# Patient Record
Sex: FEMALE | Race: WHITE | Employment: OTHER | URBAN - METROPOLITAN AREA
[De-identification: names, ages, dates, MRNs, and addresses within clinical notes are randomized per-mention and may not be internally consistent; named-entity substitution may affect disease eponyms.]

---

## 2017-12-26 ENCOUNTER — OFFICE VISIT (OUTPATIENT)
Dept: INTERNAL MEDICINE CLINIC | Age: 82
End: 2017-12-26

## 2017-12-26 VITALS
DIASTOLIC BLOOD PRESSURE: 73 MMHG | OXYGEN SATURATION: 97 % | SYSTOLIC BLOOD PRESSURE: 149 MMHG | BODY MASS INDEX: 24.63 KG/M2 | RESPIRATION RATE: 16 BRPM | HEIGHT: 63 IN | TEMPERATURE: 97.4 F | WEIGHT: 139 LBS | HEART RATE: 64 BPM

## 2017-12-26 DIAGNOSIS — M25.561 RIGHT KNEE PAIN, UNSPECIFIED CHRONICITY: ICD-10-CM

## 2017-12-26 DIAGNOSIS — I10 ESSENTIAL HYPERTENSION: ICD-10-CM

## 2017-12-26 DIAGNOSIS — F33.9 RECURRENT DEPRESSION (HCC): ICD-10-CM

## 2017-12-26 DIAGNOSIS — E55.9 VITAMIN D DEFICIENCY: ICD-10-CM

## 2017-12-26 DIAGNOSIS — E78.5 HYPERLIPIDEMIA, UNSPECIFIED HYPERLIPIDEMIA TYPE: ICD-10-CM

## 2017-12-26 DIAGNOSIS — E53.8 B12 DEFICIENCY: ICD-10-CM

## 2017-12-26 DIAGNOSIS — Z76.89 ENCOUNTER TO ESTABLISH CARE WITH NEW DOCTOR: Primary | ICD-10-CM

## 2017-12-26 DIAGNOSIS — G31.84 MCI (MILD COGNITIVE IMPAIRMENT) WITH MEMORY LOSS: ICD-10-CM

## 2017-12-26 RX ORDER — DICLOFENAC SODIUM 75 MG/1
TABLET, DELAYED RELEASE ORAL
COMMUNITY
End: 2017-12-26 | Stop reason: SDUPTHER

## 2017-12-26 RX ORDER — DICLOFENAC SODIUM 75 MG/1
75 TABLET, DELAYED RELEASE ORAL 2 TIMES DAILY
Qty: 180 TAB | Refills: 1 | Status: SHIPPED | OUTPATIENT
Start: 2017-12-26 | End: 2019-01-04 | Stop reason: SDUPTHER

## 2017-12-26 RX ORDER — BISMUTH SUBSALICYLATE 262 MG
1 TABLET,CHEWABLE ORAL DAILY
COMMUNITY

## 2017-12-26 RX ORDER — SIMVASTATIN 40 MG/1
TABLET, FILM COATED ORAL
COMMUNITY
End: 2017-12-26 | Stop reason: SDUPTHER

## 2017-12-26 RX ORDER — DULOXETIN HYDROCHLORIDE 60 MG/1
60 CAPSULE, DELAYED RELEASE ORAL DAILY
COMMUNITY
End: 2017-12-26 | Stop reason: SDUPTHER

## 2017-12-26 RX ORDER — DULOXETIN HYDROCHLORIDE 60 MG/1
60 CAPSULE, DELAYED RELEASE ORAL DAILY
Qty: 90 CAP | Refills: 1 | Status: SHIPPED | OUTPATIENT
Start: 2017-12-26 | End: 2019-01-04 | Stop reason: SDUPTHER

## 2017-12-26 RX ORDER — SIMVASTATIN 40 MG/1
40 TABLET, FILM COATED ORAL
Qty: 90 TAB | Refills: 1 | Status: SHIPPED | OUTPATIENT
Start: 2017-12-26 | End: 2019-01-04 | Stop reason: SDUPTHER

## 2017-12-26 RX ORDER — GLUCOSAMINE SULFATE 1500 MG
POWDER IN PACKET (EA) ORAL DAILY
COMMUNITY
End: 2018-01-31 | Stop reason: ALTCHOICE

## 2017-12-26 NOTE — PROGRESS NOTES
1. Have you been to the ER, urgent care clinic since your last visit? Hospitalized since your last visit? No    2. Have you seen or consulted any other health care providers outside of the [address] [place] [address] since your last visit? Include any pap smears or colon screening. [facility] Dr. Keith Hopkins.  for Neurology. Pt just moved here from [address] [place] [address] in July.

## 2017-12-26 NOTE — PROGRESS NOTES
Muriel Jaramillo is a [de-identified] y.o. female who presents today for Establish Care; Memory Loss; and Knee Pain (right)      She has a history of   Patient Active Problem List   Diagnosis Code    [facility] F33.9    Essential hypertension I10    Hyperlipidemia E78.5   . Today patient is here to establish care. Previous PCP Dr. Jaxon Lutz. she is switching because establishing care. Records are not available for me to review. Moved here from [place] to be closer to family. she does not have other concerns. Knee Pain: R knee. Has been going on for some time. Pain similar to L knee pain before replacement. No swelling. Feels a bit weak. Memory Loss: notes that this has been going on for some time. Over the last year has been getting a bit worse. Did neuro psych testing about 2 yrs ago and noted to have MCI. Mainly with short term memory. Did have some getting lost in a known location. Pt drinking EtOH regularly, 1-2/night. Pt does not stay very mentally active. A lot of passive activities. HTN: A bit up today. Not on any meds currently. Previously on Ramipril. HLD: On statin. Anxiety/Depression: On SNRI. Notes since passing of . No SI/HI. Social: retired. Previously worked part time. Has 3 children.  for 2 yrs. From [place] originally. ROS  Review of Systems   Constitutional: Negative for chills, fever, malaise/fatigue and weight loss. HENT: Negative for hearing loss and tinnitus. Respiratory: Negative for cough and shortness of breath. Cardiovascular: Negative for chest pain, palpitations and leg swelling. Gastrointestinal: Negative for abdominal pain, blood in stool, constipation, diarrhea, heartburn, nausea and vomiting. Genitourinary: Negative for dysuria, frequency, hematuria and urgency. Musculoskeletal: Positive for joint pain. Negative for back pain, myalgias and neck pain. Skin: Negative for itching and rash. Neurological: Negative. Endo/Heme/Allergies: Does not bruise/bleed easily. Psychiatric/Behavioral: Positive for memory loss. Negative for depression, hallucinations, substance abuse and suicidal ideas. The patient is not nervous/anxious and does not have insomnia. Visit Vitals    /73 (BP 1 Location: Left arm, BP Patient Position: Sitting)    Pulse 64    Temp 97.4 °F (36.3 °C) (Oral)    Resp 16    Ht 5' 3\" (1.6 m)    Wt 139 lb (63 kg)    SpO2 97%    BMI 24.62 kg/m2       Physical Exam   Constitutional: She is oriented to person, place, and time and well-developed, well-nourished, and in no distress. No distress. HENT:   Head: Normocephalic and atraumatic. Mouth/Throat: No oropharyngeal exudate. Eyes: Conjunctivae are normal. Pupils are equal, round, and reactive to light. No scleral icterus. Neck: Normal range of motion. No JVD present. No thyromegaly present. Cardiovascular: Normal rate and regular rhythm. Exam reveals no gallop and no friction rub. No murmur heard. Pulmonary/Chest: Effort normal and breath sounds normal. No respiratory distress. She has no wheezes. Abdominal: Soft. Bowel sounds are normal. She exhibits no distension. There is no rebound and no guarding. Musculoskeletal: Normal range of motion. She exhibits no edema. Legs:  Pain where noted. No crepitus. FROM. Neurological: She is alert and oriented to person, place, and time. No cranial nerve deficit. Some issues with short term memory, but answers appropriately. Skin: Skin is dry. No rash noted. Psychiatric: Mood, memory, affect and judgment normal.       Current Outpatient Prescriptions   Medication Sig    cholecalciferol (VITAMIN D3) 1,000 unit cap Take  by mouth daily.  multivitamin (ONE A DAY) tablet Take 1 Tab by mouth daily.  simvastatin (ZOCOR) 40 mg tablet Take 1 Tab by mouth nightly.  DULoxetine (CYMBALTA) 60 mg capsule Take 1 Cap by mouth daily.     diclofenac EC (VOLTAREN) 75 mg EC tablet Take 1 Tab by mouth two (2) times a day. No current facility-administered medications for this visit. Past Medical History:   Diagnosis Date    Anxiety     Depression     Hypercholesterolemia     Hypertension       Past Surgical History:   Procedure Laterality Date    HX APPENDECTOMY  1945    HX HYSTERECTOMY  1978    HX KNEE REPLACEMENT Left 2015    HX TONSILLECTOMY  1944      Social History   Substance Use Topics    Smoking status: Never Smoker    Smokeless tobacco: Never Used    Alcohol use 4.2 - 8.4 oz/week     7 - 14 Glasses of wine per week      Family History   Problem Relation Age of Onset    Adopted: Yes        No Known Allergies     Assessment/Plan  Diagnoses and all orders for this visit:    1. Encounter to establish care with new doctor - Will get old records. 2. Right knee pain, unspecified chronicity - Progressive. Worse at night. Taking scheduled AM Voltaren for ? Reason. Suggest moving to only PRN. May need Ortho evaluation in the future. -     diclofenac EC (VOLTAREN) 75 mg EC tablet; Take 1 Tab by mouth two (2) times a day. 3. Recurrent depression ([address] - Stable at this time. Not likely a contributor to memory issues. -     DULoxetine (CYMBALTA) 60 mg capsule; Take 1 Cap by mouth daily. 4. Vitamin D deficiency - repeat. -     VITAMIN D, 25 HYDROXY    5. Hyperlipidemia, unspecified hyperlipidemia type   -     LIPID PANEL  -     simvastatin (ZOCOR) 40 mg tablet; Take 1 Tab by mouth nightly. 6. Essential hypertension - off all meds. Previously on ACEi. Check at home and bring in log.   -     METABOLIC PANEL, COMPREHENSIVE  -     CBC W/O DIFF    7. MCI (mild cognitive impairment) with memory loss - notes to be worsening. Had NS testing done two years ago. No focal neurological symptoms. Suggest avoiding EtOH and being more mentally active. May need repeat testing.   -     TSH 3RD GENERATION  -     VITAMIN B12    8.  B12 deficiency - in the past.   -     VITAMIN B12        Follow-up Disposition:  Return in about 6 weeks (around 2/6/2018) for BP check .     Remedios Benson MD  12/26/2017

## 2017-12-26 NOTE — MR AVS SNAPSHOT
Visit Information Date & Time Provider Department Dept. Phone Encounter #  
 12/26/2017  3:00 PM Marley Mcgovern MD [facility] [address] [id number] Upcoming Health Maintenance Date Due DTaP/Tdap/Td series (1 - Tdap) 12/8/1958 ZOSTER VACCINE AGE 60> 10/8/1997 GLAUCOMA SCREENING Q2Y 12/8/2002 Pneumococcal 65+ Low/Medium Risk (1 of 2 - PCV13) 12/8/2002 MEDICARE YEARLY EXAM 12/8/2002 Allergies as of 12/26/2017  Review Complete On: 79/37/7419 By: Patvaleria Chantelle Wears No Known Allergies Current Immunizations  Never Reviewed No immunizations on file. Not reviewed this visit You Were Diagnosed With   
  
 Codes Comments Encounter to establish care with new doctor    -  Primary ICD-10-CM: Z76.89 
ICD-9-CM: V65.8 Right knee pain, unspecified chronicity     ICD-10-CM: M25.561 ICD-9-CM: 719.46 Recurrent depression ([facility]     ICD-10-CM: F33.9 ICD-9-CM: 296.30 Vitamin D deficiency     ICD-10-CM: E55.9 ICD-9-CM: 268.9 Hyperlipidemia, unspecified hyperlipidemia type     ICD-10-CM: E78.5 ICD-9-CM: 272.4 Essential hypertension     ICD-10-CM: I10 
ICD-9-CM: 401.9 MCI (mild cognitive impairment) with memory loss     ICD-10-CM: G31.84 ICD-9-CM: 331.83, 780.93   
 B12 deficiency     ICD-10-CM: E53.8 ICD-9-CM: 266.2 Vitals BP Pulse Temp Resp Height(growth percentile) Weight(growth percentile) 149/73 (BP 1 Location: Left arm, BP Patient Position: Sitting) 64 97.4 °F (36.3 °C) (Oral) 16 5' 3\" (1.6 m) 139 lb (63 kg) SpO2 BMI OB Status Smoking Status 97% 24.62 kg/m2 Hysterectomy Never Smoker Vitals History BMI and BSA Data Body Mass Index Body Surface Area  
 24.62 kg/m 2 1.67 m 2 Preferred Pharmacy Pharmacy Name Phone [address] [facility] [phone] Your Updated Medication List  
  
   
 This list is accurate as of: 12/26/17  4:27 PM.  Always use your most recent med list.  
  
  
  
  
 diclofenac EC 75 mg EC tablet Commonly known as:  VOLTAREN Take 1 Tab by mouth two (2) times a day. DULoxetine 60 mg capsule Commonly known as:  CYMBALTA Take 1 Cap by mouth daily. multivitamin tablet Commonly known as:  ONE A DAY Take 1 Tab by mouth daily. simvastatin 40 mg tablet Commonly known as:  ZOCOR Take 1 Tab by mouth nightly. VITAMIN D3 1,000 unit Cap Generic drug:  cholecalciferol Take  by mouth daily. Prescriptions Sent to Pharmacy Refills  
 simvastatin (ZOCOR) 40 mg tablet 1 Sig: Take 1 Tab by mouth nightly. Class: Normal  
 Pharmacy: [address], [address] Ph #: [phone] Route: Oral  
 DULoxetine (CYMBALTA) 60 mg capsule 1 Sig: Take 1 Cap by mouth daily. Class: Normal  
 Pharmacy: [address], [address] Ph #: [phone] Route: Oral  
 diclofenac EC (VOLTAREN) 75 mg EC tablet 1 Sig: Take 1 Tab by mouth two (2) times a day. Class: Normal  
 Pharmacy: [address], [address] Ph #: [phone] Route: Oral  
  
We Performed the Following CBC W/O DIFF [89773 CPT(R)] LIPID PANEL [38290 CPT(R)] METABOLIC PANEL, COMPREHENSIVE [72427 CPT(R)] TSH 3RD GENERATION [74971 CPT(R)] VITAMIN B12 [id number] CPT(R)] VITAMIN D, 25 HYDROXY [id number] CPT(R)] Patient Instructions Take diclofenac twice daily as needed , Check BP two times daily. Introducing [facility]! Tatum Sauceda introduces [employer] patient portal. Now you can access parts of your medical record, email your doctor's office, and request medication refills online. 1. In your internet browser, go to [email or web address] 2. Click on the First Time User? Click Here link in the Sign In box. You will see the New Member Sign Up page. 3. Enter your [employer] Access Code exactly as it appears below. You will not need to use this code after youve completed the sign-up process. If you do not sign up before the expiration date, you must request a new code. · [employer] Access Code: [email or web address] Expires: 3/26/2018  4:27 PM 
 
4. Enter the last four digits of your Social Security Number (xxxx) and Date of Birth (mm/dd/yyyy) as indicated and click Submit. You will be taken to the next sign-up page. 5. Create a [employer]idia ID. This will be your [employer] login ID and cannot be changed, so think of one that is secure and easy to remember. 6. Create a [employer] password. You can change your password at any time. 7. Enter your Password Reset Question and Answer. This can be used at a later time if you forget your password. 8. Enter your e-mail address. You will receive e-mail notification when new information is available in [address] 9. Click Sign Up. You can now view and download portions of your medical record. 10. Click the Download Summary menu link to download a portable copy of your medical information. If you have questions, please visit the Frequently Asked Questions section of the [employer] website. Remember, [employer] is NOT to be used for urgent needs. For medical emergencies, dial 911. Now available from your iPhone and Android! Please provide this summary of care documentation to your next provider. Your primary care clinician is listed as General Edward. If you have any questions after today's visit, please call [phone].

## 2018-01-16 ENCOUNTER — DOCUMENTATION ONLY (OUTPATIENT)
Dept: INTERNAL MEDICINE CLINIC | Age: 83
End: 2018-01-16

## 2018-01-16 NOTE — PROGRESS NOTES
Received medical records from Dr Beronica Arriola, [facility]. Records have been placed on Dr. Orozco Reason desk for review.

## 2018-01-29 ENCOUNTER — HOSPITAL ENCOUNTER (OUTPATIENT)
Dept: LAB | Age: 83
Discharge: HOME | End: 2018-01-29
Payer: MEDICARE

## 2018-01-29 PROCEDURE — 36415 COLL VENOUS BLD VENIPUNCTURE: CPT

## 2018-01-29 PROCEDURE — 80053 COMPREHEN METABOLIC PANEL: CPT

## 2018-01-29 PROCEDURE — 85027 COMPLETE CBC AUTOMATED: CPT

## 2018-01-29 PROCEDURE — 80061 LIPID PANEL: CPT

## 2018-01-29 PROCEDURE — 82306 VITAMIN D 25 HYDROXY: CPT

## 2018-01-29 PROCEDURE — 84443 ASSAY THYROID STIM HORMONE: CPT

## 2018-01-29 PROCEDURE — 82607 VITAMIN B-12: CPT

## 2018-01-30 LAB
25(OH)D3+25(OH)D2 SERPL-MCNC: 75 NG/ML (ref 30–100)
ALBUMIN SERPL-MCNC: 4.7 G/DL (ref 3.5–4.7)
ALBUMIN/GLOB SERPL: 1.7 {RATIO} (ref 1.2–2.2)
ALP SERPL-CCNC: 78 IU/L (ref 39–117)
ALT SERPL-CCNC: 14 IU/L (ref 0–32)
AST SERPL-CCNC: 14 IU/L (ref 0–40)
BILIRUB SERPL-MCNC: 0.4 MG/DL (ref 0–1.2)
BUN SERPL-MCNC: 18 MG/DL (ref 8–27)
BUN/CREAT SERPL: 28 (ref 12–28)
CALCIUM SERPL-MCNC: 9.6 MG/DL (ref 8.7–10.3)
CHLORIDE SERPL-SCNC: 101 MMOL/L (ref 96–106)
CHOLEST SERPL-MCNC: 164 MG/DL (ref 100–199)
CO2 SERPL-SCNC: 25 MMOL/L (ref 18–29)
CREAT SERPL-MCNC: 0.65 MG/DL (ref 0.57–1)
ERYTHROCYTE [DISTWIDTH] IN BLOOD BY AUTOMATED COUNT: 13.4 % (ref 12.3–15.4)
GFR SERPLBLD CREATININE-BSD FMLA CKD-EPI: 84 ML/MIN/1.73
GFR SERPLBLD CREATININE-BSD FMLA CKD-EPI: 97 ML/MIN/1.73
GLOBULIN SER CALC-MCNC: 2.7 G/DL (ref 1.5–4.5)
GLUCOSE SERPL-MCNC: 104 MG/DL (ref 65–99)
HCT VFR BLD AUTO: 38.8 % (ref 34–46.6)
HDLC SERPL-MCNC: 63 MG/DL
HGB BLD-MCNC: 12.5 G/DL (ref 11.1–15.9)
INTERPRETATION, 910389: NORMAL
LDLC SERPL CALC-MCNC: 85 MG/DL (ref 0–99)
MCH RBC QN AUTO: 29.8 PG (ref 26.6–33)
MCHC RBC AUTO-ENTMCNC: 32.2 G/DL (ref 31.5–35.7)
MCV RBC AUTO: 92 FL (ref 79–97)
PLATELET # BLD AUTO: 293 X10E3/UL (ref 150–379)
POTASSIUM SERPL-SCNC: 4 MMOL/L (ref 3.5–5.2)
PROT SERPL-MCNC: 7.4 G/DL (ref 6–8.5)
RBC # BLD AUTO: 4.2 X10E6/UL (ref 3.77–5.28)
SODIUM SERPL-SCNC: 145 MMOL/L (ref 134–144)
TRIGL SERPL-MCNC: 80 MG/DL (ref 0–149)
TSH SERPL DL<=0.005 MIU/L-ACNC: 2.76 UIU/ML (ref 0.45–4.5)
VIT B12 SERPL-MCNC: 360 PG/ML (ref 232–1245)
VLDLC SERPL CALC-MCNC: 16 MG/DL (ref 5–40)
WBC # BLD AUTO: 4.6 X10E3/UL (ref 3.4–10.8)

## 2018-01-31 ENCOUNTER — OFFICE VISIT (OUTPATIENT)
Dept: INTERNAL MEDICINE CLINIC | Age: 83
End: 2018-01-31

## 2018-01-31 VITALS
DIASTOLIC BLOOD PRESSURE: 71 MMHG | BODY MASS INDEX: 24.1 KG/M2 | WEIGHT: 136 LBS | OXYGEN SATURATION: 98 % | HEART RATE: 66 BPM | SYSTOLIC BLOOD PRESSURE: 194 MMHG | RESPIRATION RATE: 16 BRPM | HEIGHT: 63 IN

## 2018-01-31 DIAGNOSIS — F33.9 RECURRENT DEPRESSION (HCC): ICD-10-CM

## 2018-01-31 DIAGNOSIS — Z12.39 SCREENING FOR MALIGNANT NEOPLASM OF BREAST: ICD-10-CM

## 2018-01-31 DIAGNOSIS — E78.5 HYPERLIPIDEMIA, UNSPECIFIED HYPERLIPIDEMIA TYPE: ICD-10-CM

## 2018-01-31 DIAGNOSIS — G31.84 MCI (MILD COGNITIVE IMPAIRMENT): ICD-10-CM

## 2018-01-31 DIAGNOSIS — Z00.00 INITIAL MEDICARE ANNUAL WELLNESS VISIT: Primary | ICD-10-CM

## 2018-01-31 DIAGNOSIS — I10 ESSENTIAL HYPERTENSION: ICD-10-CM

## 2018-01-31 DIAGNOSIS — Z78.0 POSTMENOPAUSAL: ICD-10-CM

## 2018-01-31 RX ORDER — LISINOPRIL 20 MG/1
20 TABLET ORAL DAILY
Qty: 30 TAB | Refills: 1 | Status: SHIPPED | OUTPATIENT
Start: 2018-01-31 | End: 2018-01-31 | Stop reason: SDUPTHER

## 2018-01-31 RX ORDER — LISINOPRIL 20 MG/1
20 TABLET ORAL DAILY
Qty: 30 TAB | Refills: 1 | Status: SHIPPED | OUTPATIENT
Start: 2018-01-31 | End: 2018-02-21 | Stop reason: SDUPTHER

## 2018-01-31 NOTE — PROGRESS NOTES
Marcos Huff is a [de-identified] y.o. female who presents today for Hypertension; Cholesterol Problem; and Depression  . She has a history of   Patient Active Problem List   Diagnosis Code    Recurrent depression ([facility] F33.9    Essential hypertension I10    Hyperlipidemia E78.5   . Today patient is here for f/u.   she does not have other concerns. Hypertension - off all meds. Today very high. Previously on ACEi    reports that she has never smoked. She has never used smokeless tobacco.    reports that she drinks about 4.2 - 8.4 oz of alcohol per week    BP Readings from Last 2 Encounters:   01/31/18 194/71   12/26/17 149/73     Hyperlipidemia  Currently she takes 40 mg of simvastatin. ROS: taking medications as instructed, no medication side effects noted  No new myalgias, no joint pains, no weakness  No TIA's, no chest pain on exertion, no dyspnea on exertion, no swelling of ankles. Lab Results   Component Value Date/Time    Cholesterol, total 164 01/29/2018 10:35 AM    HDL Cholesterol 63 01/29/2018 10:35 AM    LDL, calculated 85 01/29/2018 10:35 AM    VLDL, calculated 16 01/29/2018 10:35 AM    Triglyceride 80 01/29/2018 10:35 AM     Knee Pain: Taking BID Diclofenac. Stable. Memory Loss: notes that this has been going on for some time. Over the last year has been getting a bit worse. Did neuro psych testing about 2 yrs ago and noted to have MCI. Mainly with short term memory. Did have some getting lost in a known location. Pt drinking EtOH regularly, 1-2/night. Pt does not stay very mentally active. A lot of passive activities, but has been socializing more. Mood and depression is better. Has been on namenda in the past.     B12 Deficiency: in the past. Now     MCI: was on namenda in the past. ? For s/e. Health maintenance hx includes:  Exercise: not active. Diet: generally follows a low fat low cholesterol diet, nonsmoker  Social: . Staying mentally active. At [facility]. Screening:    Colon cancer screening:  Last Colonoscopy:Notes within last 10 yrs. Breast cancer screening: last mammogram Mammogram ordered. Cervical cancer screening: last PAP/Pelvic exam:N/A. Osteoporosis screening:  Last BMD:  2008 and revealed    - Osteopenia      Immunizations:     Immunization History   Administered Date(s) Administered    Pneumococcal Conjugate (PCV-13) 08/14/2015    Pneumococcal Polysaccharide (PPSV-23) 11/17/2000, 02/25/2013    Tdap 10/27/1997, 06/01/2007    Zoster Vaccine, Live 06/01/2007      Immunization status: up to date and documented. ROS  Review of Systems   Constitutional: Negative for chills, fever and weight loss. Respiratory: Negative for cough and shortness of breath. Cardiovascular: Negative for chest pain, palpitations and leg swelling. Gastrointestinal: Negative for abdominal pain, nausea and vomiting. Musculoskeletal: Positive for joint pain. Neurological: Negative. Endo/Heme/Allergies: Does not bruise/bleed easily. Psychiatric/Behavioral: Negative for depression. The patient is not nervous/anxious. Visit Vitals    /71 (BP 1 Location: Left arm, BP Patient Position: Sitting)    Pulse 66    Resp 16    Ht 5' 3\" (1.6 m)    Wt 136 lb (61.7 kg)    SpO2 98%    BMI 24.09 kg/m2       Physical Exam   Constitutional: She is oriented to person, place, and time. She appears well-developed and well-nourished. HENT:   Head: Normocephalic and atraumatic. Cardiovascular: Normal rate and regular rhythm. No murmur heard. Pulmonary/Chest: Effort normal. No respiratory distress. Neurological: She is alert and oriented to person, place, and time. Skin: Skin is warm and dry. Psychiatric: She has a normal mood and affect. Her behavior is normal.         Current Outpatient Prescriptions   Medication Sig    lisinopril (PRINIVIL, ZESTRIL) 20 mg tablet Take 1 Tab by mouth daily.     cyanocobalamin 1,000 mcg tablet Take 1,000 mcg by mouth daily.  multivitamin (ONE A DAY) tablet Take 1 Tab by mouth daily.  simvastatin (ZOCOR) 40 mg tablet Take 1 Tab by mouth nightly.  DULoxetine (CYMBALTA) 60 mg capsule Take 1 Cap by mouth daily.  diclofenac EC (VOLTAREN) 75 mg EC tablet Take 1 Tab by mouth two (2) times a day. No current facility-administered medications for this visit. Past Medical History:   Diagnosis Date    Anxiety     Depression     Hypercholesterolemia     Hypertension       Past Surgical History:   Procedure Laterality Date    HX APPENDECTOMY  1945    HX HYSTERECTOMY  1978    HX KNEE REPLACEMENT Left 2015    HX TONSILLECTOMY  1944      Social History   Substance Use Topics    Smoking status: Never Smoker    Smokeless tobacco: Never Used    Alcohol use 4.2 - 8.4 oz/week     7 - 14 Glasses of wine per week      Family History   Problem Relation Age of Onset    Adopted: Yes        No Known Allergies     Assessment/Plan  Diagnoses and all orders for this visit:    1. Initial Medicare annual wellness visit - MMG and Dexa. Notes C-scope UTD. Will f/u. Living will and end of life planning has been done  Urged to increase physical activity. Margert Klinefelter was counseled on age-appropriate/ guideline-based risk prevention behaviors and screening for a [de-identified]y.o. year old   female . We also discussed adjustments in screening based on family history if necessary. Printed instructions for preventative screening guidelines and healthy behaviors given to patient with after visit summary. 2. Postmenopausal  -     DEXA BONE DENSITY STUDY AXIAL; Future    3. Recurrent depression (HCC) - stable     4. Essential hypertension - High today. On ACEi in the past. Resume. See me in 2-4 weeks for BP check. -     lisinopril (PRINIVIL, ZESTRIL) 20 mg tablet; Take 1 Tab by mouth daily. 5. Hyperlipidemia, unspecified hyperlipidemia type    6.  Screening for malignant neoplasm of breast  -     NIKITA MAMMO BI SCREENING INCL CAD; Future    7. MCI (mild cognitive impairment) - was [facility] in the past. Memory stable. Consider adding something in the future. Follow-up Disposition:  Return in about 2 weeks (around 2/14/2018) for BP check. Gabriel Emanuel MD  1/31/2018            This is an Initial Medicare Annual Wellness Exam (AWV) (Performed 12 months after IPPE or effective date of Medicare Part B enrollment, Once in a lifetime)    I have reviewed the patient's medical history in detail and updated the computerized patient record. History     Past Medical History:   Diagnosis Date    Anxiety     Depression     Hypercholesterolemia     Hypertension       Past Surgical History:   Procedure Laterality Date    HX APPENDECTOMY  1945    HX HYSTERECTOMY  1978    HX KNEE REPLACEMENT Left 2015    HX TONSILLECTOMY  1944     Current Outpatient Prescriptions   Medication Sig Dispense Refill    lisinopril (PRINIVIL, ZESTRIL) 20 mg tablet Take 1 Tab by mouth daily. 30 Tab 1    cyanocobalamin 1,000 mcg tablet Take 1,000 mcg by mouth daily.  multivitamin (ONE A DAY) tablet Take 1 Tab by mouth daily.  simvastatin (ZOCOR) 40 mg tablet Take 1 Tab by mouth nightly. 90 Tab 1    DULoxetine (CYMBALTA) 60 mg capsule Take 1 Cap by mouth daily. 90 Cap 1    diclofenac EC (VOLTAREN) 75 mg EC tablet Take 1 Tab by mouth two (2) times a day.  180 Tab 1     No Known Allergies  Family History   Problem Relation Age of Onset    Adopted: Yes     Social History   Substance Use Topics    Smoking status: Never Smoker    Smokeless tobacco: Never Used    Alcohol use 4.2 - 8.4 oz/week     7 - 14 Glasses of wine per week     Patient Active Problem List   Diagnosis Code    Recurrent depression ([facility] F33.9    Essential hypertension I10    Hyperlipidemia E78.5       Depression Risk Factor Screening:     PHQ over the last two weeks 12/26/2017   PHQ Not Done Active Diagnosis of Depression or Bipolar Disorder     Alcohol Risk Factor Screening: You do not drink alcohol or very rarely. Functional Ability and Level of Safety:     Hearing Loss  Hearing is good. Activities of Daily Living  The home contains: no safety equipment. Patient does total self care    Fall Risk  Fall Risk Assessment, last 12 mths 12/26/2017   Able to walk? Yes   Fall in past 12 months? No       Abuse Screen  Patient is not abused    Cognitive Screening   Evaluation of Cognitive Function:  Has your family/caregiver stated any concerns about your memory: no  Abnormal    Patient Care Team   Patient Care Team:  Debra Weiner MD as PCP - General (Internal Medicine)    Assessment/Plan   Education and counseling provided:  Are appropriate based on today's review and evaluation  End-of-Life planning (with patient's consent)  Pneumococcal Vaccine  Influenza Vaccine  Screening Mammography    Diagnoses and all orders for this visit:    1.  Initial Medicare annual wellness visit       Health Maintenance Due   Topic Date Due    GLAUCOMA SCREENING Q2Y  12/08/2002    MEDICARE YEARLY EXAM  12/08/2002    DTaP/Tdap/Td series (3 - Td) 06/01/2017

## 2018-01-31 NOTE — LETTER
1/31/2018 12:12 PM 
 
Ms. Eda Shahid [address] [facility] [address] [id number] Dear Eda Shahid: Please find your most recent results below. Resulted Orders METABOLIC PANEL, COMPREHENSIVE Result Value Ref Range Glucose 104 (H) 65 - 99 mg/dL BUN 18 8 - 27 mg/dL Creatinine 0.65 0.57 - 1.00 mg/dL GFR est non-AA 84 >59 mL/min/1.73 GFR est AA 97 >59 mL/min/1.73  
 BUN/Creatinine ratio 28 12 - 28 Sodium 145 (H) 134 - 144 mmol/L Potassium 4.0 3.5 - 5.2 mmol/L Chloride 101 96 - 106 mmol/L  
 CO2 25 18 - 29 mmol/L Calcium 9.6 8.7 - 10.3 mg/dL Protein, total 7.4 6.0 - 8.5 g/dL Albumin 4.7 3.5 - 4.7 g/dL GLOBULIN, TOTAL 2.7 1.5 - 4.5 g/dL A-G Ratio 1.7 1.2 - 2.2 Bilirubin, total 0.4 0.0 - 1.2 mg/dL Alk. phosphatase 78 39 - 117 IU/L  
 AST (SGOT) 14 0 - 40 IU/L  
 ALT (SGPT) 14 0 - 32 IU/L Narrative Performed at:  [address]  [id number] : Vero Madsen MD, Phone:  [phone] CBC W/O DIFF Result Value Ref Range WBC 4.6 3.4 - 10.8 x10E3/uL  
 RBC 4.20 3.77 - 5.28 x10E6/uL HGB 12.5 11.1 - 15.9 g/dL HCT 38.8 34.0 - 46.6 % MCV 92 79 - 97 fL  
 MCH 29.8 26.6 - 33.0 pg  
 MCHC 32.2 31.5 - 35.7 g/dL  
 RDW 13.4 12.3 - 15.4 % PLATELET 597 878 - 362 x10E3/uL Narrative Performed at:  [address]  [id number] : Vero Madsen MD, Phone:  [phone] TSH 3RD GENERATION Result Value Ref Range TSH 2.760 0.450 - 4.500 uIU/mL Narrative Performed at:  [address]  [id number] : Vero Madsen MD, Phone:  [phone] VITAMIN B12 Result Value Ref Range Vitamin B12 360 232 - 1245 pg/mL Narrative Performed at:  [address]  [id number] : Vero Madsen MD, Phone:  [phone] LIPID PANEL Result Value Ref Range Cholesterol, total 164 100 - 199 mg/dL Triglyceride 80 0 - 149 mg/dL HDL Cholesterol 63 >39 mg/dL VLDL, calculated 16 5 - 40 mg/dL LDL, calculated 85 0 - 99 mg/dL Narrative Performed at:  [address]  [id number] : Shiloh Osman MD, Phone:  [phone] VITAMIN D, 25 HYDROXY Result Value Ref Range VITAMIN D, 25-HYDROXY 75.0 30.0 - 100.0 ng/mL Comment:  
   Vitamin D deficiency has been defined by the [address] [place] [address] practice guideline as a 
level of serum 25-OH vitamin D less than 20 ng/mL (1,2). The Endocrine Society went on to further define vitamin D 
insufficiency as a level between 21 and 29 ng/mL (2). 1. IOM ([place] of Medicine). 2010. Dietary reference 
   intakes for calcium and D. [address] [place] [address]: The 
   [employer]. 2. Shari MF, Deejay NC, Eden RICO, et al. 
   Evaluation, treatment, and prevention of vitamin D 
   deficiency: an Endocrine Society clinical practice 
   guideline. JCEM. 2011 Jul; 96(7):1911-30. Narrative Performed at:  [address]  [id number] : Shiloh Osman MD, Phone:  [phone] CVD REPORT Result Value Ref Range INTERPRETATION Note Comment:  
   Supplemental report is available. Narrative Performed at:  [address] [place] [address]  [id number] : Tyler Jones PhD, Phone:  [phone] Please call me if you have any questions: [phone] Sincerely, Gabriel Emanuel MD

## 2018-01-31 NOTE — PATIENT INSTRUCTIONS

## 2018-01-31 NOTE — MR AVS SNAPSHOT
[id number] [address] 
[phone] Patient: Isaiah Bosch MRN: [id number] :1935 Visit Information Date & Time Provider Department Dept. Phone Encounter #  
 2018 11:45 AM Jana Acevedo MD [facility] [phone] [id number] Upcoming Health Maintenance Date Due  
 GLAUCOMA SCREENING Q2Y 2002 MEDICARE YEARLY EXAM 2002 DTaP/Tdap/Td series (3 - Td) 2017 Allergies as of 2018  Review Complete On: 2018 By: Jana Acevedo MD  
 No Known Allergies Current Immunizations  Reviewed on 2017 Name Date Pneumococcal Conjugate (PCV-13) 2015 Pneumococcal Polysaccharide (PPSV-23) 2013, 2000 Tdap 2007, 10/27/1997 Zoster Vaccine, Live 2007 Not reviewed this visit You Were Diagnosed With   
  
 Codes Comments Initial Medicare annual wellness visit    -  Primary ICD-10-CM: Z00.00 ICD-9-CM: V70.0 Postmenopausal     ICD-10-CM: Z78.0 ICD-9-CM: V49.81 Recurrent depression ([address]     ICD-10-CM: F33.9 ICD-9-CM: 296.30 Essential hypertension     ICD-10-CM: I10 
ICD-9-CM: 401.9 Hyperlipidemia, unspecified hyperlipidemia type     ICD-10-CM: E78.5 ICD-9-CM: 272.4 Screening for malignant neoplasm of breast     ICD-10-CM: Z12.31 
ICD-9-CM: V76.10 MCI (mild cognitive impairment)     ICD-10-CM: G31.84 ICD-9-CM: 331.83 Vitals BP Pulse Resp Height(growth percentile) Weight(growth percentile) SpO2  
 194/71 (BP 1 Location: Left arm, BP Patient Position: Sitting) 66 16 5' 3\" (1.6 m) 136 lb (61.7 kg) 98% BMI OB Status Smoking Status 24.09 kg/m2 Hysterectomy Never Smoker Vitals History BMI and BSA Data Body Mass Index Body Surface Area 24.09 kg/m 2 1.66 m 2 Preferred Pharmacy Pharmacy Name Phone [facility]/PHARMACY #[id number] [facility], [address] [place] [address]  [phone] Your Updated Medication List  
  
   
This list is accurate as of: 1/31/18 12:32 PM.  Always use your most recent med list.  
  
  
  
  
 cyanocobalamin 1,000 mcg tablet Take 1,000 mcg by mouth daily. diclofenac EC 75 mg EC tablet Commonly known as:  VOLTAREN Take 1 Tab by mouth two (2) times a day. DULoxetine 60 mg capsule Commonly known as:  CYMBALTA Take 1 Cap by mouth daily. lisinopril 20 mg tablet Commonly known as:  Anoop Dubonnet Take 1 Tab by mouth daily. multivitamin tablet Commonly known as:  ONE A DAY Take 1 Tab by mouth daily. simvastatin 40 mg tablet Commonly known as:  ZOCOR Take 1 Tab by mouth nightly. Prescriptions Sent to Pharmacy Refills  
 lisinopril (PRINIVIL, ZESTRIL) 20 mg tablet 1 Sig: Take 1 Tab by mouth daily. Class: Normal  
 Pharmacy: [facility]/pharmacy #[id number] [facility], [address] [place] [address]  Ph #: [phone] Route: Oral  
  
To-Do List   
 01/31/2018 Imaging:  DEXA BONE DENSITY STUDY AXIAL   
  
 01/31/2018 Imaging:  NIKITA MAMMO BI SCREENING INCL CAD Patient Instructions Medicare Wellness Visit, Female The best way to live healthy is to have a healthy lifestyle by eating a well-balanced diet, exercising regularly, limiting alcohol and stopping smoking. Regular physical exams and screening tests are another way to keep healthy. Preventive exams provided by your health care provider can find health problems before they become diseases or illnesses. Preventive services including immunizations, screening tests, monitoring and exams can help you take care of your own health. All people over age 72 should have a pneumovax  and and a prevnar shot to prevent pneumonia. These are once in a lifetime unless you and your provider decide differently. All people over 65 should have a yearly flu shot and a tetanus vaccine every 10 years. A bone mass density to screen for osteoporosis or thinning of the bones should be done every 2 years after 65. Screening for diabetes mellitus with a blood sugar test should be done every year. Glaucoma is a disease of the eye due to increased ocular pressure that can lead to blindness and it should be done every year by an eye professional. 
 
Cardiovascular screening tests that check for elevated lipids (fatty part of blood) which can lead to heart disease and strokes should be done every 5 years. Colorectal screening that evaluates for blood or polyps in your colon should be done yearly as a stool test or every five years as a flexible sigmoidoscope or every 10 years as a colonoscopy up to age 76. Breast cancer screening with a mammogram is recommended biennially  for women age 54-69. Screening for cervical cancer with a pap smear and pelvic exam is recommended for women after age 72 years every 2 years up to age 79 or when the provider and patient decide to stop. If there is a history of cervical abnormalities or other increased risk for cancer then the test is recommended yearly. Hepatitis C screening is also recommended for anyone born between [birth year] through [address]. A shingles vaccine is also recommended once in a lifetime after age 61. Your Medicare Wellness Exam is recommended annually. Here is a list of your current Health Maintenance items with a due date: 
Health Maintenance Due Topic Date Due  Glaucoma Screening   12/08/2002 [facility] Annual Well Visit  12/08/2002  DTaP/Tdap/Td  (3 - Td) 06/01/2017 Introducing [facility]! Brodie Paredes introduces [employer] patient portal. Now you can access parts of your medical record, email your doctor's office, and request medication refills online. 1. In your internet browser, go to [email or web address] 2. Click on the First Time User? Click Here link in the Sign In box. You will see the New Member Sign Up page. 3. Enter your [employer] Access Code exactly as it appears below. You will not need to use this code after youve completed the sign-up process. If you do not sign up before the expiration date, you must request a new code. · [employer] Access Code: [email or web address] Expires: 3/26/2018  4:27 PM 
 
4. Enter the last four digits of your Social Security Number (xxxx) and Date of Birth (mm/dd/yyyy) as indicated and click Submit. You will be taken to the next sign-up page. 5. Create a [employer] ID. This will be your [employer] login ID and cannot be changed, so think of one that is secure and easy to remember. 6. Create a [employer] password. You can change your password at any time. 7. Enter your Password Reset Question and Answer. This can be used at a later time if you forget your password. 8. Enter your e-mail address. You will receive e-mail notification when new information is available in [address] 9. Click Sign Up. You can now view and download portions of your medical record. 10. Click the Download Summary menu link to download a portable copy of your medical information. If you have questions, please visit the Frequently Asked Questions section of the [employer] website. Remember, [employer] is NOT to be used for urgent needs. For medical emergencies, dial 911. Now available from your iPhone and Android! Please provide this summary of care documentation to your next provider. Your primary care clinician is listed as Wyatt Garibay. If you have any questions after today's visit, please call [phone].

## 2018-02-21 ENCOUNTER — OFFICE VISIT (OUTPATIENT)
Dept: INTERNAL MEDICINE CLINIC | Age: 83
End: 2018-02-21

## 2018-02-21 VITALS
OXYGEN SATURATION: 98 % | DIASTOLIC BLOOD PRESSURE: 75 MMHG | WEIGHT: 138 LBS | HEART RATE: 78 BPM | SYSTOLIC BLOOD PRESSURE: 135 MMHG | HEIGHT: 63 IN | BODY MASS INDEX: 24.45 KG/M2 | TEMPERATURE: 97.5 F

## 2018-02-21 DIAGNOSIS — G31.84 MCI (MILD COGNITIVE IMPAIRMENT) WITH MEMORY LOSS: ICD-10-CM

## 2018-02-21 DIAGNOSIS — I10 ESSENTIAL HYPERTENSION: Primary | ICD-10-CM

## 2018-02-21 RX ORDER — DONEPEZIL HYDROCHLORIDE 5 MG/1
5 TABLET, FILM COATED ORAL
Qty: 3 TAB | Refills: 4 | Status: SHIPPED | OUTPATIENT
Start: 2018-02-21 | End: 2018-02-22 | Stop reason: SDUPTHER

## 2018-02-21 RX ORDER — LISINOPRIL 20 MG/1
20 TABLET ORAL DAILY
Qty: 90 TAB | Refills: 1 | Status: SHIPPED | OUTPATIENT
Start: 2018-02-21 | End: 2018-09-19 | Stop reason: SDUPTHER

## 2018-02-21 NOTE — PATIENT INSTRUCTIONS
High Blood Pressure: Care Instructions  Your Care Instructions    If your blood pressure is usually above 140/90, you have high blood pressure, or hypertension. That means the top number is 140 or higher or the bottom number is 90 or higher, or both. Despite what a lot of people think, high blood pressure usually doesn't cause headaches or make you feel dizzy or lightheaded. It usually has no symptoms. But it does increase your risk for heart attack, stroke, and kidney or eye damage. The higher your blood pressure, the more your risk increases. Your doctor will give you a goal for your blood pressure. Your goal will be based on your health and your age. An example of a goal is to keep your blood pressure below 140/90. Lifestyle changes, such as eating healthy and being active, are always important to help lower blood pressure. You might also take medicine to reach your blood pressure goal.  Follow-up care is a key part of your treatment and safety. Be sure to make and go to all appointments, and call your doctor if you are having problems. It's also a good idea to know your test results and keep a list of the medicines you take. How can you care for yourself at home? Medical treatment  · If you stop taking your medicine, your blood pressure will go back up. You may take one or more types of medicine to lower your blood pressure. Be safe with medicines. Take your medicine exactly as prescribed. Call your doctor if you think you are having a problem with your medicine. · Talk to your doctor before you start taking aspirin every day. Aspirin can help certain people lower their risk of a heart attack or stroke. But taking aspirin isn't right for everyone, because it can cause serious bleeding. · See your doctor regularly. You may need to see the doctor more often at first or until your blood pressure comes down.   · If you are taking blood pressure medicine, talk to your doctor before you take decongestants or anti-inflammatory medicine, such as ibuprofen. Some of these medicines can raise blood pressure. · Learn how to check your blood pressure at home. Lifestyle changes  · Stay at a healthy weight. This is especially important if you put on weight around the waist. Losing even 10 pounds can help you lower your blood pressure. · If your doctor recommends it, get more exercise. Walking is a good choice. Bit by bit, increase the amount you walk every day. Try for at least 30 minutes on most days of the week. You also may want to swim, bike, or do other activities. · Avoid or limit alcohol. Talk to your doctor about whether you can drink any alcohol. · Try to limit how much sodium you eat to less than 2,300 milligrams (mg) a day. Your doctor may ask you to try to eat less than 1,500 mg a day. · Eat plenty of fruits (such as bananas and oranges), vegetables, legumes, whole grains, and low-fat dairy products. · Lower the amount of saturated fat in your diet. Saturated fat is found in animal products such as milk, cheese, and meat. Limiting these foods may help you lose weight and also lower your risk for heart disease. · Do not smoke. Smoking increases your risk for heart attack and stroke. If you need help quitting, talk to your doctor about stop-smoking programs and medicines. These can increase your chances of quitting for good. When should you call for help? Call 911 anytime you think you may need emergency care. This may mean having symptoms that suggest that your blood pressure is causing a serious heart or blood vessel problem. Your blood pressure may be over 180/110. ? For example, call 911 if:  ? · You have symptoms of a heart attack. These may include:  ¨ Chest pain or pressure, or a strange feeling in the chest.  ¨ Sweating. ¨ Shortness of breath. ¨ Nausea or vomiting.   ¨ Pain, pressure, or a strange feeling in the back, neck, jaw, or upper belly or in one or both shoulders or arms.  ¨ Lightheadedness or sudden weakness. ¨ A fast or irregular heartbeat. ? · You have symptoms of a stroke. These may include:  ¨ Sudden numbness, tingling, weakness, or loss of movement in your face, arm, or leg, especially on only one side of your body. ¨ Sudden vision changes. ¨ Sudden trouble speaking. ¨ Sudden confusion or trouble understanding simple statements. ¨ Sudden problems with walking or balance. ¨ A sudden, severe headache that is different from past headaches. ? · You have severe back or belly pain. ?Do not wait until your blood pressure comes down on its own. Get help right away. ?Call your doctor now or seek immediate care if:  ? · Your blood pressure is much higher than normal (such as 180/110 or higher), but you don't have symptoms. ? · You think high blood pressure is causing symptoms, such as:  ¨ Severe headache. ¨ Blurry vision. ? Watch closely for changes in your health, and be sure to contact your doctor if:  ? · Your blood pressure measures 140/90 or higher at least 2 times. That means the top number is 140 or higher or the bottom number is 90 or higher, or both. ? · You think you may be having side effects from your blood pressure medicine. ? · Your blood pressure is usually normal, but it goes above normal at least 2 times. Where can you learn more? Go to http://dar-albania.info/. Enter T713 in the search box to learn more about \"High Blood Pressure: Care Instructions. \"  Current as of: September 21, 2016  Content Version: 11.4  © 4497-5256 [email or web address]. Care instructions adapted under license by [employer] (which disclaims liability or warranty for this information). If you have questions about a medical condition or this instruction, always ask your healthcare professional. [place] [address] any warranty or liability for your use of this information.

## 2018-02-21 NOTE — PROGRESS NOTES
Paulino Robles is a [de-identified] y.o. female who presents today for Blood Pressure Check (3 week f/u )  . She has a history of   Patient Active Problem List   Diagnosis Code    Recurrent depression ([facility] F33.9    Essential hypertension I10    Hyperlipidemia E78.5   . Today patient is here for f/u on BP.   she does not have other concerns. Hypertension - resumed due to very high BP. Since notes doing well. No dizziness. Hypertension ROS: taking medications as instructed, no medication side effects noted, no TIA's, no chest pain on exertion, no dyspnea on exertion, no swelling of ankles     reports that she has never smoked. She has never used smokeless tobacco.    reports that she drinks about 4.2 - 8.4 oz of alcohol per week    BP Readings from Last 2 Encounters:   02/21/18 135/75   01/31/18 194/71     MCI: per neuropsychology. ROS  Review of Systems   Constitutional: Negative for chills, fever and weight loss. Respiratory: Negative for cough and shortness of breath. Cardiovascular: Negative for chest pain, palpitations and leg swelling. Gastrointestinal: Negative for abdominal pain, nausea and vomiting. Neurological: Negative. Endo/Heme/Allergies: Does not bruise/bleed easily. Psychiatric/Behavioral: Positive for memory loss. Negative for depression. The patient is not nervous/anxious. Visit Vitals    /75    Pulse 78    Temp 97.5 °F (36.4 °C) (Oral)    Ht 5' 3\" (1.6 m)    Wt 138 lb (62.6 kg)    SpO2 98%    BMI 24.45 kg/m2       Physical Exam   Constitutional: She is oriented to person, place, and time. She appears well-developed and well-nourished. No distress. HENT:   Head: Normocephalic and atraumatic. Neck: Normal range of motion. Neck supple. No thyromegaly present. Cardiovascular: Normal rate and regular rhythm. No murmur heard. Pulmonary/Chest: Effort normal and breath sounds normal. No respiratory distress. She has no wheezes. Abdominal: Soft.  Bowel sounds are normal. She exhibits no distension. Musculoskeletal: She exhibits no edema. Neurological: She is alert and oriented to person, place, and time. No cranial nerve deficit. Skin: Skin is warm and dry. Psychiatric: She has a normal mood and affect. Her behavior is normal.         Current Outpatient Prescriptions   Medication Sig    Biotin 2,500 mcg cap Take  by mouth.  lisinopril (PRINIVIL, ZESTRIL) 20 mg tablet Take 1 Tab by mouth daily.  donepezil (ARICEPT) 5 mg tablet Take 1 Tab by mouth nightly.  cyanocobalamin 1,000 mcg tablet Take 1,000 mcg by mouth daily.  multivitamin (ONE A DAY) tablet Take 1 Tab by mouth daily.  simvastatin (ZOCOR) 40 mg tablet Take 1 Tab by mouth nightly.  DULoxetine (CYMBALTA) 60 mg capsule Take 1 Cap by mouth daily.  diclofenac EC (VOLTAREN) 75 mg EC tablet Take 1 Tab by mouth two (2) times a day. No current facility-administered medications for this visit. Past Medical History:   Diagnosis Date    Anxiety     Depression     Hypercholesterolemia     Hypertension       Past Surgical History:   Procedure Laterality Date    HX APPENDECTOMY  1945    HX HYSTERECTOMY  1978    HX KNEE REPLACEMENT Left 2015    HX TONSILLECTOMY  1944      Social History   Substance Use Topics    Smoking status: Never Smoker    Smokeless tobacco: Never Used    Alcohol use 4.2 - 8.4 oz/week     7 - 14 Glasses of wine per week      Family History   Problem Relation Age of Onset    Adopted: Yes        No Known Allergies     Assessment/Plan  Diagnoses and all orders for this visit:    1. Essential hypertension - much improved. No S/e. Continue at this dose. -     lisinopril (PRINIVIL, ZESTRIL) 20 mg tablet; Take 1 Tab by mouth daily. 2. MCI (mild cognitive impairment) with memory loss - Has had NS testing. Try low dose aricept. S/e discussed. -     donepezil (ARICEPT) 5 mg tablet; Take 1 Tab by mouth nightly.         Follow-up Disposition: Not on File    Jil Spatz, MD  2/21/2018

## 2018-02-21 NOTE — MR AVS SNAPSHOT
[address] [facility] 
 
 
 [address] [place] [address] 
[phone] Patient: Latoya Rojas MRN: [id number] :1935 Visit Information Date & Time Provider Department Dept. Phone Encounter #  
 2018 11:15 AM Meche Shanks MD [facility] [address] [id number] Upcoming Health Maintenance Date Due  
 GLAUCOMA SCREENING Q2Y 2002 DTaP/Tdap/Td series (3 - Td) 2017 MEDICARE YEARLY EXAM 2019 Allergies as of 2018  Review Complete On: 2018 By: Noemi Alexander LPN No Known Allergies Current Immunizations  Reviewed on 2017 Name Date Pneumococcal Conjugate (PCV-13) 2015 Pneumococcal Polysaccharide (PPSV-23) 2013, 2000 Tdap 2007, 10/27/1997 Zoster Vaccine, Live 2007 Not reviewed this visit You Were Diagnosed With   
  
 Codes Comments Essential hypertension    -  Primary ICD-10-CM: I10 
ICD-9-CM: 401.9 MCI (mild cognitive impairment) with memory loss     ICD-10-CM: G31.84 ICD-9-CM: 331.83, 780.93 Vitals BP Pulse Temp Height(growth percentile) Weight(growth percentile) SpO2  
 135/75 78 97.5 °F (36.4 °C) (Oral) 5' 3\" (1.6 m) 138 lb (62.6 kg) 98% BMI OB Status Smoking Status 24.45 kg/m2 Hysterectomy Never Smoker Vitals History BMI and BSA Data Body Mass Index Body Surface Area  
 24.45 kg/m 2 1.67 m 2 Preferred Pharmacy Pharmacy Name Phone [id number] [address] [facility], [address] [place] [address]  [phone] Your Updated Medication List  
  
   
This list is accurate as of 18 11:48 AM.  Always use your most recent med list.  
  
  
  
  
 Biotin 2,500 mcg Cap Take  by mouth.  
  
 cyanocobalamin 1,000 mcg tablet Take 1,000 mcg by mouth daily. diclofenac EC 75 mg EC tablet Commonly known as:  VOLTAREN  
 Take 1 Tab by mouth two (2) times a day. donepezil 5 mg tablet Commonly known as:  ARICEPT Take 1 Tab by mouth nightly. DULoxetine 60 mg capsule Commonly known as:  CYMBALTA Take 1 Cap by mouth daily. lisinopril 20 mg tablet Commonly known as:  [place] Take 1 Tab by mouth daily. multivitamin tablet Commonly known as:  ONE A DAY Take 1 Tab by mouth daily. simvastatin 40 mg tablet Commonly known as:  ZOCOR Take 1 Tab by mouth nightly. Prescriptions Sent to Pharmacy Refills  
 lisinopril (PRINIVIL, ZESTRIL) 20 mg tablet 1 Sig: Take 1 Tab by mouth daily. Class: Normal  
 Pharmacy: [address], [address] [facility] Ph #: [phone] Route: Oral  
 donepezil (ARICEPT) 5 mg tablet 4 Sig: Take 1 Tab by mouth nightly. Class: Normal  
 Pharmacy: [facility][id number]- [address] Ph #: [phone] Route: Oral  
  
To-Do List   
 02/21/2018 3:00 PM  
(Arrive by 2:45 PM) Appointment with [facility] DEXA/NIKITA 1 at [facility] ([phone]) Please, no calcium supplements or antacids that coat the stomach (ex: Tums, Mylanta) 24 hours prior to procedure. Maintain normal diet and medications. Dairy products are allowed. Wear an outfit with an elastic waistband (no zipper or metal snaps). Check in at registration 15min before your appointment time unless you were instructed to do otherwise. Please arrive 15 minutes prior to appointment to register 03/02/2018 3:30 PM  
(Arrive by 3:15 PM) Appointment with [facility] NIKITA 1 at [facility] ([phone]) Shower or bathe using soap and water. Do not use deodorant, powder, perfumes, or lotion the day of your exam.  If your prior mammograms were not performed at [facility] 6 please bring films with you or forward prior images 2 days before your procedure.   Check in at registration 15min before your appointment time unless you were instructed to do otherwise. A script is not necessary, but if you have one, please bring it on the day of the mammogram or have it faxed to the department. [facility] [phone] [facility] C[facility]  [phone] [facility] [place] [address] [place]  [phone] [facility] [phone] [address] [phone] Please arrive 15 minutes prior to appointment to register Patient Instructions High Blood Pressure: Care Instructions Your Care Instructions If your blood pressure is usually above 140/90, you have high blood pressure, or hypertension. That means the top number is 140 or higher or the bottom number is 90 or higher, or both. Despite what a lot of people think, high blood pressure usually doesn't cause headaches or make you feel dizzy or lightheaded. It usually has no symptoms. But it does increase your risk for heart attack, stroke, and kidney or eye damage. The higher your blood pressure, the more your risk increases. Your doctor will give you a goal for your blood pressure. Your goal will be based on your health and your age. An example of a goal is to keep your blood pressure below 140/90. Lifestyle changes, such as eating healthy and being active, are always important to help lower blood pressure. You might also take medicine to reach your blood pressure goal. 
Follow-up care is a key part of your treatment and safety. Be sure to make and go to all appointments, and call your doctor if you are having problems. It's also a good idea to know your test results and keep a list of the medicines you take. How can you care for yourself at home? Medical treatment · If you stop taking your medicine, your blood pressure will go back up. You may take one or more types of medicine to lower your blood pressure. Be safe with medicines. Take your medicine exactly as prescribed. Call your doctor if you think you are having a problem with your medicine. · Talk to your doctor before you start taking aspirin every day. Aspirin can help certain people lower their risk of a heart attack or stroke. But taking aspirin isn't right for everyone, because it can cause serious bleeding. · See your doctor regularly. You may need to see the doctor more often at first or until your blood pressure comes down. · If you are taking blood pressure medicine, talk to your doctor before you take decongestants or anti-inflammatory medicine, such as ibuprofen. Some of these medicines can raise blood pressure. · Learn how to check your blood pressure at home. Lifestyle changes · Stay at a healthy weight. This is especially important if you put on weight around the waist. Losing even 10 pounds can help you lower your blood pressure. · If your doctor recommends it, get more exercise. Walking is a good choice. Bit by bit, increase the amount you walk every day. Try for at least 30 minutes on most days of the week. You also may want to swim, bike, or do other activities. · Avoid or limit alcohol. Talk to your doctor about whether you can drink any alcohol. · Try to limit how much sodium you eat to less than 2,300 milligrams (mg) a day. Your doctor may ask you to try to eat less than 1,500 mg a day. · Eat plenty of fruits (such as bananas and oranges), vegetables, legumes, whole grains, and low-fat dairy products. · Lower the amount of saturated fat in your diet. Saturated fat is found in animal products such as milk, cheese, and meat. Limiting these foods may help you lose weight and also lower your risk for heart disease. · Do not smoke. Smoking increases your risk for heart attack and stroke. If you need help quitting, talk to your doctor about stop-smoking programs and medicines. These can increase your chances of quitting for good. When should you call for help? Call 911 anytime you think you may need emergency care.  This may mean having symptoms that suggest that your blood pressure is causing a serious heart or blood vessel problem. Your blood pressure may be over 180/110. ? For example, call 911 if: 
? · You have symptoms of a heart attack. These may include: ¨ Chest pain or pressure, or a strange feeling in the chest. 
¨ Sweating. ¨ Shortness of breath. ¨ Nausea or vomiting. ¨ Pain, pressure, or a strange feeling in the back, neck, jaw, or upper belly or in one or both shoulders or arms. ¨ Lightheadedness or sudden weakness. ¨ A fast or irregular heartbeat. ? · You have symptoms of a stroke. These may include: 
¨ Sudden numbness, tingling, weakness, or loss of movement in your face, arm, or leg, especially on only one side of your body. ¨ Sudden vision changes. ¨ Sudden trouble speaking. ¨ Sudden confusion or trouble understanding simple statements. ¨ Sudden problems with walking or balance. ¨ A sudden, severe headache that is different from past headaches. ? · You have severe back or belly pain. ?Do not wait until your blood pressure comes down on its own. Get help right away. ?Call your doctor now or seek immediate care if: 
? · Your blood pressure is much higher than normal (such as 180/110 or higher), but you don't have symptoms. ? · You think high blood pressure is causing symptoms, such as: ¨ Severe headache. ¨ Blurry vision. ? Watch closely for changes in your health, and be sure to contact your doctor if: 
? · Your blood pressure measures 140/90 or higher at least 2 times. That means the top number is 140 or higher or the bottom number is 90 or higher, or both. ? · You think you may be having side effects from your blood pressure medicine. ? · Your blood pressure is usually normal, but it goes above normal at least 2 times. Where can you learn more? Go to http://dar-albania.info/. Enter Z570 in the search box to learn more about \"High Blood Pressure: Care Instructions. \" 
 Current as of: September 21, 2016 Content Version: 11.4 © 9651-9614 Healthwise, [employer]. Care instructions adapted under license by [employer] (which disclaims liability or warranty for this information). If you have questions about a medical condition or this instruction, always ask your healthcare professional. [address] any warranty or liability for your use of this information. Introducing [facility] & HEALTH SERVICES! [facility] introduces [employer] patient portal. Now you can access parts of your medical record, email your doctor's office, and request medication refills online. 1. In your internet browser, go to [email or web address] 2. Click on the First Time User? Click Here link in the Sign In box. You will see the New Member Sign Up page. 3. Enter your [employer] Access Code exactly as it appears below. You will not need to use this code after youve completed the sign-up process. If you do not sign up before the expiration date, you must request a new code. · [employer] Access Code: [email or web address] Expires: 3/26/2018  4:27 PM 
 
4. Enter the last four digits of your Social Security Number (xxxx) and Date of Birth (mm/dd/yyyy) as indicated and click Submit. You will be taken to the next sign-up page. 5. Create a [employer] ID. This will be your [employer] login ID and cannot be changed, so think of one that is secure and easy to remember. 6. Create a [employer] password. You can change your password at any time. 7. Enter your Password Reset Question and Answer. This can be used at a later time if you forget your password. 8. Enter your e-mail address. You will receive e-mail notification when new information is available in [address] 9. Click Sign Up. You can now view and download portions of your medical record. 10. Click the Download Summary menu link to download a portable copy of your medical information. If you have questions, please visit the Frequently Asked Questions section of the [employer] website. Remember, [employer] is NOT to be used for urgent needs. For medical emergencies, dial 911. Now available from your iPhone and Android! Please provide this summary of care documentation to your next provider. Your primary care clinician is listed as Jerry Wang. If you have any questions after today's visit, please call [phone].

## 2018-02-22 DIAGNOSIS — G31.84 MCI (MILD COGNITIVE IMPAIRMENT) WITH MEMORY LOSS: ICD-10-CM

## 2018-02-22 RX ORDER — DONEPEZIL HYDROCHLORIDE 5 MG/1
5 TABLET, FILM COATED ORAL
Qty: 30 TAB | Refills: 4 | Status: SHIPPED | OUTPATIENT
Start: 2018-02-22 | End: 2018-05-30 | Stop reason: SDUPTHER

## 2018-05-30 ENCOUNTER — HOSPITAL ENCOUNTER (OUTPATIENT)
Dept: LAB | Age: 83
Discharge: HOME | End: 2018-05-30
Payer: MEDICARE

## 2018-05-30 ENCOUNTER — OFFICE VISIT (OUTPATIENT)
Dept: INTERNAL MEDICINE CLINIC | Age: 83
End: 2018-05-30

## 2018-05-30 VITALS
BODY MASS INDEX: 24.63 KG/M2 | HEART RATE: 86 BPM | TEMPERATURE: 97.6 F | SYSTOLIC BLOOD PRESSURE: 128 MMHG | DIASTOLIC BLOOD PRESSURE: 64 MMHG | WEIGHT: 139 LBS | HEIGHT: 63 IN | OXYGEN SATURATION: 98 % | RESPIRATION RATE: 16 BRPM

## 2018-05-30 DIAGNOSIS — F33.9 RECURRENT DEPRESSION (HCC): ICD-10-CM

## 2018-05-30 DIAGNOSIS — J30.9 ALLERGIC RHINITIS, UNSPECIFIED SEASONALITY, UNSPECIFIED TRIGGER: ICD-10-CM

## 2018-05-30 DIAGNOSIS — I10 ESSENTIAL HYPERTENSION: ICD-10-CM

## 2018-05-30 DIAGNOSIS — M19.90 ARTHRITIS: ICD-10-CM

## 2018-05-30 DIAGNOSIS — G31.84 MCI (MILD COGNITIVE IMPAIRMENT) WITH MEMORY LOSS: Primary | ICD-10-CM

## 2018-05-30 PROCEDURE — 36415 COLL VENOUS BLD VENIPUNCTURE: CPT

## 2018-05-30 PROCEDURE — 80048 BASIC METABOLIC PNL TOTAL CA: CPT

## 2018-05-30 RX ORDER — DONEPEZIL HYDROCHLORIDE 10 MG/1
10 TABLET, FILM COATED ORAL
Qty: 30 TAB | Refills: 3 | Status: SHIPPED | OUTPATIENT
Start: 2018-05-30 | End: 2018-08-30 | Stop reason: ALTCHOICE

## 2018-05-30 NOTE — MR AVS SNAPSHOT
[facility] Diehl 
 
 
 [address] [place] [address] [facility] 
[phone] Patient: Deanna Emerson MRN: [id number] :1935 Visit Information Date & Time Provider Department Dept. Phone Encounter #  
 2018 11:00 AM Koko Nicholson MD [facility] [address] [id number] Follow-up Instructions Return in about 3 months (around 2018). Upcoming Health Maintenance Date Due  
 GLAUCOMA SCREENING Q2Y 2002 DTaP/Tdap/Td series (3 - Td) 2017 Influenza Age 5 to Adult 2018 MEDICARE YEARLY EXAM 2019 Allergies as of 2018  Review Complete On:  By: Charla Loving No Known Allergies Current Immunizations  Reviewed on 2017 Name Date Pneumococcal Conjugate (PCV-13) 2015 Pneumococcal Polysaccharide (PPSV-23) 2013, 2000 Tdap 2007, 10/27/1997 Zoster Vaccine, Live 2007 Not reviewed this visit You Were Diagnosed With   
  
 Codes Comments MCI (mild cognitive impairment) with memory loss    -  Primary ICD-10-CM: G31.84 ICD-9-CM: 331.83, 780.93 Essential hypertension     ICD-10-CM: I10 
ICD-9-CM: 401.9 Recurrent depression ([facility] [address]     ICD-10-CM: F33.9 ICD-9-CM: 296.30 Arthritis     ICD-10-CM: M19.90 ICD-9-CM: 716.90 Vitals BP Pulse Temp Resp Height(growth percentile) Weight(growth percentile) 128/64 (BP 1 Location: Left arm, BP Patient Position: Sitting) 86 97.6 °F (36.4 °C) (Oral) 16 5' 3\" (1.6 m) 139 lb (63 kg) SpO2 BMI OB Status Smoking Status 98% 24.62 kg/m2 Hysterectomy Never Smoker Vitals History BMI and BSA Data Body Mass Index Body Surface Area  
 24.62 kg/m 2 1.67 m 2 Preferred Pharmacy Pharmacy Name Phone [facility] [address] AT  [address] [phone] Your Updated Medication List  
  
 This list is accurate as of 5/30/18 12:25 PM.  Always use your most recent med list.  
  
  
  
  
 Biotin 2,500 mcg Cap Take  by mouth.  
  
 cyanocobalamin 1,000 mcg tablet Take 1,000 mcg by mouth daily. diclofenac EC 75 mg EC tablet Commonly known as:  VOLTAREN Take 1 Tab by mouth two (2) times a day. donepezil 10 mg tablet Commonly known as:  ARICEPT Take 1 Tab by mouth nightly. DULoxetine 60 mg capsule Commonly known as:  CYMBALTA Take 1 Cap by mouth daily. lisinopril 20 mg tablet Commonly known as:  Opal Bills Take 1 Tab by mouth daily. multivitamin tablet Commonly known as:  ONE A DAY Take 1 Tab by mouth daily. simvastatin 40 mg tablet Commonly known as:  ZOCOR Take 1 Tab by mouth nightly. Prescriptions Sent to Pharmacy Refills  
 donepezil (ARICEPT) 10 mg tablet 3 Sig: Take 1 Tab by mouth nightly. Class: Normal  
 Pharmacy: [facility] AT [address] #: [phone] Route: Oral  
  
We Performed the Following METABOLIC PANEL, BASIC [40849 CPT(R)] Follow-up Instructions Return in about 3 months (around 8/30/2018). Introducing [facility] & HEALTH SERVICES! [employer] introduces [employer] patient portal. Now you can access parts of your medical record, email your doctor's office, and request medication refills online. 1. In your internet browser, go to [email or web address] 2. Click on the First Time User? Click Here link in the Sign In box. You will see the New Member Sign Up page. 3. Enter your [employer] Access Code exactly as it appears below. You will not need to use this code after youve completed the sign-up process. If you do not sign up before the expiration date, you must request a new code. · [employer] Access Code: [email or web address] Expires: 8/28/2018 12:25 PM 
 
 4. Enter the last four digits of your Social Security Number (xxxx) and Date of Birth (mm/dd/yyyy) as indicated and click Submit. You will be taken to the next sign-up page. 5. Create a [employer]de ID. This will be your [employer] login ID and cannot be changed, so think of one that is secure and easy to remember. 6. Create a [employer] password. You can change your password at any time. 7. Enter your Password Reset Question and Answer. This can be used at a later time if you forget your password. 8. Enter your e-mail address. You will receive e-mail notification when new information is available in [address] 9. Click Sign Up. You can now view and download portions of your medical record. 10. Click the Download Summary menu link to download a portable copy of your medical information. If you have questions, please visit the Frequently Asked Questions section of the [employer] website. Remember, [employer] is NOT to be used for urgent needs. For medical emergencies, dial 911. Now available from your iPhone and Android! Please provide this summary of care documentation to your next provider. Your primary care clinician is listed as Krystal Ohara. If you have any questions after today's visit, please call [phone].

## 2018-05-30 NOTE — PROGRESS NOTES
Mario Alberto Quiros is a [de-identified] y.o. female who presents today for Hypertension; Cholesterol Problem; Depression; Dementia; and Dizziness  . She has a history of   Patient Active Problem List   Diagnosis Code    [facility] F33.9    Essential hypertension I10    Hyperlipidemia E78.5    MCI (mild cognitive impairment) G31.84    Arthritis M19.90   . Today patient is here for f/u.   she does not have other concerns. Hypertension  Hypertension ROS: taking medications as instructed, no medication side effects noted, no TIA's, no chest pain on exertion, no dyspnea on exertion, no swelling of ankles     reports that she has never smoked. She has never used smokeless tobacco.    reports that she drinks about 4.2 - 8.4 oz of alcohol per week    BP Readings from Last 2 Encounters:   05/30/18 128/64   02/21/18 135/75     MCI: decided to start on aricept. Notes since that she is not having any s/e. Notes no changes in her symptoms. Still drinking, but much less. Not very active. Allergies: notes that better with a Claritin. Hip Pain: R side. Taking diclofenac. Also some L knee pain. Will check Cr. Mood and energy is good and stable. Still on SNRI. ROS  Review of Systems   Constitutional: Negative for chills, fever and weight loss. Eyes: Negative for photophobia and pain. Respiratory: Negative for cough, hemoptysis, sputum production and shortness of breath. Cardiovascular: Negative for chest pain, palpitations, orthopnea and leg swelling. Gastrointestinal: Negative for abdominal pain, blood in stool, constipation, diarrhea, nausea and vomiting. Genitourinary: Negative for dysuria, frequency and urgency. Musculoskeletal: Negative for back pain, myalgias and neck pain. Neurological: Negative. Negative for tingling, tremors, sensory change, speech change and headaches. Dizziness: previously. Endo/Heme/Allergies: Does not bruise/bleed easily.    Psychiatric/Behavioral: Positive for depression and memory loss. Negative for hallucinations, substance abuse and suicidal ideas. The patient is not nervous/anxious and does not have insomnia. Visit Vitals    /64 (BP 1 Location: Left arm, BP Patient Position: Sitting)    Pulse 86    Temp 97.6 °F (36.4 °C) (Oral)    Resp 16    Ht 5' 3\" (1.6 m)    Wt 139 lb (63 kg)    SpO2 98%    BMI 24.62 kg/m2       Physical Exam   Constitutional: She is oriented to person, place, and time. She appears well-developed and well-nourished. HENT:   Head: Normocephalic and atraumatic. Cardiovascular: Normal rate and regular rhythm. No murmur heard. Pulmonary/Chest: Effort normal. No respiratory distress. Neurological: She is alert and oriented to person, place, and time. Skin: Skin is warm and dry. Psychiatric: She has a normal mood and affect. Her behavior is normal.         Current Outpatient Prescriptions   Medication Sig    donepezil (ARICEPT) 10 mg tablet Take 1 Tab by mouth nightly.  Biotin 2,500 mcg cap Take  by mouth.  lisinopril (PRINIVIL, ZESTRIL) 20 mg tablet Take 1 Tab by mouth daily.  cyanocobalamin 1,000 mcg tablet Take 1,000 mcg by mouth daily.  multivitamin (ONE A DAY) tablet Take 1 Tab by mouth daily.  simvastatin (ZOCOR) 40 mg tablet Take 1 Tab by mouth nightly.  DULoxetine (CYMBALTA) 60 mg capsule Take 1 Cap by mouth daily.  diclofenac EC (VOLTAREN) 75 mg EC tablet Take 1 Tab by mouth two (2) times a day. No current facility-administered medications for this visit.          Past Medical History:   Diagnosis Date    Anxiety     Depression     Hypercholesterolemia     Hypertension       Past Surgical History:   Procedure Laterality Date    HX APPENDECTOMY  1945    HX HYSTERECTOMY  1978    HX KNEE REPLACEMENT Left 2015    HX TONSILLECTOMY  1944      Social History   Substance Use Topics    Smoking status: Never Smoker    Smokeless tobacco: Never Used    Alcohol use 4.2 - 8.4 oz/week     7 - 14 Glasses of wine per week      Family History   Problem Relation Age of Onset    Adopted: Yes        No Known Allergies     Assessment/Plan  Diagnoses and all orders for this visit:    1. MCI (mild cognitive impairment) with memory loss -No s/e, but ? If helping. Mood seems better. Try 10mg dose. Revisit in 3 mos. -     donepezil (ARICEPT) 10 mg tablet; Take 1 Tab by mouth nightly. 2. Essential hypertension - stable, no changes. 3. Recurrent depression (HCC) - stable mood. Continue SNRI.   -     METABOLIC PANEL, BASIC    4. Arthritis - on NSAIDs, check renal function.   -     METABOLIC PANEL, BASIC    5. Allergic rhinitis, unspecified seasonality, unspecified trigger - PRN antihistamine. Try to avoid sudafed. Follow-up Disposition:  Return in about 3 months (around 8/30/2018).     Elyse Banerjee MD  5/30/2018

## 2018-05-31 LAB
BUN SERPL-MCNC: 20 MG/DL (ref 8–27)
BUN/CREAT SERPL: 29 (ref 12–28)
CALCIUM SERPL-MCNC: 9.4 MG/DL (ref 8.7–10.3)
CHLORIDE SERPL-SCNC: 103 MMOL/L (ref 96–106)
CO2 SERPL-SCNC: 23 MMOL/L (ref 18–29)
CREAT SERPL-MCNC: 0.69 MG/DL (ref 0.57–1)
GFR SERPLBLD CREATININE-BSD FMLA CKD-EPI: 82 ML/MIN/1.73
GFR SERPLBLD CREATININE-BSD FMLA CKD-EPI: 95 ML/MIN/1.73
GLUCOSE SERPL-MCNC: 115 MG/DL (ref 65–99)
POTASSIUM SERPL-SCNC: 4.5 MMOL/L (ref 3.5–5.2)
SODIUM SERPL-SCNC: 140 MMOL/L (ref 134–144)

## 2018-07-12 ENCOUNTER — TELEPHONE (OUTPATIENT)
Dept: INTERNAL MEDICINE CLINIC | Age: 83
End: 2018-07-12

## 2018-08-30 ENCOUNTER — OFFICE VISIT (OUTPATIENT)
Dept: INTERNAL MEDICINE CLINIC | Age: 83
End: 2018-08-30

## 2018-08-30 VITALS
BODY MASS INDEX: 25.69 KG/M2 | DIASTOLIC BLOOD PRESSURE: 60 MMHG | HEART RATE: 62 BPM | WEIGHT: 145 LBS | SYSTOLIC BLOOD PRESSURE: 135 MMHG | RESPIRATION RATE: 16 BRPM | HEIGHT: 63 IN | TEMPERATURE: 98.1 F | OXYGEN SATURATION: 97 %

## 2018-08-30 DIAGNOSIS — I10 ESSENTIAL HYPERTENSION: Primary | ICD-10-CM

## 2018-08-30 DIAGNOSIS — G47.00 INSOMNIA, UNSPECIFIED TYPE: ICD-10-CM

## 2018-08-30 DIAGNOSIS — E78.5 HYPERLIPIDEMIA, UNSPECIFIED HYPERLIPIDEMIA TYPE: ICD-10-CM

## 2018-08-30 DIAGNOSIS — M25.561 CHRONIC PAIN OF RIGHT KNEE: ICD-10-CM

## 2018-08-30 DIAGNOSIS — E55.9 VITAMIN D DEFICIENCY: ICD-10-CM

## 2018-08-30 DIAGNOSIS — G89.29 CHRONIC PAIN OF RIGHT KNEE: ICD-10-CM

## 2018-08-30 DIAGNOSIS — F33.9 RECURRENT DEPRESSION (HCC): ICD-10-CM

## 2018-08-30 DIAGNOSIS — G31.84 MCI (MILD COGNITIVE IMPAIRMENT): ICD-10-CM

## 2018-08-30 RX ORDER — MELATONIN 5 MG
10 CAPSULE ORAL
COMMUNITY
End: 2019-09-18

## 2018-08-30 NOTE — PATIENT INSTRUCTIONS

## 2018-08-30 NOTE — PROGRESS NOTES
Davene Bamberger is a [de-identified] y.o. female who presents today for Hypertension; Cholesterol Problem; Depression; Memory Loss; and Insomnia Rowena Brown She has a history of  
Patient Active Problem List  
Diagnosis Code  Recurrent depression ([facility].) F33.9  Essential hypertension I10  
 Hyperlipidemia E78.5  MCI (mild cognitive impairment) G31.84  
 Arthritis M19.90 Rowena Ku Today patient is here for f/u. Hypertension - On ACEi, Doing well. Repeat borderline. Hypertension ROS: taking medications as instructed, no medication side effects noted, no TIA's, no chest pain on exertion, no dyspnea on exertion, no swelling of ankles     reports that she has never smoked. She has never used smokeless tobacco.    reports that she drinks about 4.2 - 8.4 oz of alcohol per week BP Readings from Last 2 Encounters:  
08/30/18 135/60  
05/30/18 128/64 MCI: decided to start on aricept. Notes since that she is not having any s/e. Notes no changes in her symptoms. Only drinking one glass of wine per day. Not very active. Knee pain: continued issues. Taking BID NSAIDs. Has stopped doing exercises. Would like to see ortho. Problem visit: 
Davene Bamberger is here for complaint of sleep. Problem began several month(s) ago. Severity is moderate Character of problem: problems going to sleep and staying asleep. No significant pain. ROS Review of Systems Constitutional: Negative for chills, fever, malaise/fatigue and weight loss. HENT: Negative for ear pain and hearing loss. Eyes: Negative for blurred vision, double vision, photophobia and pain. Respiratory: Negative for cough, hemoptysis and shortness of breath. Cardiovascular: Negative for chest pain, palpitations, orthopnea and leg swelling. Gastrointestinal: Negative for abdominal pain, constipation, diarrhea, heartburn, nausea and vomiting. Genitourinary: Negative for dysuria, frequency, hematuria and urgency. Musculoskeletal: Positive for joint pain. Negative for back pain, myalgias and neck pain. Skin: Negative for rash. Neurological: Negative. Endo/Heme/Allergies: Does not bruise/bleed easily. Psychiatric/Behavioral: Positive for memory loss. Negative for depression, hallucinations, substance abuse and suicidal ideas. The patient has insomnia. The patient is not nervous/anxious. Visit Vitals  /60  Pulse 62  Temp 98.1 °F (36.7 °C) (Oral)  Resp 16  
 Ht 5' 3\" (1.6 m)  Wt 145 lb (65.8 kg)  SpO2 97%  BMI 25.69 kg/m2 Physical Exam  
Constitutional: She is oriented to person, place, and time. She appears well-developed and well-nourished. HENT:  
Head: Normocephalic and atraumatic. Cardiovascular: Normal rate and regular rhythm. No murmur heard. Pulmonary/Chest: Effort normal. No respiratory distress. Musculoskeletal:  
Crepitus to R knee. S/p L TKA Neurological: She is alert and oriented to person, place, and time. Skin: Skin is warm and dry. Psychiatric: She has a normal mood and affect. Her behavior is normal.  
 
 
 
Current Outpatient Prescriptions Medication Sig  
 melatonin 5 mg cap capsule Take 5 mg by mouth nightly.  Biotin 2,500 mcg cap Take  by mouth.  lisinopril (PRINIVIL, ZESTRIL) 20 mg tablet Take 1 Tab by mouth daily.  cyanocobalamin 1,000 mcg tablet Take 1,000 mcg by mouth daily.  multivitamin (ONE A DAY) tablet Take 1 Tab by mouth daily.  simvastatin (ZOCOR) 40 mg tablet Take 1 Tab by mouth nightly.  DULoxetine (CYMBALTA) 60 mg capsule Take 1 Cap by mouth daily.  diclofenac EC (VOLTAREN) 75 mg EC tablet Take 1 Tab by mouth two (2) times a day. No current facility-administered medications for this visit. Past Medical History:  
Diagnosis Date  Anxiety  Depression  Hypercholesterolemia  Hypertension Past Surgical History:  
Procedure Laterality Date [address]  Wallace Alesia  HX KNEE REPLACEMENT Left 2015 [address] [place] Social History Substance Use Topics  Smoking status: Never Smoker  Smokeless tobacco: Never Used  Alcohol use 4.2 - 8.4 oz/week 7 - 14 Glasses of wine per week Family History Problem Relation Age of Onset  Adopted: Yes No Known Allergies Assessment/Plan Diagnoses and all orders for this visit: 1. Essential hypertension - stable. Continue ACEi.  
-     METABOLIC PANEL, COMPREHENSIVE; Future -     CBC WITH AUTOMATED DIFF; Future 2. Recurrent depression ([facility] - stable. 3. Hyperlipidemia, unspecified hyperlipidemia type - repeat in 6 mos. -     LIPID PANEL; Future 4. MCI (mild cognitive impairment) - no improvement with Aricept. Will stop and  
 
5. Chronic pain of right knee - On BID NSIADs. See ortho for further evaluation.  
-     REFERRAL TO ORTHOPEDIC SURGERY 6. Vitamin D deficiency 
-     VITAMIN D, 25 HYDROXY; Future 7. Insomnia, unspecified type - chronic. Discussed sleep hygiene and melatonin. Limit EtOH. See me if not better. Follow-up Disposition: 
Return in about 6 months (around 2/28/2019) for Physical . Sobeida Hoover MD 
8/30/2018

## 2018-08-30 NOTE — MR AVS SNAPSHOT
[facility] 
 
 
 [address] [place] [address] [place] [address] [facility] 
[phone] Patient: Alex Marie MRN: [id number] :1935 Visit Information Date & Time Provider Department Dept. Phone Encounter #  
 2018 11:15 AM Enmanuel Negron MD [facility] [address] [facility] [id number] Follow-up Instructions Return in about 6 months (around 2019) for Physical . Upcoming Health Maintenance Date Due  
 GLAUCOMA SCREENING Q2Y 2002 DTaP/Tdap/Td series (3 - Td) 2017 Influenza Age 5 to Adult 2018 MEDICARE YEARLY EXAM 2019 Allergies as of 2018  Review Complete On: 3/31/4198 By: Aminata Loving No Known Allergies Current Immunizations  Reviewed on 2017 Name Date Pneumococcal Conjugate (PCV-13) 2015 Pneumococcal Polysaccharide (PPSV-23) 2013, 2000 Tdap 2007, 10/27/1997 Zoster Vaccine, Live 2007 Not reviewed this visit You Were Diagnosed With   
  
 Codes Comments Essential hypertension    -  Primary ICD-10-CM: I10 
ICD-9-CM: 401.9 Recurrent depression ([place] [address]     ICD-10-CM: F33.9 ICD-9-CM: 296.30 Hyperlipidemia, unspecified hyperlipidemia type     ICD-10-CM: E78.5 ICD-9-CM: 272.4 MCI (mild cognitive impairment)     ICD-10-CM: G31.84 ICD-9-CM: 331.83 Chronic pain of right knee     ICD-10-CM: M25.561, G89.29 ICD-9-CM: 719.46, 338.29 Vitamin D deficiency     ICD-10-CM: E55.9 ICD-9-CM: 268.9 Insomnia, unspecified type     ICD-10-CM: G47.00 ICD-9-CM: 780.52 Vitals BP Pulse Temp Resp Height(growth percentile) Weight(growth percentile) 135/60 62 98.1 °F (36.7 °C) (Oral) 16 5' 3\" (1.6 m) 145 lb (65.8 kg) SpO2 BMI OB Status Smoking Status 97% 25.69 kg/m2 Hysterectomy Never Smoker Vitals History BMI and BSA Data  Body Mass Index Body Surface Area  
 25.69 kg/m 2 1.71 m 2  
  
  
 Preferred Pharmacy Pharmacy Name Phone [facility] [address] [phone] Your Updated Medication List  
  
   
This list is accurate as of 8/30/18 12:14 PM.  Always use your most recent med list.  
  
  
  
  
 Biotin 2,500 mcg Cap Take  by mouth.  
  
 cyanocobalamin 1,000 mcg tablet Take 1,000 mcg by mouth daily. diclofenac EC 75 mg EC tablet Commonly known as:  VOLTAREN Take 1 Tab by mouth two (2) times a day. DULoxetine 60 mg capsule Commonly known as:  CYMBALTA Take 1 Cap by mouth daily. lisinopril 20 mg tablet Commonly known as:  Anjali Velasquez Take 1 Tab by mouth daily. melatonin 5 mg Cap capsule Take 5 mg by mouth nightly. multivitamin tablet Commonly known as:  ONE A DAY Take 1 Tab by mouth daily. simvastatin 40 mg tablet Commonly known as:  ZOCOR Take 1 Tab by mouth nightly. We Performed the Following REFERRAL TO ORTHOPEDIC SURGERY [REF62 Custom] Follow-up Instructions Return in about 6 months (around 2/28/2019) for Physical . To-Do List   
 Around 12/25/2018 Lab:  CBC WITH AUTOMATED DIFF Around 12/25/2018 Lab:  LIPID PANEL Around 12/25/2018 Lab:  METABOLIC PANEL, COMPREHENSIVE Around 12/25/2018 Lab:  VITAMIN D, 25 HYDROXY Referral Information Referral ID Referred By Referred To  
  
 [id number] Hiro GARRISON Elijah [address], [address] [place] [address] Visits Status Start Date End Date 1 New Request 8/30/18 8/30/19 If your referral has a status of pending review or denied, additional information will be sent to support the outcome of this decision. Patient Instructions Learning About Sleeping Well What does sleeping well mean? Sleeping well means getting enough sleep.  How much sleep is enough varies among people. The number of hours you sleep is not as important as how you feel when you wake up. If you do not feel refreshed, you probably need more sleep. Another sign of not getting enough sleep is feeling tired during the day. The average total nightly sleep time is 7½ to 8 hours. Healthy adults may need a little more or a little less than this. Why is getting enough sleep important? Getting enough quality sleep is a basic part of good health. When your sleep suffers, your mood and your thoughts can suffer too. You may find yourself feeling more grumpy or stressed. Not getting enough sleep also can lead to serious problems, including injury, accidents, anxiety, and depression. What might cause poor sleeping? Many things can cause sleep problems, including: · Stress. Stress can be caused by fear about a single event, such as giving a speech. Or you may have ongoing stress, such as worry about work or school. · Depression, anxiety, and other mental or emotional conditions. · Changes in your sleep habits or surroundings. This includes changes that happen where you sleep, such as noise, light, or sleeping in a different bed. It also includes changes in your sleep pattern, such as having jet lag or working a late shift. · Health problems, such as pain, breathing problems, and restless legs syndrome. · Lack of regular exercise. How can you help yourself? Here are some tips that may help you sleep more soundly and wake up feeling more refreshed. Your sleeping area · Use your bedroom only for sleeping and sex. A bit of light reading may help you fall asleep. But if it doesn't, do your reading elsewhere in the house. Don't watch TV in bed. · Be sure your bed is big enough to stretch out comfortably, especially if you have a sleep partner. · Keep your bedroom quiet, dark, and cool. Use curtains, blinds, or a sleep mask to block out light.  To block out noise, use earplugs, soothing music, or a \"white noise\" machine. Your evening and bedtime routine · Create a relaxing bedtime routine. You might want to take a warm shower or bath, listen to soothing music, or drink a cup of noncaffeinated tea. · Go to bed at the same time every night. And get up at the same time every morning, even if you feel tired. What to avoid · Limit caffeine (coffee, tea, caffeinated sodas) during the day, and don't have any for at least 4 to 6 hours before bedtime. · Don't drink alcohol before bedtime. Alcohol can cause you to wake up more often during the night. · Don't smoke or use tobacco, especially in the evening. Nicotine can keep you awake. · Don't take naps during the day, especially close to bedtime. · Don't lie in bed awake for too long. If you can't fall asleep, or if you wake up in the middle of the night and can't get back to sleep within 15 minutes or so, get out of bed and go to another room until you feel sleepy. · Don't take medicine right before bed that may keep you awake or make you feel hyper or energized. Your doctor can tell you if your medicine may do this and if you can take it earlier in the day. If you can't sleep · Imagine yourself in a peaceful, pleasant scene. Focus on the details and feelings of being in a place that is relaxing. · Get up and do a quiet or boring activity until you feel sleepy. · Don't drink any liquids after 6 p.m. if you wake up often because you have to go to the bathroom. Where can you learn more? Go to http://dar-albania.info/. Enter Y214 in the search box to learn more about \"Learning About Sleeping Well. \" Current as of: December 7, 2017 Content Version: 11.7 © 9040-5614 EXPO. Care instructions adapted under license by [employer] (which disclaims liability or warranty for this information).  If you have questions about a medical condition or this instruction, always ask your healthcare professional. [place] [address] any warranty or liability for your use of this information. Introducing [facility]! [employer] introduces [employer] patient portal. Now you can access parts of your medical record, email your doctor's office, and request medication refills online. 1. In your internet browser, go to [email or web address] 2. Click on the First Time User? Click Here link in the Sign In box. You will see the New Member Sign Up page. 3. Enter your [employer] Access Code exactly as it appears below. You will not need to use this code after youve completed the sign-up process. If you do not sign up before the expiration date, you must request a new code. · [employer] Access Code: [email or web address] Expires: 11/28/2018 12:14 PM 
 
4. Enter the last four digits of your Social Security Number (xxxx) and Date of Birth (mm/dd/yyyy) as indicated and click Submit. You will be taken to the next sign-up page. 5. Create a [employer] ID. This will be your [employer] login ID and cannot be changed, so think of one that is secure and easy to remember. 6. Create a [employer] password. You can change your password at any time. 7. Enter your Password Reset Question and Answer. This can be used at a later time if you forget your password. 8. Enter your e-mail address. You will receive e-mail notification when new information is available in [address] 9. Click Sign Up. You can now view and download portions of your medical record. 10. Click the Download Summary menu link to download a portable copy of your medical information. If you have questions, please visit the Frequently Asked Questions section of the [employer] website. Remember, [employer] is NOT to be used for urgent needs. For medical emergencies, dial 911. Now available from your iPhone and Android! Please provide this summary of care documentation to your next provider. Your primary care clinician is listed as Maryanne Smart. If you have any questions after today's visit, please call [phone].

## 2018-09-19 DIAGNOSIS — I10 ESSENTIAL HYPERTENSION: ICD-10-CM

## 2018-09-20 RX ORDER — LISINOPRIL 20 MG/1
TABLET ORAL
Qty: 90 TAB | Refills: 1 | Status: SHIPPED | OUTPATIENT
Start: 2018-09-20 | End: 2019-04-19 | Stop reason: SDUPTHER

## 2018-12-25 DIAGNOSIS — I10 ESSENTIAL HYPERTENSION: ICD-10-CM

## 2018-12-25 DIAGNOSIS — E78.5 HYPERLIPIDEMIA, UNSPECIFIED HYPERLIPIDEMIA TYPE: ICD-10-CM

## 2018-12-25 DIAGNOSIS — E55.9 VITAMIN D DEFICIENCY: ICD-10-CM

## 2019-01-04 DIAGNOSIS — E78.5 HYPERLIPIDEMIA, UNSPECIFIED HYPERLIPIDEMIA TYPE: ICD-10-CM

## 2019-01-04 DIAGNOSIS — F33.9 RECURRENT DEPRESSION (HCC): ICD-10-CM

## 2019-01-04 DIAGNOSIS — M25.561 RIGHT KNEE PAIN, UNSPECIFIED CHRONICITY: ICD-10-CM

## 2019-01-04 RX ORDER — DICLOFENAC SODIUM 75 MG/1
TABLET, DELAYED RELEASE ORAL
Qty: 180 TAB | Refills: 1 | Status: SHIPPED | OUTPATIENT
Start: 2019-01-04 | End: 2019-06-04 | Stop reason: SDUPTHER

## 2019-01-04 RX ORDER — DULOXETIN HYDROCHLORIDE 60 MG/1
CAPSULE, DELAYED RELEASE ORAL
Qty: 90 CAP | Refills: 1 | Status: SHIPPED | OUTPATIENT
Start: 2019-01-04 | End: 2019-04-19 | Stop reason: SDUPTHER

## 2019-01-04 RX ORDER — SIMVASTATIN 40 MG/1
TABLET, FILM COATED ORAL
Qty: 90 TAB | Refills: 1 | Status: SHIPPED | OUTPATIENT
Start: 2019-01-04 | End: 2019-06-04 | Stop reason: SDUPTHER

## 2019-03-04 ENCOUNTER — OFFICE VISIT (OUTPATIENT)
Dept: INTERNAL MEDICINE CLINIC | Age: 84
End: 2019-03-04

## 2019-03-04 ENCOUNTER — HOSPITAL ENCOUNTER (OUTPATIENT)
Dept: LAB | Age: 84
Discharge: HOME | End: 2019-03-04
Payer: MEDICARE

## 2019-03-04 VITALS
HEART RATE: 63 BPM | WEIGHT: 157 LBS | SYSTOLIC BLOOD PRESSURE: 160 MMHG | RESPIRATION RATE: 12 BRPM | TEMPERATURE: 97.6 F | BODY MASS INDEX: 27.82 KG/M2 | DIASTOLIC BLOOD PRESSURE: 80 MMHG | HEIGHT: 63 IN | OXYGEN SATURATION: 98 %

## 2019-03-04 DIAGNOSIS — E78.5 HYPERLIPIDEMIA, UNSPECIFIED HYPERLIPIDEMIA TYPE: ICD-10-CM

## 2019-03-04 DIAGNOSIS — M19.90 ARTHRITIS: ICD-10-CM

## 2019-03-04 DIAGNOSIS — I10 ESSENTIAL HYPERTENSION: Primary | ICD-10-CM

## 2019-03-04 DIAGNOSIS — F33.9 RECURRENT DEPRESSION (HCC): ICD-10-CM

## 2019-03-04 DIAGNOSIS — G31.84 MCI (MILD COGNITIVE IMPAIRMENT): ICD-10-CM

## 2019-03-04 DIAGNOSIS — E55.9 VITAMIN D DEFICIENCY: ICD-10-CM

## 2019-03-04 PROCEDURE — 36415 COLL VENOUS BLD VENIPUNCTURE: CPT

## 2019-03-04 PROCEDURE — 82306 VITAMIN D 25 HYDROXY: CPT

## 2019-03-04 PROCEDURE — 80053 COMPREHEN METABOLIC PANEL: CPT

## 2019-03-04 PROCEDURE — 85025 COMPLETE CBC W/AUTO DIFF WBC: CPT

## 2019-03-04 PROCEDURE — 80061 LIPID PANEL: CPT

## 2019-03-04 RX ORDER — DONEPEZIL HYDROCHLORIDE 10 MG/1
10 TABLET, FILM COATED ORAL
Qty: 30 TAB | Refills: 5 | Status: SHIPPED | OUTPATIENT
Start: 2019-03-04 | End: 2019-05-29 | Stop reason: SDUPTHER

## 2019-03-04 RX ORDER — DONEPEZIL HYDROCHLORIDE 10 MG/1
10 TABLET, FILM COATED ORAL
Qty: 30 TAB | Refills: 0 | Status: SHIPPED | OUTPATIENT
Start: 2019-03-04 | End: 2019-03-04 | Stop reason: SDUPTHER

## 2019-03-04 NOTE — PROGRESS NOTES
Helen Tejada is a 82 y.o. female who presents today for Follow-up (6  month) and Medication Evaluation (pt would like to resume aricept) Mini Shaw She has a history of  
Patient Active Problem List  
Diagnosis Code  [facility] F33.9  Essential hypertension I10  
 Hyperlipidemia E78.5  MCI (mild cognitive impairment) G31.84  
 Arthritis M19.90 Mini Locke Today patient is here for follow-up. MCI: Patient attempted Aricept and after several months an understanding different therefore we stopped it. Overall anxiety depression are stable. Notes that she would like to resume the Aricept. She will be looking at [employer] but other classes she can take to stay mentally active Hypertension - still high on repeat. She is not checking this much at home. She will start checking this. Hypertension ROS: taking medications as instructed, no medication side effects noted, no TIA's, no chest pain on exertion, no dyspnea on exertion, no swelling of ankles     reports that  has never smoked. she has never used smokeless tobacco.    reports that she drinks about 4.2 - 8.4 oz of alcohol per week. BP Readings from Last 2 Encounters:  
03/04/19 172/82  
08/30/18 135/60 R Knee pain: continued issues. Taking BID NSAIDs. Had stopped doing exercises last visit. She has since seen orthopedist Dr. Ki Boland and is currently doing physical therapy. Overall her pain is resolved and much better. Sleep is been stable with melatonin. ROS Review of Systems Constitutional: Negative for chills, fever and weight loss. Respiratory: Negative for cough and shortness of breath. Cardiovascular: Negative for chest pain, palpitations and leg swelling. Gastrointestinal: Negative for abdominal pain, nausea and vomiting. Genitourinary: Negative for dysuria, frequency, hematuria and urgency. Musculoskeletal: Negative for back pain, joint pain, myalgias and neck pain. Right knee much better Neurological: Negative. Endo/Heme/Allergies: Does not bruise/bleed easily. Psychiatric/Behavioral: Positive for memory loss. Negative for depression. The patient is not nervous/anxious. Visit Vitals /82 (BP 1 Location: Right arm, BP Patient Position: Sitting) Pulse 63 Temp 97.6 °F (36.4 °C) (Oral) Ht 5' 3\" (1.6 m) Wt 157 lb (71.2 kg) SpO2 98% BMI 27.81 kg/m² Physical Exam  
Constitutional: She is oriented to person, place, and time. She appears well-developed and well-nourished. No distress. HENT:  
Head: Normocephalic and atraumatic. Neck: Normal range of motion. Neck supple. No thyromegaly present. Cardiovascular: Normal rate and regular rhythm. No murmur heard. Pulmonary/Chest: Effort normal and breath sounds normal. No respiratory distress. She has no wheezes. Abdominal: Soft. Bowel sounds are normal. She exhibits no distension. Musculoskeletal: She exhibits no edema. Neurological: She is alert and oriented to person, place, and time. No cranial nerve deficit. Skin: Skin is warm and dry. Psychiatric: She has a normal mood and affect. Her behavior is normal.  
 
 
 
Current Outpatient Medications Medication Sig  DULoxetine (CYMBALTA) 60 mg capsule TAKE 1 CAPSULE DAILY  simvastatin (ZOCOR) 40 mg tablet TAKE 1 TABLET NIGHTLY  diclofenac EC (VOLTAREN) 75 mg EC tablet TAKE 1 TABLET TWICE A DAY  lisinopril (PRINIVIL, ZESTRIL) 20 mg tablet TAKE 1 TABLET DAILY  melatonin 5 mg cap capsule Take 10 mg by mouth nightly.  Biotin 2,500 mcg cap Take  by mouth.  cyanocobalamin 1,000 mcg tablet Take 1,000 mcg by mouth daily.  multivitamin (ONE A DAY) tablet Take 1 Tab by mouth daily. No current facility-administered medications for this visit. Past Medical History:  
Diagnosis Date  Anxiety  Depression  Hypercholesterolemia  Hypertension Past Surgical History: Procedure Laterality Date [address]  Abdiaziz Dumas  HX KNEE REPLACEMENT Left 2015 [address] Social History Tobacco Use  Smoking status: Never Smoker  Smokeless tobacco: Never Used Substance Use Topics  Alcohol use: Yes Alcohol/week: 4.2 - 8.4 oz Types: 7 - 14 Glasses of wine per week Family History Adopted: Yes No Known Allergies Assessment/Plan Diagnoses and all orders for this visit: 1. Essential hypertension -elevated today. Discussed need to monitor this at home. They will let us know if it still elevated we can increase her to 30 or 40 mg. In the past she has been very sensitive to ACE inhibitors. -     METABOLIC PANEL, COMPREHENSIVE 
-     LIPID PANEL 
-     CBC WITH AUTOMATED DIFF 2. MCI (mild cognitive impairment) -daughter notes that short-term memory is getting worse. They would like to resume Aricept. Discussed taking half a tablet for a weeks to monitor for abdominal side effects. Will revisit in 3 months and consider Namenda 
-     donepezil (ARICEPT) 10 mg tablet; Take 1 Tab by mouth nightly. 3. Recurrent depression (HCC) -mood stable 4. Arthritis -right knee much better still on NSAID. Check creatinine 5. Vitamin D deficiency -repeat 
-     VITAMIN D, 25 HYDROXY 6. Hyperlipidemia, unspecified hyperlipidemia type -we will repeat this today. Follow-up Disposition: Not on File Jennifer Busch MD 
3/4/2019

## 2019-03-05 LAB
25(OH)D3+25(OH)D2 SERPL-MCNC: 26 NG/ML (ref 30–100)
ALBUMIN SERPL-MCNC: 4.7 G/DL (ref 3.5–4.7)
ALBUMIN/GLOB SERPL: 2 {RATIO} (ref 1.2–2.2)
ALP SERPL-CCNC: 63 IU/L (ref 39–117)
ALT SERPL-CCNC: 19 IU/L (ref 0–32)
AST SERPL-CCNC: 14 IU/L (ref 0–40)
BASOPHILS # BLD AUTO: 0 X10E3/UL (ref 0–0.2)
BASOPHILS NFR BLD AUTO: 1 %
BILIRUB SERPL-MCNC: 0.4 MG/DL (ref 0–1.2)
BUN SERPL-MCNC: 17 MG/DL (ref 8–27)
BUN/CREAT SERPL: 27 (ref 12–28)
CALCIUM SERPL-MCNC: 9.3 MG/DL (ref 8.7–10.3)
CHLORIDE SERPL-SCNC: 101 MMOL/L (ref 96–106)
CHOLEST SERPL-MCNC: 159 MG/DL (ref 100–199)
CO2 SERPL-SCNC: 23 MMOL/L (ref 20–29)
CREAT SERPL-MCNC: 0.64 MG/DL (ref 0.57–1)
EOSINOPHIL # BLD AUTO: 0.1 X10E3/UL (ref 0–0.4)
EOSINOPHIL NFR BLD AUTO: 3 %
ERYTHROCYTE [DISTWIDTH] IN BLOOD BY AUTOMATED COUNT: 13.8 % (ref 12.3–15.4)
GLOBULIN SER CALC-MCNC: 2.4 G/DL (ref 1.5–4.5)
GLUCOSE SERPL-MCNC: 101 MG/DL (ref 65–99)
HCT VFR BLD AUTO: 38.2 % (ref 34–46.6)
HDLC SERPL-MCNC: 52 MG/DL
HGB BLD-MCNC: 12.8 G/DL (ref 11.1–15.9)
IMM GRANULOCYTES # BLD AUTO: 0 X10E3/UL (ref 0–0.1)
IMM GRANULOCYTES NFR BLD AUTO: 0 %
INTERPRETATION, 910389: NORMAL
LDLC SERPL CALC-MCNC: 78 MG/DL (ref 0–99)
LYMPHOCYTES # BLD AUTO: 1.2 X10E3/UL (ref 0.7–3.1)
LYMPHOCYTES NFR BLD AUTO: 29 %
MCH RBC QN AUTO: 30.6 PG (ref 26.6–33)
MCHC RBC AUTO-ENTMCNC: 33.5 G/DL (ref 31.5–35.7)
MCV RBC AUTO: 91 FL (ref 79–97)
MONOCYTES # BLD AUTO: 0.4 X10E3/UL (ref 0.1–0.9)
MONOCYTES NFR BLD AUTO: 10 %
NEUTROPHILS # BLD AUTO: 2.4 X10E3/UL (ref 1.4–7)
NEUTROPHILS NFR BLD AUTO: 57 %
PLATELET # BLD AUTO: 277 X10E3/UL (ref 150–379)
POTASSIUM SERPL-SCNC: 4.1 MMOL/L (ref 3.5–5.2)
PROT SERPL-MCNC: 7.1 G/DL (ref 6–8.5)
RBC # BLD AUTO: 4.18 X10E6/UL (ref 3.77–5.28)
SODIUM SERPL-SCNC: 140 MMOL/L (ref 134–144)
TRIGL SERPL-MCNC: 146 MG/DL (ref 0–149)
VLDLC SERPL CALC-MCNC: 29 MG/DL (ref 5–40)
WBC # BLD AUTO: 4.2 X10E3/UL (ref 3.4–10.8)

## 2019-04-19 DIAGNOSIS — I10 ESSENTIAL HYPERTENSION: ICD-10-CM

## 2019-04-19 DIAGNOSIS — F33.9 RECURRENT DEPRESSION (HCC): ICD-10-CM

## 2019-04-19 RX ORDER — LISINOPRIL 20 MG/1
TABLET ORAL
Qty: 90 TAB | Refills: 1 | Status: SHIPPED | OUTPATIENT
Start: 2019-04-19 | End: 2019-09-18 | Stop reason: SDUPTHER

## 2019-04-19 RX ORDER — DULOXETIN HYDROCHLORIDE 60 MG/1
CAPSULE, DELAYED RELEASE ORAL
Qty: 90 CAP | Refills: 1 | Status: SHIPPED | OUTPATIENT
Start: 2019-04-19 | End: 2019-09-06 | Stop reason: SDUPTHER

## 2019-06-04 ENCOUNTER — OFFICE VISIT (OUTPATIENT)
Dept: INTERNAL MEDICINE CLINIC | Age: 84
End: 2019-06-04

## 2019-06-04 ENCOUNTER — HOSPITAL ENCOUNTER (OUTPATIENT)
Dept: LAB | Age: 84
Discharge: HOME | End: 2019-06-04
Payer: MEDICARE

## 2019-06-04 VITALS
SYSTOLIC BLOOD PRESSURE: 126 MMHG | WEIGHT: 153 LBS | RESPIRATION RATE: 16 BRPM | TEMPERATURE: 98.1 F | HEIGHT: 63 IN | OXYGEN SATURATION: 98 % | BODY MASS INDEX: 27.11 KG/M2 | HEART RATE: 65 BPM | DIASTOLIC BLOOD PRESSURE: 62 MMHG

## 2019-06-04 DIAGNOSIS — M19.90 ARTHRITIS: ICD-10-CM

## 2019-06-04 DIAGNOSIS — I10 ESSENTIAL HYPERTENSION: ICD-10-CM

## 2019-06-04 DIAGNOSIS — E78.5 HYPERLIPIDEMIA, UNSPECIFIED HYPERLIPIDEMIA TYPE: ICD-10-CM

## 2019-06-04 DIAGNOSIS — M25.561 RIGHT KNEE PAIN, UNSPECIFIED CHRONICITY: Primary | ICD-10-CM

## 2019-06-04 DIAGNOSIS — G31.84 MCI (MILD COGNITIVE IMPAIRMENT): ICD-10-CM

## 2019-06-04 PROBLEM — H25.13 AGE-RELATED NUCLEAR CATARACT OF BOTH EYES: Status: ACTIVE | Noted: 2018-05-01

## 2019-06-04 PROCEDURE — 36415 COLL VENOUS BLD VENIPUNCTURE: CPT

## 2019-06-04 PROCEDURE — 80048 BASIC METABOLIC PNL TOTAL CA: CPT

## 2019-06-04 RX ORDER — DICLOFENAC SODIUM 75 MG/1
TABLET, DELAYED RELEASE ORAL
Qty: 180 TAB | Refills: 1 | Status: SHIPPED | OUTPATIENT
Start: 2019-06-04 | End: 2019-06-04 | Stop reason: SDUPTHER

## 2019-06-04 RX ORDER — SIMVASTATIN 40 MG/1
TABLET, FILM COATED ORAL
Qty: 90 TAB | Refills: 1 | Status: SHIPPED | OUTPATIENT
Start: 2019-06-04 | End: 2019-09-18 | Stop reason: SDUPTHER

## 2019-06-04 RX ORDER — SIMVASTATIN 40 MG/1
TABLET, FILM COATED ORAL
Qty: 90 TAB | Refills: 1 | Status: SHIPPED | OUTPATIENT
Start: 2019-06-04 | End: 2019-06-04 | Stop reason: SDUPTHER

## 2019-06-04 RX ORDER — DICLOFENAC SODIUM 75 MG/1
TABLET, DELAYED RELEASE ORAL
Qty: 180 TAB | Refills: 1 | Status: SHIPPED | OUTPATIENT
Start: 2019-06-04 | End: 2019-09-18 | Stop reason: SDUPTHER

## 2019-06-05 LAB
BUN SERPL-MCNC: 18 MG/DL (ref 8–27)
BUN/CREAT SERPL: 22 (ref 12–28)
CALCIUM SERPL-MCNC: 9.7 MG/DL (ref 8.7–10.3)
CHLORIDE SERPL-SCNC: 103 MMOL/L (ref 96–106)
CO2 SERPL-SCNC: 23 MMOL/L (ref 20–29)
CREAT SERPL-MCNC: 0.82 MG/DL (ref 0.57–1)
GLUCOSE SERPL-MCNC: 170 MG/DL (ref 65–99)
POTASSIUM SERPL-SCNC: 4.5 MMOL/L (ref 3.5–5.2)
SODIUM SERPL-SCNC: 141 MMOL/L (ref 134–144)

## 2019-09-05 ENCOUNTER — TELEPHONE (OUTPATIENT)
Dept: INTERNAL MEDICINE CLINIC | Age: 84
End: 2019-09-05

## 2019-09-05 DIAGNOSIS — F33.9 RECURRENT DEPRESSION (HCC): ICD-10-CM

## 2019-09-05 NOTE — TELEPHONE ENCOUNTER
Pts daughter, Coleman Ibrahim, is requesting a refill on Cymbalta to be sent to [facility]. Please inform her of when script has been sent. Thanks.

## 2019-09-06 RX ORDER — DULOXETIN HYDROCHLORIDE 60 MG/1
CAPSULE, DELAYED RELEASE ORAL
Qty: 90 CAP | Refills: 1 | Status: SHIPPED | OUTPATIENT
Start: 2019-09-06 | End: 2019-09-18 | Stop reason: SDUPTHER

## 2019-09-18 ENCOUNTER — HOSPITAL ENCOUNTER (OUTPATIENT)
Dept: LAB | Age: 84
Discharge: HOME | End: 2019-09-18
Payer: MEDICARE

## 2019-09-18 ENCOUNTER — OFFICE VISIT (OUTPATIENT)
Dept: INTERNAL MEDICINE CLINIC | Age: 84
End: 2019-09-18

## 2019-09-18 VITALS
SYSTOLIC BLOOD PRESSURE: 138 MMHG | HEART RATE: 72 BPM | BODY MASS INDEX: 28.17 KG/M2 | RESPIRATION RATE: 16 BRPM | TEMPERATURE: 97.8 F | DIASTOLIC BLOOD PRESSURE: 78 MMHG | HEIGHT: 63 IN | WEIGHT: 159 LBS | OXYGEN SATURATION: 98 %

## 2019-09-18 DIAGNOSIS — Z12.12 SCREENING FOR COLORECTAL CANCER: ICD-10-CM

## 2019-09-18 DIAGNOSIS — Z23 ENCOUNTER FOR IMMUNIZATION: ICD-10-CM

## 2019-09-18 DIAGNOSIS — M25.561 RIGHT KNEE PAIN, UNSPECIFIED CHRONICITY: ICD-10-CM

## 2019-09-18 DIAGNOSIS — G31.84 MCI (MILD COGNITIVE IMPAIRMENT): ICD-10-CM

## 2019-09-18 DIAGNOSIS — R73.01 IFG (IMPAIRED FASTING GLUCOSE): ICD-10-CM

## 2019-09-18 DIAGNOSIS — Z71.89 ADVANCED DIRECTIVES, COUNSELING/DISCUSSION: ICD-10-CM

## 2019-09-18 DIAGNOSIS — F33.9 RECURRENT DEPRESSION (HCC): ICD-10-CM

## 2019-09-18 DIAGNOSIS — Z12.11 SCREENING FOR COLORECTAL CANCER: ICD-10-CM

## 2019-09-18 DIAGNOSIS — Z13.31 SCREENING FOR DEPRESSION: ICD-10-CM

## 2019-09-18 DIAGNOSIS — Z00.00 MEDICARE ANNUAL WELLNESS VISIT, SUBSEQUENT: Primary | ICD-10-CM

## 2019-09-18 DIAGNOSIS — E78.5 HYPERLIPIDEMIA, UNSPECIFIED HYPERLIPIDEMIA TYPE: ICD-10-CM

## 2019-09-18 DIAGNOSIS — I10 ESSENTIAL HYPERTENSION: ICD-10-CM

## 2019-09-18 DIAGNOSIS — R53.83 FATIGUE, UNSPECIFIED TYPE: ICD-10-CM

## 2019-09-18 DIAGNOSIS — M19.90 ARTHRITIS: ICD-10-CM

## 2019-09-18 DIAGNOSIS — R23.2 VASOMOTOR FLUSHING: ICD-10-CM

## 2019-09-18 DIAGNOSIS — Z12.39 SCREENING FOR BREAST CANCER: ICD-10-CM

## 2019-09-18 PROCEDURE — 83036 HEMOGLOBIN GLYCOSYLATED A1C: CPT

## 2019-09-18 PROCEDURE — 80048 BASIC METABOLIC PNL TOTAL CA: CPT

## 2019-09-18 PROCEDURE — 84443 ASSAY THYROID STIM HORMONE: CPT

## 2019-09-18 PROCEDURE — 85025 COMPLETE CBC W/AUTO DIFF WBC: CPT

## 2019-09-18 PROCEDURE — 80061 LIPID PANEL: CPT

## 2019-09-18 PROCEDURE — 36415 COLL VENOUS BLD VENIPUNCTURE: CPT

## 2019-09-18 RX ORDER — LISINOPRIL 20 MG/1
TABLET ORAL
Qty: 90 TAB | Refills: 1 | Status: SHIPPED | OUTPATIENT
Start: 2019-09-18 | End: 2020-06-12 | Stop reason: SDUPTHER

## 2019-09-18 RX ORDER — DONEPEZIL HYDROCHLORIDE 10 MG/1
TABLET, FILM COATED ORAL
Qty: 90 TAB | Refills: 1 | Status: SHIPPED | OUTPATIENT
Start: 2019-09-18 | End: 2020-03-31 | Stop reason: SDUPTHER

## 2019-09-18 RX ORDER — DICLOFENAC SODIUM 75 MG/1
TABLET, DELAYED RELEASE ORAL
Qty: 180 TAB | Refills: 1 | Status: SHIPPED | OUTPATIENT
Start: 2019-09-18 | End: 2020-07-07

## 2019-09-18 RX ORDER — DULOXETIN HYDROCHLORIDE 60 MG/1
CAPSULE, DELAYED RELEASE ORAL
Qty: 90 CAP | Refills: 1 | Status: SHIPPED | OUTPATIENT
Start: 2019-09-18 | End: 2020-07-08 | Stop reason: SDUPTHER

## 2019-09-18 RX ORDER — SIMVASTATIN 40 MG/1
TABLET, FILM COATED ORAL
Qty: 90 TAB | Refills: 1 | Status: SHIPPED | OUTPATIENT
Start: 2019-09-18 | End: 2020-06-12 | Stop reason: SDUPTHER

## 2019-09-18 NOTE — PROGRESS NOTES
Flu vaccine: Pt does receive the flu vaccine yearly, advised to wait until oct. Shingles vaccine: Pt has been educated on new Shingrix vaccines and where to obtain inj. Tdap: Pt last tdap was in 2007.

## 2019-09-18 NOTE — PROGRESS NOTES
Shanita Goldstein is a 82 y.o. female who presents today for Annual Wellness Visit  . She has a history of   Patient Active Problem List   Diagnosis Code    Recurrent depression ([facility] [address] F33.9    Essential hypertension I10    Hyperlipidemia E78.5    MCI (mild cognitive impairment) G31.84    Arthritis M19.90    Age-related nuclear cataract of both eyes H25.13   . Today patient is here for Medicare wellness exam.. Notes that she is having some hot flashes. A bit more fatigued. No changes in cognition. Hypertension -stable on current therapy. Hypertension ROS: taking medications as instructed, no medication side effects noted, no TIA's, no chest pain on exertion, no dyspnea on exertion, no swelling of ankles     reports that she has never smoked. She has never used smokeless tobacco.    reports that she drinks about 7.0 - 14.0 standard drinks of alcohol per week. BP Readings from Last 2 Encounters:   09/18/19 138/78   06/04/19 126/62     Hyperlipidemia  Currently she takes 40 mg of Zocor  ROS: taking medications as instructed, no medication side effects noted  No new myalgias, no joint pains, no weakness  No TIA's, no chest pain on exertion, no dyspnea on exertion, no swelling of ankles. Lab Results   Component Value Date/Time    Cholesterol, total 159 03/04/2019 12:27 PM    HDL Cholesterol 52 03/04/2019 12:27 PM    LDL, calculated 78 03/04/2019 12:27 PM    VLDL, calculated 29 03/04/2019 12:27 PM    Triglyceride 146 03/04/2019 12:27 PM     MCI: Patient is done better since being on Aricept. She also continues to take her Cymbalta. Anxiety/Depression: Notes that her mental health continues to do well. Knee arthritis: Patient continues to take NSAID. She has seen orthopedist in the past and has had injections in the past.  Daughter notes that they will try to cut out evening dose. Health maintenance hx includes:  Exercise: moderately active.   Form of exercise: walking   Diet: generally follows a low fat low cholesterol diet  Social: retired. Lives at home alone at [address]. Has 3 children.  for 2 yrs. From [place] originally, but moved here form [place]. She no longer drives. Her daughter helps her with her finances. Denies any recent falls. Screening:    Colon cancer screening:  Last Colonoscopy: would like FIT test.    Breast cancer screening: last mammogram 2016 and   was normal   Cervical cancer screening: last PAP/Pelvic exam:N/A   Osteoporosis screening:  Last BMD:  2008 and revealed    - Normal      Immunizations:     Immunization History   Administered Date(s) Administered    Influenza High Dose Vaccine PF 10/16/2018    Pneumococcal Conjugate (PCV-13) 08/14/2015    Pneumococcal Polysaccharide (PPSV-23) 11/17/2000, 02/25/2013    Tdap 10/27/1997, 06/01/2007, 09/18/2019    Zoster Vaccine, Live 06/01/2007      Immunization status: up to date and documented, Discussed shingles vaccine. ROS  Review of Systems   Constitutional: Negative for chills, fever, malaise/fatigue and weight loss. Hot flashes   HENT: Negative for congestion and sore throat. Eyes: Negative for blurred vision, double vision, photophobia and pain. Respiratory: Negative for cough, hemoptysis and shortness of breath. Cardiovascular: Negative for chest pain, palpitations, orthopnea, claudication and leg swelling. Gastrointestinal: Negative for abdominal pain, constipation, diarrhea, heartburn, nausea and vomiting. Genitourinary: Negative for dysuria, frequency and urgency. Musculoskeletal: Negative for back pain, joint pain, myalgias and neck pain. Skin: Negative for rash. Neurological: Negative. Negative for headaches. Endo/Heme/Allergies: Does not bruise/bleed easily. Psychiatric/Behavioral: Positive for depression (stable) and memory loss. Negative for hallucinations, substance abuse and suicidal ideas.  The patient is not nervous/anxious. Visit Vitals  /78   Pulse 72   Temp 97.8 °F (36.6 °C) (Oral)   Resp 16   Ht 5' 3\" (1.6 m)   Wt 159 lb (72.1 kg)   SpO2 98%   BMI 28.17 kg/m²       Physical Exam   Constitutional: She is oriented to person, place, and time. She appears well-developed and well-nourished. HENT:   Head: Normocephalic and atraumatic. Right Ear: External ear normal.   Left Ear: External ear normal.   Eyes: Pupils are equal, round, and reactive to light. EOM are normal.   Neck: Normal range of motion. Neck supple. No thyromegaly present. Cardiovascular: Normal rate and regular rhythm. No murmur heard. Pulmonary/Chest: Effort normal. No stridor. No respiratory distress. She has no wheezes. Abdominal: Soft. Bowel sounds are normal. She exhibits no distension and no mass. There is no tenderness. There is no guarding. Musculoskeletal: Normal range of motion. She exhibits no edema. Neurological: She is alert and oriented to person, place, and time. Skin: Skin is warm and dry. Psychiatric: She has a normal mood and affect. Her behavior is normal.         Current Outpatient Medications   Medication Sig    DULoxetine (CYMBALTA) 60 mg capsule TAKE 1 CAPSULE DAILY    diclofenac EC (VOLTAREN) 75 mg EC tablet TAKE 1 TABLET TWICE A DAY    simvastatin (ZOCOR) 40 mg tablet TAKE 1 TABLET NIGHTLY    lisinopril (PRINIVIL, ZESTRIL) 20 mg tablet TAKE 1 TABLET DAILY    donepezil (ARICEPT) 10 mg tablet TAKE 1 TABLET BY MOUTH EVERY NIGHT    Biotin 2,500 mcg cap Take  by mouth.  cyanocobalamin 1,000 mcg tablet Take 1,000 mcg by mouth daily.  multivitamin (ONE A DAY) tablet Take 1 Tab by mouth daily. No current facility-administered medications for this visit.          Past Medical History:   Diagnosis Date    Anxiety     Depression     Hypercholesterolemia     Hypertension       Past Surgical History:   Procedure Laterality Date    HX APPENDECTOMY  1945    HX HYSTERECTOMY  1    HX KNEE REPLACEMENT Left 2015    HX TONSILLECTOMY  1944      Social History     Tobacco Use    Smoking status: Never Smoker    Smokeless tobacco: Never Used   Substance Use Topics    Alcohol use: Yes     Alcohol/week: 7.0 - 14.0 standard drinks     Types: 7 - 14 Glasses of wine per week      Family History   Adopted: Yes        No Known Allergies     Assessment/Plan  Diagnoses and all orders for this visit:    1. Medicare annual wellness visit, subsequent -discussed with patient and daughter and they would like to continue mammogram and colorectal cancer screening for the time being. They have decided noninvasive colorectal cancer screening. Shiva Albright was counseled on age-appropriate/ guideline-based risk prevention behaviors and screening for a 82y.o. year old   female . We also discussed adjustments in screening based on family history if necessary. Printed instructions for preventative screening guidelines and healthy behaviors given to patient with after visit summary. 2. Recurrent depression ([address] - stable mood. -     DULoxetine (CYMBALTA) 60 mg capsule; TAKE 1 CAPSULE DAILY    3. MCI (mild cognitive impairment) - Stable memory. EtOH intake has decreased. Home is safe. -     donepezil (ARICEPT) 10 mg tablet; TAKE 1 TABLET BY MOUTH EVERY NIGHT    4. Essential hypertension -stable on current therapy. -     lisinopril (PRINIVIL, ZESTRIL) 20 mg tablet; TAKE 1 TABLET DAILY    5. Arthritis -try to limit use of NSAIDs as much as possible. -     diclofenac EC (VOLTAREN) 75 mg EC tablet; TAKE 1 TABLET TWICE A DAY    6. Hyperlipidemia, unspecified hyperlipidemia type -repeat this today. -     simvastatin (ZOCOR) 40 mg tablet; TAKE 1 TABLET NIGHTLY  -     METABOLIC PANEL, BASIC  -     CBC WITH AUTOMATED DIFF  -     LIPID PANEL    7.  Right knee pain, unspecified chronicity -patient to see orthopedist again if this continues to be a problem  -     diclofenac EC (VOLTAREN) 75 mg EC tablet; TAKE 1 TABLET TWICE A DAY    8. IFG (impaired fasting glucose)  -     HEMOGLOBIN A1C WITH EAG    9. Advanced directives, counseling/discussion    10. Screening for depression  -     DEPRESSION SCREEN ANNUAL    11. Screening for colorectal cancer  -     OCCULT BLOOD IMMUNOASSAY,DIAGNOSTIC    12. Vasomotor flushing -patient having some mild vasomotor symptoms as well as fatigue. Will check her thyroid. -     TSH 3RD GENERATION    13. Screening for breast cancer  -     [facility] MAMMO BI SCREENING INCL CAD; Future    14. Fatigue, unspecified type  -     TSH 3RD GENERATION    15. Encounter for immunization  -     GA IMMUNIZ ADMIN,1 SINGLE/COMB VAC/TOXOID  -     TETANUS, DIPHTHERIA TOXOIDS AND ACELLULAR PERTUSSIS VACCINE (TDAP), IN INDIVIDS. >=7, IM        Follow-up and Dispositions    · Return in about 6 months (around 3/18/2020). Thomas Osborn MD  9/18/2019    This note was created with the help of speech recognition software Caitlyn Morales) and may contain some 'sound alike' errors. This is the Subsequent Medicare Annual Wellness Exam, performed 12 months or more after the Initial AWV or the last Subsequent AWV    I have reviewed the patient's medical history in detail and updated the computerized patient record.      History     Past Medical History:   Diagnosis Date    Anxiety     Depression     Hypercholesterolemia     Hypertension       Past Surgical History:   Procedure Laterality Date    HX APPENDECTOMY  1945    HX HYSTERECTOMY  1978    HX KNEE REPLACEMENT Left 2015    HX TONSILLECTOMY  1944     Current Outpatient Medications   Medication Sig Dispense Refill    DULoxetine (CYMBALTA) 60 mg capsule TAKE 1 CAPSULE DAILY 90 Cap 1    diclofenac EC (VOLTAREN) 75 mg EC tablet TAKE 1 TABLET TWICE A  Tab 1    simvastatin (ZOCOR) 40 mg tablet TAKE 1 TABLET NIGHTLY 90 Tab 1    lisinopril (PRINIVIL, ZESTRIL) 20 mg tablet TAKE 1 TABLET DAILY 90 Tab 1    donepezil (ARICEPT) 10 mg tablet TAKE 1 TABLET BY MOUTH EVERY NIGHT 90 Tab 1    Biotin 2,500 mcg cap Take  by mouth.  cyanocobalamin 1,000 mcg tablet Take 1,000 mcg by mouth daily.  multivitamin (ONE A DAY) tablet Take 1 Tab by mouth daily. No Known Allergies  Family History   Adopted: Yes     Social History     Tobacco Use    Smoking status: Never Smoker    Smokeless tobacco: Never Used   Substance Use Topics    Alcohol use: Yes     Alcohol/week: 7.0 - 14.0 standard drinks     Types: 7 - 14 Glasses of wine per week     Patient Active Problem List   Diagnosis Code    Recurrent depression ([facility] [address] F33.9    Essential hypertension I10    Hyperlipidemia E78.5    MCI (mild cognitive impairment) G31.84    Arthritis M19.90    Age-related nuclear cataract of both eyes H25.13       Depression Risk Factor Screening:     3 most recent PHQ Screens 9/18/2019   PHQ Not Done -   Little interest or pleasure in doing things Not at all   Feeling down, depressed, irritable, or hopeless Not at all   Total Score PHQ 2 0     Alcohol Risk Factor Screening: You do not drink alcohol or very rarely. Functional Ability and Level of Safety:   Hearing Loss  Hearing is good. Activities of Daily Living  The home contains: no safety equipment. Patient does total self care    Fall Risk  Fall Risk Assessment, last 12 mths 9/18/2019   Able to walk? Yes   Fall in past 12 months? No       Abuse Screen  Patient is not abused    Cognitive Screening   Evaluation of Cognitive Function:  Has your family/caregiver stated any concerns about your memory: no  Abnormal    Patient Care Team   Patient Care Team:  Carlos Manuel Robles MD as PCP - General (Internal Medicine)    Assessment/Plan   Education and counseling provided:  Are appropriate based on today's review and evaluation  End-of-Life planning (with patient's consent)  Screening Mammography  Colorectal cancer screening tests  Bone mass measurement (DEXA)    Diagnoses and all orders for this visit:    1.  Medicare annual wellness visit, subsequent    2. Recurrent depression (HCC)  -     DULoxetine (CYMBALTA) 60 mg capsule; TAKE 1 CAPSULE DAILY    3. MCI (mild cognitive impairment)  -     donepezil (ARICEPT) 10 mg tablet; TAKE 1 TABLET BY MOUTH EVERY NIGHT    4. Essential hypertension  -     lisinopril (PRINIVIL, ZESTRIL) 20 mg tablet; TAKE 1 TABLET DAILY    5. Arthritis  -     diclofenac EC (VOLTAREN) 75 mg EC tablet; TAKE 1 TABLET TWICE A DAY    6. Hyperlipidemia, unspecified hyperlipidemia type  -     simvastatin (ZOCOR) 40 mg tablet; TAKE 1 TABLET NIGHTLY  -     METABOLIC PANEL, BASIC  -     CBC WITH AUTOMATED DIFF  -     LIPID PANEL    7. Right knee pain, unspecified chronicity  -     diclofenac EC (VOLTAREN) 75 mg EC tablet; TAKE 1 TABLET TWICE A DAY    8. IFG (impaired fasting glucose)  -     HEMOGLOBIN A1C WITH EAG    9. Advanced directives, counseling/discussion    10. Screening for depression  -     DEPRESSION SCREEN ANNUAL    11. Screening for colorectal cancer  -     OCCULT BLOOD IMMUNOASSAY,DIAGNOSTIC    12. Vasomotor flushing  -     TSH 3RD GENERATION    13. Screening for breast cancer  -     [facility] MAMMO BI SCREENING INCL CAD; Future    14. Fatigue, unspecified type  -     TSH 3RD GENERATION    15.  Encounter for immunization  -     ID IMMUNIZ ADMIN,1 SINGLE/COMB VAC/TOXOID  -     TETANUS, DIPHTHERIA TOXOIDS AND ACELLULAR PERTUSSIS VACCINE (TDAP), IN INDIVIDS. >=7, IM        Health Maintenance Due   Topic Date Due    Shingrix Vaccine Age 49> (1 of 2) 12/08/1987    DTaP/Tdap/Td series (3 - Td) 06/01/2017    MEDICARE YEARLY EXAM  02/01/2019    Influenza Age 9 to Adult  08/01/2019

## 2019-09-18 NOTE — ACP (ADVANCE CARE PLANNING)
Advance Care Planning    Advance Care Planning (ACP) Provider Conversation Snapshot    Date of ACP Conversation: 09/18/19  Persons included in Conversation:  patient  Length of ACP Conversation in minutes:  <16 minutes (Non-Billable)    Authorized Decision Maker (if patient is incapable of making informed decisions):    This person is:   Healthcare Agent/Medical Power of  under Advance Directive            For Patients with Decision Making Capacity:   Values/Goals: Exploration of values, goals, and preferences if recovery is not expected, even with continued medical treatment in the event of:  Imminent death  Severe, permanent brain injury    Conversation Outcomes / Follow-Up Plan:   Recommended review of completed ACP document annually or upon change in health status

## 2019-09-18 NOTE — PATIENT INSTRUCTIONS
Medicare Wellness Visit, Female The best way to live healthy is to have a lifestyle where you eat a well-balanced diet, exercise regularly, limit alcohol use, and quit all forms of tobacco/nicotine, if applicable. Regular preventive services are another way to keep healthy. Preventive services (vaccines, screening tests, monitoring & exams) can help personalize your care plan, which helps you manage your own care. Screening tests can find health problems at the earliest stages, when they are easiest to treat. [facility] follows the current, evidence-based guidelines published by the [facility]ir[facility] ([facility]) when recommending preventive services for our patients. Because we follow these guidelines, sometimes recommendations change over time as research supports it. (For example, mammograms used to be recommended annually. Even though Medicare will still pay for an annual mammogram, the newer guidelines recommend a mammogram every two years for women of average risk.) Of course, you and your doctor may decide to screen more often for some diseases, based on your risk and your health status. Preventive services for you include: - Medicare offers their members a free annual wellness visit, which is time for you and your primary care provider to discuss and plan for your preventive service needs. Take advantage of this benefit every year! 
-All adults over the age of 72 should receive the recommended pneumonia vaccines. Current USPSTF guidelines recommend a series of two vaccines for the best pneumonia protection.  
-All adults should have a flu vaccine yearly and a tetanus vaccine every 10 years. All adults age 61 and older should receive a shingles vaccine once in their lifetime.   
-A bone mass density test is recommended when a woman turns 65 to screen for osteoporosis. This test is only recommended one time, as a screening. Some providers will use this same test as a disease monitoring tool if you already have osteoporosis. -All adults age 38-68 who are overweight should have a diabetes screening test once every three years.  
-Other screening tests and preventive services for persons with diabetes include: an eye exam to screen for diabetic retinopathy, a kidney function test, a foot exam, and stricter control over your cholesterol.  
-Cardiovascular screening for adults with routine risk involves an electrocardiogram (ECG) at intervals determined by your doctor.  
-Colorectal cancer screenings should be done for adults age 54-65 with no increased risk factors for colorectal cancer. There are a number of acceptable methods of screening for this type of cancer. Each test has its own benefits and drawbacks. Discuss with your doctor what is most appropriate for you during your annual wellness visit. The different tests include: colonoscopy (considered the best screening method), a fecal occult blood test, a fecal DNA test, and sigmoidoscopy. -Breast cancer screenings are recommended every other year for women of normal risk, age 54-69. 
-Cervical cancer screenings for women over age 72 are only recommended with certain risk factors.  
-All adults born between [facility] should be screened once for Hepatitis C. Here is a list of your current Health Maintenance items (your personalized list of preventive services) with a due date: 
Health Maintenance Due Topic Date Due  Shingles Vaccine (1 of 2) 12/08/1987  
 DTaP/Tdap/Td  (3 - Td) 06/01/2017 [facility] Annual Well Visit  02/01/2019  Flu Vaccine  08/01/2019

## 2019-09-19 LAB
BASOPHILS # BLD AUTO: 0 X10E3/UL (ref 0–0.2)
BASOPHILS NFR BLD AUTO: 1 %
BUN SERPL-MCNC: 25 MG/DL (ref 8–27)
BUN/CREAT SERPL: 33 (ref 12–28)
CALCIUM SERPL-MCNC: 10.1 MG/DL (ref 8.7–10.3)
CHLORIDE SERPL-SCNC: 102 MMOL/L (ref 96–106)
CHOLEST SERPL-MCNC: 178 MG/DL (ref 100–199)
CO2 SERPL-SCNC: 25 MMOL/L (ref 20–29)
CREAT SERPL-MCNC: 0.76 MG/DL (ref 0.57–1)
EOSINOPHIL # BLD AUTO: 0.1 X10E3/UL (ref 0–0.4)
EOSINOPHIL NFR BLD AUTO: 3 %
ERYTHROCYTE [DISTWIDTH] IN BLOOD BY AUTOMATED COUNT: 12.9 % (ref 12.3–15.4)
EST. AVERAGE GLUCOSE BLD GHB EST-MCNC: 128 MG/DL
GLUCOSE SERPL-MCNC: 118 MG/DL (ref 65–99)
HBA1C MFR BLD: 6.1 % (ref 4.8–5.6)
HCT VFR BLD AUTO: 37.6 % (ref 34–46.6)
HDLC SERPL-MCNC: 48 MG/DL
HGB BLD-MCNC: 12.6 G/DL (ref 11.1–15.9)
IMM GRANULOCYTES # BLD AUTO: 0 X10E3/UL (ref 0–0.1)
IMM GRANULOCYTES NFR BLD AUTO: 0 %
INTERPRETATION, 910389: NORMAL
LDLC SERPL CALC-MCNC: 95 MG/DL (ref 0–99)
LYMPHOCYTES # BLD AUTO: 1 X10E3/UL (ref 0.7–3.1)
LYMPHOCYTES NFR BLD AUTO: 22 %
MCH RBC QN AUTO: 30.4 PG (ref 26.6–33)
MCHC RBC AUTO-ENTMCNC: 33.5 G/DL (ref 31.5–35.7)
MCV RBC AUTO: 91 FL (ref 79–97)
MONOCYTES # BLD AUTO: 0.6 X10E3/UL (ref 0.1–0.9)
MONOCYTES NFR BLD AUTO: 12 %
NEUTROPHILS # BLD AUTO: 2.9 X10E3/UL (ref 1.4–7)
NEUTROPHILS NFR BLD AUTO: 62 %
PLATELET # BLD AUTO: 290 X10E3/UL (ref 150–450)
POTASSIUM SERPL-SCNC: 5.1 MMOL/L (ref 3.5–5.2)
RBC # BLD AUTO: 4.15 X10E6/UL (ref 3.77–5.28)
SODIUM SERPL-SCNC: 140 MMOL/L (ref 134–144)
TRIGL SERPL-MCNC: 174 MG/DL (ref 0–149)
TSH SERPL DL<=0.005 MIU/L-ACNC: 1.74 UIU/ML (ref 0.45–4.5)
VLDLC SERPL CALC-MCNC: 35 MG/DL (ref 5–40)
WBC # BLD AUTO: 4.7 X10E3/UL (ref 3.4–10.8)

## 2019-09-21 PROBLEM — R73.01 IFG (IMPAIRED FASTING GLUCOSE): Status: ACTIVE | Noted: 2019-09-21

## 2019-10-14 DIAGNOSIS — I10 ESSENTIAL HYPERTENSION: ICD-10-CM

## 2019-10-14 RX ORDER — LISINOPRIL 20 MG/1
TABLET ORAL
Qty: 90 TAB | Refills: 1 | Status: SHIPPED | OUTPATIENT
Start: 2019-10-14 | End: 2020-09-22 | Stop reason: SDUPTHER

## 2020-03-31 DIAGNOSIS — G31.84 MCI (MILD COGNITIVE IMPAIRMENT): ICD-10-CM

## 2020-03-31 NOTE — TELEPHONE ENCOUNTER
----- Message from Arnel Wesley sent at 3/31/2020 10:57 AM EDT -----  Regarding: Dr. Hannah Perez: [phone] (if not patient): Robby Boogie   Relationship of caller (if not patient): (CHILD)   Best contact number(s): [phone]  Name of medication and dosage if known: \"Aricept\" 20mg   Is patient out of this medication (yes/no): 6 more days  Pharmacy name: [address] [place] [address] listed in chart? (yes/no): no  Pharmacy phone number: [phone]  Date of last visit: Wednesday, September 18, 2019 11:00 AM  Details to clarify the request: N/A

## 2020-04-01 RX ORDER — DONEPEZIL HYDROCHLORIDE 10 MG/1
TABLET, FILM COATED ORAL
Qty: 90 TAB | Refills: 1 | Status: SHIPPED | OUTPATIENT
Start: 2020-04-01 | End: 2020-07-07

## 2020-05-19 ENCOUNTER — HOSPITAL ENCOUNTER (OUTPATIENT)
Dept: MAMMOGRAPHY | Age: 85
Discharge: HOME | End: 2020-05-19
Attending: INTERNAL MEDICINE
Payer: MEDICARE

## 2020-05-19 DIAGNOSIS — Z12.31 ENCOUNTER FOR SCREENING MAMMOGRAM FOR MALIGNANT NEOPLASM OF BREAST: ICD-10-CM

## 2020-05-19 PROCEDURE — 77067 SCR MAMMO BI INCL CAD: CPT

## 2020-05-20 ENCOUNTER — OFFICE VISIT (OUTPATIENT)
Dept: INTERNAL MEDICINE CLINIC | Age: 85
End: 2020-05-20

## 2020-05-20 VITALS
HEIGHT: 63 IN | SYSTOLIC BLOOD PRESSURE: 172 MMHG | BODY MASS INDEX: 28.7 KG/M2 | DIASTOLIC BLOOD PRESSURE: 75 MMHG | TEMPERATURE: 98.4 F | OXYGEN SATURATION: 95 % | HEART RATE: 75 BPM | WEIGHT: 162 LBS | RESPIRATION RATE: 18 BRPM

## 2020-05-20 DIAGNOSIS — L29.9 ITCH: Primary | ICD-10-CM

## 2020-05-20 DIAGNOSIS — L13.9 BULLOUS DISORDER: ICD-10-CM

## 2020-05-20 DIAGNOSIS — G31.84 MCI (MILD COGNITIVE IMPAIRMENT): ICD-10-CM

## 2020-05-20 RX ORDER — HYDROXYZINE 25 MG/1
25 TABLET, FILM COATED ORAL
Qty: 10 TAB | Refills: 0 | Status: SHIPPED | OUTPATIENT
Start: 2020-05-20 | End: 2020-05-30

## 2020-05-20 NOTE — PROGRESS NOTES
HISTORY OF PRESENT ILLNESS  Keira Paul is a 82 y.o. female. HPI  Seen with skin lesions which are itchy and per her dt developed about 10 days ago but have spread and now involve scalp and back and chest and arms. No sores in mouth., no fever or chills, no sob or wheeze. No new meds-state all meds on for at least 1 year. No illness recently. No otc meds. No one else is having skin issues. Dt notes originally looked like patches of dry skin but now much worse and lesions on scalp bleed easily. Made a derm appt for end of next week but could not be seen sooner. Review of Systems   Constitutional: Negative for chills, diaphoresis and fever. Respiratory: Negative for cough, sputum production and shortness of breath. Cardiovascular: Negative for chest pain. Gastrointestinal: Negative for abdominal pain. Skin: Positive for itching and rash. Physical Exam  Vitals signs and nursing note reviewed. Constitutional:       Appearance: She is well-developed. HENT:      Head: Normocephalic and atraumatic. Neck:      Musculoskeletal: Normal range of motion and neck supple. Thyroid: No thyromegaly. Vascular: No carotid bruit. Cardiovascular:      Rate and Rhythm: Normal rate and regular rhythm. Heart sounds: Normal heart sounds, S1 normal and S2 normal. No murmur. Pulmonary:      Effort: Pulmonary effort is normal. No respiratory distress. Breath sounds: Normal breath sounds. No wheezing or rales. Skin:     Comments: Bullous appearing lesions on parietal region of scalp with evidence of excoriation, lesions on ant chest some appear open and oozing, multiple discrete lesions on back some scabbed and some appear bullous, nothing on palms or face   Neurological:      Mental Status: She is alert. Psychiatric:         Behavior: Behavior normal.         ASSESSMENT and PLAN  Diagnoses and all orders for this visit:    1. Itch  -     hydrOXYzine HCL (ATARAX) 25 mg tablet;  Take 1 Tab by mouth nightly as needed for Itching for up to 10 days. 2. 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  MCI (mild cognitive impairment)

## 2020-05-20 NOTE — Clinical Note
Can you call dr lopez's office re appt -they have one end of next week  But I would like it if could be seen sooner ? bullous pemphigoid

## 2020-05-29 ENCOUNTER — TELEPHONE (OUTPATIENT)
Dept: INTERNAL MEDICINE CLINIC | Age: 85
End: 2020-05-29

## 2020-05-29 DIAGNOSIS — R92.8 ABNORMAL MAMMOGRAM: Primary | ICD-10-CM

## 2020-06-05 ENCOUNTER — HOSPITAL ENCOUNTER (OUTPATIENT)
Dept: MAMMOGRAPHY | Age: 85
Discharge: HOME | End: 2020-06-05
Attending: INTERNAL MEDICINE
Payer: MEDICARE

## 2020-06-05 DIAGNOSIS — R92.8 ABNORMAL MAMMOGRAM: ICD-10-CM

## 2020-06-05 PROCEDURE — 77065 DX MAMMO INCL CAD UNI: CPT

## 2020-06-08 ENCOUNTER — TELEPHONE (OUTPATIENT)
Dept: INTERNAL MEDICINE CLINIC | Age: 85
End: 2020-06-08

## 2020-06-08 DIAGNOSIS — R92.8 ABNORMAL MAMMOGRAM: Primary | ICD-10-CM

## 2020-06-08 DIAGNOSIS — R92.8 ABNORMAL MAMMOGRAM OF LEFT BREAST: ICD-10-CM

## 2020-06-08 DIAGNOSIS — R92.8 ABNORMAL MAMMOGRAM OF LEFT BREAST: Primary | ICD-10-CM

## 2020-06-09 NOTE — TELEPHONE ENCOUNTER
Demetria Josue with [facility] is requesting we take off \" additional \" on the order , and then everything will be perfect with the order       Any questions she can be reached at [phone]

## 2020-06-12 ENCOUNTER — TELEPHONE (OUTPATIENT)
Dept: INTERNAL MEDICINE CLINIC | Age: 85
End: 2020-06-12

## 2020-06-12 DIAGNOSIS — E78.5 HYPERLIPIDEMIA, UNSPECIFIED HYPERLIPIDEMIA TYPE: ICD-10-CM

## 2020-06-12 DIAGNOSIS — I10 ESSENTIAL HYPERTENSION: ICD-10-CM

## 2020-06-12 RX ORDER — LISINOPRIL 20 MG/1
TABLET ORAL
Qty: 90 TAB | Refills: 1 | Status: SHIPPED | OUTPATIENT
Start: 2020-06-12 | End: 2020-06-29 | Stop reason: SDUPTHER

## 2020-06-12 RX ORDER — SIMVASTATIN 40 MG/1
TABLET, FILM COATED ORAL
Qty: 90 TAB | Refills: 1 | Status: SHIPPED | OUTPATIENT
Start: 2020-06-12 | End: 2020-09-22 | Stop reason: SDUPTHER

## 2020-06-12 NOTE — TELEPHONE ENCOUNTER
Pt's daughter, Arely Boswell, on HIPAA R/C to office advising Pt does not have an appointment with breast surgeon and will need one scheduled. She also advised that Pt has not been taking Aricept as they did not see an improvement. She is requesting lisinopril and Zocor to be refilled. Rx have been sent to pharmacy.

## 2020-06-15 NOTE — TELEPHONE ENCOUNTER
[address], appt to be scheduled once results came back from procedure on 6/18/20. Pt's daughter has been notified.          [phone]

## 2020-06-18 ENCOUNTER — HOSPITAL ENCOUNTER (OUTPATIENT)
Dept: MAMMOGRAPHY | Age: 85
Discharge: HOME | End: 2020-06-18
Attending: INTERNAL MEDICINE
Payer: MEDICARE

## 2020-06-18 DIAGNOSIS — R92.8 ABNORMAL MAMMOGRAM OF LEFT BREAST: ICD-10-CM

## 2020-06-18 DIAGNOSIS — R92.1 BREAST CALCIFICATIONS: ICD-10-CM

## 2020-06-18 PROCEDURE — 88341 IMHCHEM/IMCYTCHM EA ADD ANTB: CPT

## 2020-06-18 PROCEDURE — 74011000250 HC RX REV CODE- 250: Performed by: RADIOLOGY

## 2020-06-18 PROCEDURE — 88342 IMHCHEM/IMCYTCHM 1ST ANTB: CPT

## 2020-06-18 PROCEDURE — 88305 TISSUE EXAM BY PATHOLOGIST: CPT

## 2020-06-18 PROCEDURE — 19081 BX BREAST 1ST LESION STRTCTC: CPT

## 2020-06-18 PROCEDURE — 77065 DX MAMMO INCL CAD UNI: CPT

## 2020-06-18 PROCEDURE — 88360 TUMOR IMMUNOHISTOCHEM/MANUAL: CPT

## 2020-06-18 RX ORDER — LIDOCAINE HYDROCHLORIDE 10 MG/ML
12 INJECTION INFILTRATION; PERINEURAL
Status: COMPLETED | OUTPATIENT
Start: 2020-06-18 | End: 2020-06-18

## 2020-06-18 RX ORDER — LIDOCAINE HYDROCHLORIDE AND EPINEPHRINE 10; 10 MG/ML; UG/ML
8 INJECTION, SOLUTION INFILTRATION; PERINEURAL ONCE
Status: COMPLETED | OUTPATIENT
Start: 2020-06-18 | End: 2020-06-18

## 2020-06-18 RX ORDER — SODIUM BICARBONATE 42 MG/ML
5 INJECTION, SOLUTION INTRAVENOUS
Status: COMPLETED | OUTPATIENT
Start: 2020-06-18 | End: 2020-06-18

## 2020-06-18 RX ADMIN — SODIUM BICARBONATE 210 MG: 42 SOLUTION INTRAVENOUS at 14:05

## 2020-06-18 RX ADMIN — LIDOCAINE HYDROCHLORIDE 12 ML: 10 INJECTION, SOLUTION INFILTRATION; PERINEURAL at 14:05

## 2020-06-18 RX ADMIN — LIDOCAINE HYDROCHLORIDE AND EPINEPHRINE 80 MG: 10; 10 INJECTION, SOLUTION INFILTRATION; PERINEURAL at 14:08

## 2020-06-18 NOTE — PROGRESS NOTES
Pressure held to biopsy site for 5 minutes. No bleeding hematoma or oozing noted. Patient tolerated procedure well. Minimal bleeding noted post biopsy. Post biopsy discharge instructions reviewed with patient and patient given a copy. Ice pack applied over transparent dressing.

## 2020-06-18 NOTE — PROGRESS NOTES
Patient received for left breast stereotactic biopsy. Procedure explained to patient and daughter who accompanied her. Post biopsy discharge instructions reviewed with patient as well. Patient prefers we contact her via daughter with pathology results when they are in.

## 2020-06-24 NOTE — PROGRESS NOTES
Pathology results in and Dr Martin's office contacted with regarding results and need for surgical consult.

## 2020-06-24 NOTE — PROGRESS NOTES
Spoke with patient's daughter who is her caretaker. I arranged appointment with Dr Wolfgang White for surgical consult 6/29/2020 at 4:30pm.  Patient's daughter Cesar Blackwood aware of appointment date time and location. Pathology results faxed to Dr Martin's office.

## 2020-06-29 ENCOUNTER — OFFICE VISIT (OUTPATIENT)
Dept: SURGERY | Age: 85
End: 2020-06-29

## 2020-06-29 VITALS
WEIGHT: 162 LBS | HEART RATE: 78 BPM | TEMPERATURE: 98.3 F | BODY MASS INDEX: 28.7 KG/M2 | DIASTOLIC BLOOD PRESSURE: 72 MMHG | SYSTOLIC BLOOD PRESSURE: 144 MMHG | HEIGHT: 63 IN

## 2020-06-29 DIAGNOSIS — D05.12 DUCTAL CARCINOMA IN SITU (DCIS) OF LEFT BREAST: ICD-10-CM

## 2020-06-29 RX ORDER — ACETAMINOPHEN, DIPHENHYDRAMINE HCL, PHENYLEPHRINE HCL 325; 25; 5 MG/1; MG/1; MG/1
10 TABLET ORAL
COMMUNITY

## 2020-06-29 NOTE — LETTER
6/30/2020 9:32 AM 
 
Patient:  Janny Schmidt YOB: 1935 Date of Visit: 6/29/2020 Dear Dr. Sherin Durham: 
 
 
Thank you for referring Ms. Jocelyn Degroot to me for evaluation/treatment. Below are the relevant portions of my assessment and plan of care. If you have questions, please do not hesitate to call me. I look forward to following Ms. Macario Kenyon along with you.  
 
 
 
Sincerely, 
 
 
Ling Fernández MD

## 2020-06-29 NOTE — PROGRESS NOTES
HISTORY OF PRESENT ILLNESS Willis Lomeli is a 82 y.o. female. HPI   NEW patient presents for consultation at the request of Dr. Rosi Ureña for new diagnosis of LEFT breast DCIS. She is not feeling any breast lumps, but she has noticed since March that her LEFT breast has been tender and swollen. Per her daughter, her LEFT nipple may be slightly inverted. Has just recovered from a pretty widespread staph infection. 06/18/20: LEFT breast bx, 12:00-1:00. PATH: Microscopic foci of DCIS. ER was performed and is negative, 0%; however, this is based upon a very few cells and should repeated if additional material is available. FH - she is adopted so FH is unknown. BILATERAL screening mammogram 5/19/20, BIRADS 0, incomplete. [facility] Results (most recent): 
Results from Hospital Encounter encounter on 06/18/20 [facility] POST BX IMAGING LT INCL CAD Narrative INDICATION: Suspicious left breast microcalcifications TECHNIQUE: The risks and benefits of the procedure were discussed with the 
patient. Written consent was obtained. Preliminary mammographic imaging of the 
left breast demonstrates loosely grouped punctate/amorphous calcifications at 
the 12 to 1:00 position . The calcifications were targeted stereotactically 
using  a superior   approach. 1% lidocaine was injected intradermally. 1% 
lidocaine with epinephrine was injected locally into the breast. A small 
dermatotomy was made. A 9-gauge [employer] probe was then advanced into the 
breast. Following confirmation of accurate probe positioning, the probe was 
fired. 6 core specimens were then obtained at the target. Specimen radiograph 
demonstrates the targeted microcalcifications within the specimen. A biopsy clip 
was then deployed at the target. The probe was then removed from the breast. 
Pressure was applied locally, and the biopsy site was dressed appropriately. The 
patient tolerated the procedure well.  There were no immediate complications. Postprocedure CC and ML digital mammographic views of the left breast 
demonstrate accurate clip positioning. Impression IMPRESSION: Successful stereotactic vacuum assisted core needle biopsy of left 
breast microcalcifications. Biopsy clip is appropriately positioned. Pathology 
results are pending. Review of Systems Constitutional: Negative. HENT: Negative. Eyes: Negative. Respiratory: Negative. Cardiovascular: Negative. Gastrointestinal: Negative. Genitourinary: Negative. Musculoskeletal: Negative. Skin: Negative. Neurological: Negative. Endo/Heme/Allergies: Negative. Psychiatric/Behavioral: Positive for depression. Physical Exam 
 
ASSESSMENT and PLAN 
{ASSESSMENT/PLAN:[id number]}

## 2020-06-29 NOTE — PROGRESS NOTES
HISTORY OF PRESENT ILLNESS  Rosangela Saleem is a 82 y.o. female. HPI  NEW patient presents for consultation at the request of Dr. Lizzette Arboleda for new diagnosis of LEFT breast DCIS. She is not feeling any breast lumps, but she has noticed since March that her LEFT breast has been tender and swollen. Per her daughter, her LEFT nipple may be slightly inverted. Has just recovered from a pretty widespread staph infection. 06/18/20: LEFT breast bx, 12:00-1:00. PATH: Microscopic foci of DCIS. ER was performed and is negative, 0%; however, this is based upon a very few cells and should repeated if additional material is available. Clinical stage 0. FH - she is adopted so FH is unknown. BILATERAL screening mammogram 5/19/20, BIRADS 0, incomplete. [facility] Results (most recent):       Results from [place] encounter on 06/18/20   [facility] POST BX IMAGING LT INCL CAD     Narrative INDICATION: Suspicious left breast microcalcifications     TECHNIQUE: The risks and benefits of the procedure were discussed with the  patient. Written consent was obtained. Preliminary mammographic imaging of the  left breast demonstrates loosely grouped punctate/amorphous calcifications at  the 12 to 1:00 position . The calcifications were targeted stereotactically  using  a superior   approach. 1% lidocaine was injected intradermally. 1%  lidocaine with epinephrine was injected locally into the breast. A small  dermatotomy was made. A 9-gauge [employer] Eviva probe was then advanced into the  breast. Following confirmation of accurate probe positioning, the probe was  fired. 6 core specimens were then obtained at the target. Specimen radiograph  demonstrates the targeted microcalcifications within the specimen. A biopsy clip  was then deployed at the target. The probe was then removed from the breast.  Pressure was applied locally, and the biopsy site was dressed appropriately. The  patient tolerated the procedure well.  There were no immediate complications.     Postprocedure CC and ML digital mammographic views of the left breast  demonstrate accurate clip positioning.        Impression IMPRESSION: Successful stereotactic vacuum assisted core needle biopsy of left  breast microcalcifications. Biopsy clip is appropriately positioned. Pathology  results are pending.           Past Medical History:   Diagnosis Date    Anxiety     Depression     Hypercholesterolemia     Hypertension        Past Surgical History:   Procedure Laterality Date    HX APPENDECTOMY  1945    HX BREAST BIOPSY Bilateral 1980's    negative     HX HYSTERECTOMY  1978    HX KNEE REPLACEMENT Left 2015    HX TONSILLECTOMY  1944       Social History     Socioeconomic History    Marital status:      Spouse name: Not on file    Number of children: Not on file    Years of education: Not on file    Highest education level: Not on file   Occupational History    Not on file   Social Needs    Financial resource strain: Not on file    Food insecurity     Worry: Not on file     Inability: Not on file    Transportation needs     Medical: Not on file     Non-medical: Not on file   Tobacco Use    Smoking status: Never Smoker    Smokeless tobacco: Never Used   Substance and Sexual Activity    Alcohol use:  Yes     Alcohol/week: 7.0 - 14.0 standard drinks     Types: 7 - 14 Glasses of wine per week    Drug use: No    Sexual activity: Not Currently     Partners: Male   Lifestyle    Physical activity     Days per week: Not on file     Minutes per session: Not on file    Stress: Not on file   Relationships    Social connections     Talks on phone: Not on file     Gets together: Not on file     Attends [employer] service: Not on file     Active member of club or organization: Not on file     Attends meetings of clubs or organizations: Not on file     Relationship status: Not on file    Intimate partner violence     Fear of current or ex partner: Not on file Emotionally abused: Not on file     Physically abused: Not on file     Forced sexual activity: Not on file   Other Topics Concern    Not on file   Social History Narrative    Not on file       Current Outpatient Medications on File Prior to Visit   Medication Sig Dispense Refill    lisinopriL (PRINIVIL, ZESTRIL) 20 mg tablet TAKE 1 TABLET DAILY 90 Tab 1    simvastatin (ZOCOR) 40 mg tablet TAKE 1 TABLET NIGHTLY 90 Tab 1    donepeziL (ARICEPT) 10 mg tablet TAKE 1 TABLET BY MOUTH EVERY NIGHT 90 Tab 1    lisinopril (PRINIVIL, ZESTRIL) 20 mg tablet TAKE 1 TABLET BY MOUTH DAILY 90 Tab 1    DULoxetine (CYMBALTA) 60 mg capsule TAKE 1 CAPSULE DAILY 90 Cap 1    diclofenac EC (VOLTAREN) 75 mg EC tablet TAKE 1 TABLET TWICE A  Tab 1    Biotin 2,500 mcg cap Take  by mouth.  cyanocobalamin 1,000 mcg tablet Take 1,000 mcg by mouth daily.  multivitamin (ONE A DAY) tablet Take 1 Tab by mouth daily. No current facility-administered medications on file prior to visit. No Known Allergies    OB History    No obstetric history on file. ROS  Constitutional: Negative. HENT: Negative. Eyes: Negative. Respiratory: Negative. Cardiovascular: Negative. Gastrointestinal: Negative. Genitourinary: Negative. Musculoskeletal: Negative. Skin: Negative. Neurological: Negative. Endo/Heme/Allergies: Negative. Psychiatric/Behavioral: Positive for depression. Physical Exam  Exam conducted with a chaperone present. Cardiovascular:      Rate and Rhythm: Normal rate and regular rhythm. Heart sounds: Normal heart sounds. Pulmonary:      Breath sounds: Normal breath sounds. Chest:      Breasts: Breasts are symmetrical.         Right: Normal. No swelling, bleeding, inverted nipple, mass, nipple discharge, skin change or tenderness. Left: Inverted nipple and tenderness (bx site) present. No swelling, bleeding, mass, nipple discharge or skin change. Lymphadenopathy:      Cervical:      Right cervical: No superficial, deep or posterior cervical adenopathy. Left cervical: No superficial, deep or posterior cervical adenopathy. Upper Body:      Right upper body: No supraclavicular or axillary adenopathy. Left upper body: No supraclavicular or axillary adenopathy. BREAST ULTRASOUND, Pre-op Planning  Indication: Pre-op planning, LEFT breast  Technique: The area was scanned using a high-frequency linear-array near-field transducer  Findings: Bx site and clip at 12:00, 6cm from the nipple  Impression: Breast cancer  Disposition: Surgery    ASSESSMENT and PLAN    ICD-10-CM ICD-9-CM    1. Ductal carcinoma in situ (DCIS) of left breast D05.12 233.0       Pt presents for consultation for treatment of LEFT breast DCIS, ER-, clinical stage 0. Tender bx site at LEFT breast. LEFT nipple inversion; pt thinks this has always been inverted. LEFT breast US visualizes bx site and clip at 12:00, 6cm from the nipple. We had a long discussion of options for treatment. A total of 45 minutes were spent face-to-face with the patient, accompanied by her daughter, during this encounter, and over half of that time was spent on counseling and coordination of care. We discussed in depth the pathology results, and the need for surgery. The goals of treatment are to treat the breast, ensure that there is no invasive component, and to reduce risk of recurrence. Discussed treatment options with risks, complications, benefits, and limitations. Pt is a good candidate for lumpectomy, which will be under GA. Explained the importance of negative margins, and the need for re-excision in the case of positive margins. Discussed that in women over 79 with lumpectomy and without XRT, there is a slightly increased risk of recurrence, but that this does not affect overall cure rate. Except in the case of invasive cancer, pt will not likely need XRT.     The patient was counseled at length about the risks of felipa COVID-19 during their perioperative period and any recovery window from their procedure. The patient was made aware that felipa COVID-19 may worsen their prognosis for recovering from their procedure and lend to a higher morbidity and/or mortality risk. All material risks, benefits, and reasonable alternatives including postponing the procedure were discussed. The patient does wish to proceed with the procedure at this time. Pt understands and consents to surgery. Will schedule lumpectomy for the near future at [address] [place] [address], and scheduling will f/u with the date. This plan was reviewed with the patient and patient agrees. All questions were answered.     Written by Lisa Loyola, as dictated by Dr. Car Mackey MD.

## 2020-07-07 ENCOUNTER — HOSPITAL ENCOUNTER (OUTPATIENT)
Dept: PREADMISSION TESTING | Age: 85
Discharge: HOME | End: 2020-07-07
Payer: MEDICARE

## 2020-07-07 VITALS
BODY MASS INDEX: 28.47 KG/M2 | DIASTOLIC BLOOD PRESSURE: 84 MMHG | RESPIRATION RATE: 16 BRPM | HEIGHT: 63 IN | HEART RATE: 84 BPM | OXYGEN SATURATION: 98 % | SYSTOLIC BLOOD PRESSURE: 164 MMHG | WEIGHT: 160.7 LBS | TEMPERATURE: 98 F

## 2020-07-07 DIAGNOSIS — D05.12 DUCTAL CARCINOMA IN SITU OF LEFT BREAST: Primary | ICD-10-CM

## 2020-07-07 LAB
ANION GAP SERPL CALC-SCNC: 6 MMOL/L (ref 5–15)
BUN SERPL-MCNC: 21 MG/DL (ref 6–20)
BUN/CREAT SERPL: 24 (ref 12–20)
CALCIUM SERPL-MCNC: 9.3 MG/DL (ref 8.5–10.1)
CHLORIDE SERPL-SCNC: 109 MMOL/L (ref 97–108)
CO2 SERPL-SCNC: 25 MMOL/L (ref 21–32)
CREAT SERPL-MCNC: 0.88 MG/DL (ref 0.55–1.02)
GLUCOSE SERPL-MCNC: 105 MG/DL (ref 65–100)
POTASSIUM SERPL-SCNC: 4.5 MMOL/L (ref 3.5–5.1)
SODIUM SERPL-SCNC: 140 MMOL/L (ref 136–145)

## 2020-07-07 PROCEDURE — 93005 ELECTROCARDIOGRAM TRACING: CPT

## 2020-07-07 PROCEDURE — 80048 BASIC METABOLIC PNL TOTAL CA: CPT

## 2020-07-07 PROCEDURE — 36415 COLL VENOUS BLD VENIPUNCTURE: CPT

## 2020-07-07 RX ORDER — DICLOFENAC SODIUM 75 MG/1
75 TABLET, DELAYED RELEASE ORAL
COMMUNITY
End: 2020-09-22 | Stop reason: SDUPTHER

## 2020-07-07 NOTE — PERIOP NOTES
[address] [address] [facility]   PRE-ADMISSION TESTING    [phone]   Surgery Date:  7/14/2020 Tuesday      Is surgery arrival time given by surgeon? YES  If NO, [facility] staff will call you between 3 and 7pm the day before your surgery with your arrival time. (If your surgery is on a Monday, we will call you the Friday before.)    Call [phone] after 7pm Monday-Friday if you did not receive this call. INSTRUCTIONS BEFORE YOUR SURGERY   When You  Arrive Arrive at the [address] [facility] on the day of your surgery  Have your insurance card, photo ID, and any copayment (if needed)   Food   and   Drink NO food or drink after midnight the night before surgery    This means NO water, gum, mints, coffee, juice, etc.  No alcohol (beer, wine, liquor) 24 hours before and after surgery   Medications to   TAKE   Morning of Surgery MEDICATIONS TO TAKE THE MORNING OF SURGERY WITH A SIP OF WATER:    None   Medications  To  STOP      7 days before surgery  Non-Steroidal anti-inflammatory Drugs (NSAID's): for example, Ibuprofen (Advil, Motrin), Naproxen (Aleve)   Aspirin, if taking for pain    Herbal supplements, vitamins, and fish oil   Diclofenac  (Pain medications not listed above, including Tylenol may be taken)   Blood  Thinners  If you take  Aspirin, Plavix, Coumadin, or any blood-thinning or anti-blood clot medicine, talk to the doctor who prescribed the medications for pre-operative instructions. Bathing Clothing  Jewelry  Valuables      If you shower the morning of surgery, please do not apply anything to your skin (lotions, powders, deodorant, or makeup, especially mascara)   Follow Chlorhexidine Care Fusion body wash instructions provided to you during PAT appointment. Begin 3 days prior to surgery.    Do not shave or trim anywhere 24 hours before surgery   Wear your hair loose or down; no pony-tails, buns, or metal hair clips   Wear loose, comfortable, clean clothes   Wear glasses instead of contacts  [address], valuables, and jewelry, including body piercings, at home   Going Home - or Spending the Night  SAME-DAY SURGERY: You must have a responsible adult drive you home and stay with you 24 hours after surgery   ADMITS: If your doctor is keeping you in the hospital after surgery, leave personal belongings/luggage in your car until you have a hospital room number. Hospital discharge time is 12 noon  Drivers must be here before 12 noon unless you are told differently   Special Instructions It is now mandated that all surgical patients be tested for COVID-19 prior to surgery. Testing has to be exactly 4 days prior to surgery. Your COVID test date is July 10th between 8:00 am and 11:00 am.       COVID testing will be performed curbside at the [facility] [address]. There will be signs leading you to the testing site. You will need to bring a photo ID with you to be swabbed. Patients are advised to self-quarantine at home after testing and prior to your surgery date. You will be notified if your results are positive. What to watch for:   Coronavirus (COVID-19) affects different people in different ways   It also appears with a wide range of symptoms from mild to severe   Signs usually appear 2-14 days after exposure     If you develop any of the following, notify your doctor immediately:  o Fever  o Chills, with or without a shiver  o Muscle pain  o Headache  o Sore throat  o Dry cough  o New loss of taste or smell  o Tiredness      If you develop any of the following, call 911:  o Shortness of breath  o Difficulty breathing  o Chest pain  o New confusion  o Blueness of fingers and/or lips     Follow all instructions so your surgery wont be cancelled. Please, be on time. If a situation occurs and you are delayed the day of surgery, call  [phone].     If your physical condition changes (like a fever, cold, flu, etc.) call your surgeon. Home medication(s) reviewed and verified verbally during PAT appointment. The patient was contacted in person. The patient verbalizes understanding of all instructions and does not need reinforcement.

## 2020-07-08 DIAGNOSIS — F33.9 RECURRENT DEPRESSION (HCC): ICD-10-CM

## 2020-07-08 LAB
ATRIAL RATE: 77 BPM
CALCULATED P AXIS, ECG09: 7 DEGREES
CALCULATED R AXIS, ECG10: 16 DEGREES
CALCULATED T AXIS, ECG11: 69 DEGREES
DIAGNOSIS, 93000: NORMAL
P-R INTERVAL, ECG05: 152 MS
Q-T INTERVAL, ECG07: 380 MS
QRS DURATION, ECG06: 92 MS
QTC CALCULATION (BEZET), ECG08: 430 MS
VENTRICULAR RATE, ECG03: 77 BPM

## 2020-07-08 RX ORDER — DULOXETIN HYDROCHLORIDE 60 MG/1
CAPSULE, DELAYED RELEASE ORAL
Qty: 90 CAP | Refills: 1 | Status: SHIPPED | OUTPATIENT
Start: 2020-07-08 | End: 2020-09-22 | Stop reason: SDUPTHER

## 2020-07-10 ENCOUNTER — HOSPITAL ENCOUNTER (OUTPATIENT)
Dept: PREADMISSION TESTING | Age: 85
Discharge: HOME | End: 2020-07-10
Payer: MEDICARE

## 2020-07-10 PROCEDURE — 87635 SARS-COV-2 COVID-19 AMP PRB: CPT

## 2020-07-11 LAB — SARS-COV-2, COV2NT: NOT DETECTED

## 2020-07-13 ENCOUNTER — ANESTHESIA EVENT (OUTPATIENT)
Dept: SURGERY | Age: 85
End: 2020-07-13
Payer: MEDICARE

## 2020-07-14 ENCOUNTER — ANESTHESIA (OUTPATIENT)
Dept: SURGERY | Age: 85
End: 2020-07-14
Payer: MEDICARE

## 2020-07-14 ENCOUNTER — HOSPITAL ENCOUNTER (OUTPATIENT)
Age: 85
Setting detail: OUTPATIENT SURGERY
Discharge: HOME | End: 2020-07-14
Attending: SURGERY | Admitting: SURGERY
Payer: MEDICARE

## 2020-07-14 VITALS
SYSTOLIC BLOOD PRESSURE: 160 MMHG | TEMPERATURE: 97.6 F | HEIGHT: 63 IN | BODY MASS INDEX: 28.75 KG/M2 | HEART RATE: 80 BPM | RESPIRATION RATE: 16 BRPM | WEIGHT: 162.26 LBS | OXYGEN SATURATION: 95 % | DIASTOLIC BLOOD PRESSURE: 61 MMHG

## 2020-07-14 DIAGNOSIS — D05.12 DUCTAL CARCINOMA IN SITU OF LEFT BREAST: ICD-10-CM

## 2020-07-14 PROCEDURE — 77030020143 HC AIRWY LARYN INTUB CGAS -A: Performed by: ANESTHESIOLOGY

## 2020-07-14 PROCEDURE — 74011250636 HC RX REV CODE- 250/636: Performed by: ANESTHESIOLOGY

## 2020-07-14 PROCEDURE — 76210000050 HC AMBSU PH II REC 0.5 TO 1 HR: Performed by: SURGERY

## 2020-07-14 PROCEDURE — 77030011267 HC ELECTRD BLD COVD -A: Performed by: SURGERY

## 2020-07-14 PROCEDURE — 88360 TUMOR IMMUNOHISTOCHEM/MANUAL: CPT

## 2020-07-14 PROCEDURE — 77030018836 HC SOL IRR NACL ICUM -A: Performed by: SURGERY

## 2020-07-14 PROCEDURE — 74011250637 HC RX REV CODE- 250/637: Performed by: SURGERY

## 2020-07-14 PROCEDURE — 76060000061 HC AMB SURG ANES 0.5 TO 1 HR: Performed by: SURGERY

## 2020-07-14 PROCEDURE — 77030002933 HC SUT MCRYL J&J -A: Performed by: SURGERY

## 2020-07-14 PROCEDURE — 76030000000 HC AMB SURG OR TIME 0.5 TO 1: Performed by: SURGERY

## 2020-07-14 PROCEDURE — 74011000250 HC RX REV CODE- 250: Performed by: ANESTHESIOLOGY

## 2020-07-14 PROCEDURE — 74011000250 HC RX REV CODE- 250: Performed by: SURGERY

## 2020-07-14 PROCEDURE — 77030010507 HC ADH SKN DERMBND J&J -B: Performed by: SURGERY

## 2020-07-14 PROCEDURE — 77030031139 HC SUT VCRL2 J&J -A: Performed by: SURGERY

## 2020-07-14 PROCEDURE — 88307 TISSUE EXAM BY PATHOLOGIST: CPT

## 2020-07-14 PROCEDURE — 76210000034 HC AMBSU PH I REC 0.5 TO 1 HR: Performed by: SURGERY

## 2020-07-14 PROCEDURE — 77030040922 HC BLNKT HYPOTHRM STRY -A

## 2020-07-14 RX ORDER — HYDROMORPHONE HYDROCHLORIDE 1 MG/ML
.25-1 INJECTION, SOLUTION INTRAMUSCULAR; INTRAVENOUS; SUBCUTANEOUS
Status: DISCONTINUED | OUTPATIENT
Start: 2020-07-14 | End: 2020-07-14 | Stop reason: HOSPADM

## 2020-07-14 RX ORDER — HYDRALAZINE HYDROCHLORIDE 20 MG/ML
INJECTION INTRAMUSCULAR; INTRAVENOUS AS NEEDED
Status: DISCONTINUED | OUTPATIENT
Start: 2020-07-14 | End: 2020-07-14 | Stop reason: HOSPADM

## 2020-07-14 RX ORDER — FENTANYL CITRATE 50 UG/ML
INJECTION, SOLUTION INTRAMUSCULAR; INTRAVENOUS AS NEEDED
Status: DISCONTINUED | OUTPATIENT
Start: 2020-07-14 | End: 2020-07-14 | Stop reason: HOSPADM

## 2020-07-14 RX ORDER — MIDAZOLAM HYDROCHLORIDE 1 MG/ML
INJECTION, SOLUTION INTRAMUSCULAR; INTRAVENOUS AS NEEDED
Status: DISCONTINUED | OUTPATIENT
Start: 2020-07-14 | End: 2020-07-14 | Stop reason: HOSPADM

## 2020-07-14 RX ORDER — ONDANSETRON 2 MG/ML
INJECTION INTRAMUSCULAR; INTRAVENOUS AS NEEDED
Status: DISCONTINUED | OUTPATIENT
Start: 2020-07-14 | End: 2020-07-14 | Stop reason: HOSPADM

## 2020-07-14 RX ORDER — PROPOFOL 10 MG/ML
INJECTION, EMULSION INTRAVENOUS AS NEEDED
Status: DISCONTINUED | OUTPATIENT
Start: 2020-07-14 | End: 2020-07-14 | Stop reason: HOSPADM

## 2020-07-14 RX ORDER — BUPIVACAINE HYDROCHLORIDE AND EPINEPHRINE 5; 5 MG/ML; UG/ML
30 INJECTION, SOLUTION EPIDURAL; INTRACAUDAL; PERINEURAL ONCE
Status: DISCONTINUED | OUTPATIENT
Start: 2020-07-14 | End: 2020-07-14 | Stop reason: HOSPADM

## 2020-07-14 RX ORDER — LIDOCAINE HYDROCHLORIDE AND EPINEPHRINE 10; 10 MG/ML; UG/ML
30 INJECTION, SOLUTION INFILTRATION; PERINEURAL ONCE
Status: DISCONTINUED | OUTPATIENT
Start: 2020-07-14 | End: 2020-07-14 | Stop reason: HOSPADM

## 2020-07-14 RX ORDER — DEXAMETHASONE SODIUM PHOSPHATE 4 MG/ML
INJECTION, SOLUTION INTRA-ARTICULAR; INTRALESIONAL; INTRAMUSCULAR; INTRAVENOUS; SOFT TISSUE AS NEEDED
Status: DISCONTINUED | OUTPATIENT
Start: 2020-07-14 | End: 2020-07-14 | Stop reason: HOSPADM

## 2020-07-14 RX ORDER — LIDOCAINE HYDROCHLORIDE 10 MG/ML
0.1 INJECTION, SOLUTION EPIDURAL; INFILTRATION; INTRACAUDAL; PERINEURAL AS NEEDED
Status: DISCONTINUED | OUTPATIENT
Start: 2020-07-14 | End: 2020-07-14 | Stop reason: HOSPADM

## 2020-07-14 RX ORDER — LIDOCAINE HYDROCHLORIDE 20 MG/ML
INJECTION, SOLUTION EPIDURAL; INFILTRATION; INTRACAUDAL; PERINEURAL AS NEEDED
Status: DISCONTINUED | OUTPATIENT
Start: 2020-07-14 | End: 2020-07-14 | Stop reason: HOSPADM

## 2020-07-14 RX ORDER — SODIUM CHLORIDE, SODIUM LACTATE, POTASSIUM CHLORIDE, CALCIUM CHLORIDE 600; 310; 30; 20 MG/100ML; MG/100ML; MG/100ML; MG/100ML
100 INJECTION, SOLUTION INTRAVENOUS CONTINUOUS
Status: DISCONTINUED | OUTPATIENT
Start: 2020-07-14 | End: 2020-07-14 | Stop reason: HOSPADM

## 2020-07-14 RX ORDER — HYDROCODONE BITARTRATE AND ACETAMINOPHEN 5; 325 MG/1; MG/1
1 TABLET ORAL ONCE
Status: COMPLETED | OUTPATIENT
Start: 2020-07-14 | End: 2020-07-14

## 2020-07-14 RX ORDER — HYDROCODONE BITARTRATE AND ACETAMINOPHEN 5; 325 MG/1; MG/1
1 TABLET ORAL
Qty: 20 TAB | Refills: 0 | Status: SHIPPED | OUTPATIENT
Start: 2020-07-14 | End: 2020-07-21

## 2020-07-14 RX ORDER — BUPIVACAINE HYDROCHLORIDE AND EPINEPHRINE 5; 5 MG/ML; UG/ML
INJECTION, SOLUTION EPIDURAL; INTRACAUDAL; PERINEURAL AS NEEDED
Status: DISCONTINUED | OUTPATIENT
Start: 2020-07-14 | End: 2020-07-14 | Stop reason: HOSPADM

## 2020-07-14 RX ORDER — LIDOCAINE HYDROCHLORIDE AND EPINEPHRINE 10; 10 MG/ML; UG/ML
INJECTION, SOLUTION INFILTRATION; PERINEURAL AS NEEDED
Status: DISCONTINUED | OUTPATIENT
Start: 2020-07-14 | End: 2020-07-14 | Stop reason: HOSPADM

## 2020-07-14 RX ADMIN — SODIUM CHLORIDE, SODIUM LACTATE, POTASSIUM CHLORIDE, AND CALCIUM CHLORIDE 100 ML/HR: 600; 310; 30; 20 INJECTION, SOLUTION INTRAVENOUS at 08:11

## 2020-07-14 RX ADMIN — HYDROCODONE BITARTRATE AND ACETAMINOPHEN 1 TABLET: 5; 325 TABLET ORAL at 10:39

## 2020-07-14 RX ADMIN — MIDAZOLAM HYDROCHLORIDE 2 MG: 2 INJECTION, SOLUTION INTRAMUSCULAR; INTRAVENOUS at 09:01

## 2020-07-14 RX ADMIN — FENTANYL CITRATE 50 MCG: 0.05 INJECTION, SOLUTION INTRAMUSCULAR; INTRAVENOUS at 09:06

## 2020-07-14 RX ADMIN — ONDANSETRON HYDROCHLORIDE 4 MG: 2 SOLUTION INTRAMUSCULAR; INTRAVENOUS at 09:15

## 2020-07-14 RX ADMIN — FENTANYL CITRATE 25 MCG: 0.05 INJECTION, SOLUTION INTRAMUSCULAR; INTRAVENOUS at 09:15

## 2020-07-14 RX ADMIN — PROPOFOL 30 MG: 10 INJECTION, EMULSION INTRAVENOUS at 09:21

## 2020-07-14 RX ADMIN — SODIUM CHLORIDE, SODIUM LACTATE, POTASSIUM CHLORIDE, AND CALCIUM CHLORIDE 100 ML/HR: 600; 310; 30; 20 INJECTION, SOLUTION INTRAVENOUS at 10:05

## 2020-07-14 RX ADMIN — DEXAMETHASONE SODIUM PHOSPHATE 4 MG: 4 INJECTION, SOLUTION INTRAMUSCULAR; INTRAVENOUS at 09:15

## 2020-07-14 RX ADMIN — FENTANYL CITRATE 25 MCG: 0.05 INJECTION, SOLUTION INTRAMUSCULAR; INTRAVENOUS at 09:21

## 2020-07-14 RX ADMIN — PROPOFOL 150 MG: 10 INJECTION, EMULSION INTRAVENOUS at 09:06

## 2020-07-14 RX ADMIN — LIDOCAINE HYDROCHLORIDE 40 MG: 20 INJECTION, SOLUTION INTRAVENOUS at 09:06

## 2020-07-14 RX ADMIN — HYDRALAZINE HYDROCHLORIDE 10 MG: 20 INJECTION, SOLUTION INTRAMUSCULAR; INTRAVENOUS at 09:38

## 2020-07-14 RX ADMIN — HYDROMORPHONE HYDROCHLORIDE 0.25 MG: 1 INJECTION, SOLUTION INTRAMUSCULAR; INTRAVENOUS; SUBCUTANEOUS at 10:03

## 2020-07-14 NOTE — ANESTHESIA PREPROCEDURE EVALUATION
Relevant Problems   No relevant active problems       Anesthetic History   No history of anesthetic complications            Review of Systems / Medical History  Patient summary reviewed, nursing notes reviewed and pertinent labs reviewed    Pulmonary          Shortness of breath      Comments: Associated with anxiety   Neuro/Psych         Psychiatric history    Comments: Anxiety/depression Cardiovascular    Hypertension          Hyperlipidemia    Exercise tolerance: >4 METS     GI/Hepatic/Renal  Within defined limits              Endo/Other        Arthritis and cancer    Comments: Left breast cancer Other Findings              Physical Exam    Airway  Mallampati: II    Neck ROM: normal range of motion   Mouth opening: Normal     Cardiovascular    Rhythm: regular  Rate: normal         Dental    Dentition: Caps/crowns     Pulmonary  Breath sounds clear to auscultation               Abdominal         Other Findings            Anesthetic Plan    ASA: 2  Anesthesia type: general            Anesthetic plan and risks discussed with: Patient and Son / Daughter      Informed consent obtained.

## 2020-07-14 NOTE — H&P
HISTORY OF PRESENT ILLNESS  Lois Nolan is a 82 y.o. female. HPI  NEW patient presents for consultation at the request of Dr. Frank Martin for new diagnosis of LEFT breast DCIS. Nehal Constantino is not feeling any breast lumps, but she has noticed since March that her LEFT breast has been tender and swollen.   Per her daughter, her LEFT nipple may be slightly inverted.  Has just recovered from a pretty widespread staph infection.     06/18/20: LEFT breast bx, 12:00-1:00. PATH: Microscopic foci of DCIS. ER was performed and is negative, 0%; however, this is based upon a very few cells and should repeated if additional material is available. Clinical stage 0.     FH - she is adopted so FH is unknown.     BILATERAL screening mammogram 5/19/20, BIRADS 0, incomplete.       [facility] Results (most recent):          Results from [place] encounter on 06/18/20   [facility] POST BX IMAGING LT INCL CAD     Narrative INDICATION: Suspicious left breast microcalcifications     TECHNIQUE: The risks and benefits of the procedure were discussed with the  patient. Written consent was obtained. Preliminary mammographic imaging of the  left breast demonstrates loosely grouped punctate/amorphous calcifications at  the 12 to 1:00 position . The calcifications were targeted stereotactically  using  a superior   approach. 1% lidocaine was injected intradermally. 1%  lidocaine with epinephrine was injected locally into the breast. A small  dermatotomy was made. A 9-gauge MiniMonos Eviva probe was then advanced into the  breast. Following confirmation of accurate probe positioning, the probe was  fired. 6 core specimens were then obtained at the target. Specimen radiograph  demonstrates the targeted microcalcifications within the specimen. A biopsy clip  was then deployed at the target. The probe was then removed from the breast.  Pressure was applied locally, and the biopsy site was dressed appropriately. The  patient tolerated the procedure well. There were no immediate complications.     Postprocedure CC and ML digital mammographic views of the left breast  demonstrate accurate clip positioning.        Impression IMPRESSION: Successful stereotactic vacuum assisted core needle biopsy of left  breast microcalcifications. Biopsy clip is appropriately positioned. Pathology  results are pending.                 Past Medical History:   Diagnosis Date    Anxiety      Depression      Hypercholesterolemia      Hypertension                Past Surgical History:   Procedure Laterality Date    HX APPENDECTOMY   1945    HX BREAST BIOPSY Bilateral 1980's     negative     HX HYSTERECTOMY   1978    HX KNEE REPLACEMENT Left 2015    HX TONSILLECTOMY   1944        Social History            Socioeconomic History    Marital status:        Spouse name: Not on file    Number of children: Not on file    Years of education: Not on file    Highest education level: Not on file   Occupational History    Not on file   Social Needs    Financial resource strain: Not on file    Food insecurity       Worry: Not on file       Inability: Not on file    Transportation needs       Medical: Not on file       Non-medical: Not on file   Tobacco Use    Smoking status: Never Smoker    Smokeless tobacco: Never Used   Substance and Sexual Activity    Alcohol use:  Yes       Alcohol/week: 7.0 - 14.0 standard drinks       Types: 7 - 14 Glasses of wine per week    Drug use: No    Sexual activity: Not Currently       Partners: Male   Lifestyle    Physical activity       Days per week: Not on file       Minutes per session: Not on file    Stress: Not on file   Relationships    Social connections       Talks on phone: Not on file       Gets together: Not on file       Attends Taoist service: Not on file       Active member of club or organization: Not on file       Attends meetings of clubs or organizations: Not on file       Relationship status: Not on file    Intimate partner violence       Fear of current or ex partner: Not on file       Emotionally abused: Not on file       Physically abused: Not on file       Forced sexual activity: Not on file   Other Topics Concern    Not on file   Social History Narrative    Not on file               Current Outpatient Medications on File Prior to Visit   Medication Sig Dispense Refill    lisinopriL (PRINIVIL, ZESTRIL) 20 mg tablet TAKE 1 TABLET DAILY 90 Tab 1    simvastatin (ZOCOR) 40 mg tablet TAKE 1 TABLET NIGHTLY 90 Tab 1    donepeziL (ARICEPT) 10 mg tablet TAKE 1 TABLET BY MOUTH EVERY NIGHT 90 Tab 1    lisinopril (PRINIVIL, ZESTRIL) 20 mg tablet TAKE 1 TABLET BY MOUTH DAILY 90 Tab 1    DULoxetine (CYMBALTA) 60 mg capsule TAKE 1 CAPSULE DAILY 90 Cap 1    diclofenac EC (VOLTAREN) 75 mg EC tablet TAKE 1 TABLET TWICE A  Tab 1    Biotin 2,500 mcg cap Take  by mouth.        cyanocobalamin 1,000 mcg tablet Take 1,000 mcg by mouth daily.        multivitamin (ONE A DAY) tablet Take 1 Tab by mouth daily.          No current facility-administered medications on file prior to visit.         No Known Allergies     OB History    No obstetric history on file.           ROS  Constitutional: Negative.    HENT: Negative.    Eyes: Negative.    Respiratory: Negative.    Cardiovascular: Negative.    Gastrointestinal: Negative.    Genitourinary: Negative.    Musculoskeletal: Negative.    Skin: Negative.    Neurological: Negative.    Endo/Heme/Allergies: Negative.    Psychiatric/Behavioral: Positive for depression.         Physical Exam  Exam conducted with a chaperone present. Cardiovascular:      Rate and Rhythm: Normal rate and regular rhythm. Heart sounds: Normal heart sounds. Pulmonary:      Breath sounds: Normal breath sounds. Chest:      Breasts: Breasts are symmetrical.         Right: Normal. No swelling, bleeding, inverted nipple, mass, nipple discharge, skin change or tenderness.          Left: Inverted nipple and tenderness (bx site) present. No swelling, bleeding, mass, nipple discharge or skin change. Lymphadenopathy:      Cervical:      Right cervical: No superficial, deep or posterior cervical adenopathy. Left cervical: No superficial, deep or posterior cervical adenopathy. Upper Body:      Right upper body: No supraclavicular or axillary adenopathy. Left upper body: No supraclavicular or axillary adenopathy.       BREAST ULTRASOUND, Pre-op Planning  Indication: Pre-op planning, LEFT breast  Technique: The area was scanned using a high-frequency linear-array near-field transducer  Findings: Bx site and clip at 12:00, 6cm from the nipple  Impression: Breast cancer  Disposition: Surgery     ASSESSMENT and PLAN     ICD-10-CM ICD-9-CM     1. Ductal carcinoma in situ (DCIS) of left breast D05.12 233.0        Pt presents for consultation for treatment of LEFT breast DCIS, ER-, clinical stage 0. Tender bx site at LEFT breast. LEFT nipple inversion; pt thinks this has always been inverted. LEFT breast US visualizes bx site and clip at 12:00, 6cm from the nipple.     We had a long discussion of options for treatment. A total of 45 minutes were spent face-to-face with the patient, accompanied by her daughter, during this encounter, and over half of that time was spent on counseling and coordination of care. We discussed in depth the pathology results, and the need for surgery. The goals of treatment are to treat the breast, ensure that there is no invasive component, and to reduce risk of recurrence.     Discussed treatment options with risks, complications, benefits, and limitations. Pt is a good candidate for lumpectomy, which will be under GA. Explained the importance of negative margins, and the need for re-excision in the case of positive margins.  Discussed that in women over 79 with lumpectomy and without XRT, there is a slightly increased risk of recurrence, but that this does not affect overall cure rate. Except in the case of invasive cancer, pt will not likely need XRT.     The patient was counseled at length about the risks of felipa COVID-19 during their perioperative period and any recovery window from their procedure.  The patient was made aware that felipa COVID-19 may worsen their prognosis for recovering from their procedure and lend to a higher morbidity and/or mortality risk.  All material risks, benefits, and reasonable alternatives including postponing the procedure were discussed. The patient does wish to proceed with the procedure at this time.     Pt understands and consents to surgery. Will schedule lumpectomy for the near future at [facility], and scheduling will f/u with the date. This plan was reviewed with the patient and patient agrees. All questions were answered.

## 2020-07-14 NOTE — OP NOTES
[facility] Grewal [facility] 81  OPERATIVE REPORT    Name:  Arti Kang  MR#:  [id number]  :  1935  ACCOUNT #:  [de-identified]  DATE OF SERVICE:  2020    PREOPERATIVE DIAGNOSIS:  Ductal carcinoma in situ, left breast.    POSTOPERATIVE DIAGNOSIS:  Ductal carcinoma in situ, left breast.    PROCEDURE PERFORMED:  Left lumpectomy with intraoperative ultrasound. SURGEON:  Timbo Jones MD    ASSISTANT:  Staff. ANESTHESIA:  General.    COMPLICATIONS:  None. SPECIMENS REMOVED:  Left breast tissue. IMPLANTS:  None. ESTIMATED BLOOD LOSS:  Minimal.    INDICATION:  The patient is an 29-year-old female with biopsy-proven ductal carcinoma in situ. She is admitted for left lumpectomy. PROCEDURE:  After satisfactory induction of general LMA anesthesia, the patient was prepped and draped in sterile fashion. Intraoperative ultrasound was performed. The breast was marked. A curvilinear incision was made in the upper quadrant of the upper portion of the left breast and deepened through subcutaneous tissues with the Bovie cautery. Standard lumpectomy was then performed down to chest wall. Specimen was removed and a specimen ultrasound was performed which revealed the presence of the clip within the specimen. All dissection planes were made hemostatic and the wound was anesthetized with 0.5% Marcaine. Tissue advancement flaps were then created to close the resultant defect which was quite large over a distance of 7 x 3 cm inferiorly and 7 x 2 cm superiorly for a total tissue advancement of approximately 35 cm2. The deep parenchymal flaps were then reapproximated with interrupted 3-0 Vicryl suture. A separate incision was made anteriorly in both superior and inferior flaps and the second area of tissue was rotated into the wound and secured with interrupted 3-0 Vicryl.   The subcutaneous tissue was reapproximated with interrupted 3-0 Vicryl and the skin closed with running subcuticular 4-0 Monocryl. The patient tolerated the procedure well without any complications. She was taken to the recovery room in stable condition.       Sarahi Chan MD      [id number]  D:  07/14/2020 9:51  T:  07/14/2020 11:11  JOB #:  [id number]  CC:  Shalini Marie MD

## 2020-07-14 NOTE — ANESTHESIA POSTPROCEDURE EVALUATION
Procedure(s):  LEFT BREAST LUMPECTOMY WITH ULTRASOUND. general    Anesthesia Post Evaluation      Multimodal analgesia: multimodal analgesia not used between 6 hours prior to anesthesia start to PACU discharge  Patient location during evaluation: PACU  Patient participation: complete - patient participated  Level of consciousness: awake and alert  Pain score: 3  Pain management: adequate  Airway patency: patent  Anesthetic complications: no  Cardiovascular status: hemodynamically stable and acceptable  Respiratory status: acceptable  Hydration status: acceptable  Comments: Patient seen and evaluated; no concerns. Post anesthesia nausea and vomiting:  none      INITIAL Post-op Vital signs:   Vitals Value Taken Time   /54 7/14/2020 10:30 AM   Temp 36.4 °C (97.6 °F) 7/14/2020  9:59 AM   Pulse 81 7/14/2020 10:33 AM   Resp 27 7/14/2020 10:33 AM   SpO2 92 % 7/14/2020 10:33 AM   Vitals shown include unvalidated device data.

## 2020-07-14 NOTE — DISCHARGE INSTRUCTIONS
Discharge Instructions from Dr. Best Kendrick    · I will call you with the pathology results, typically within 1 week from today. · You may shower, but no hot tubs, swimming pools, or baths until your incision is healed. · No heavy lifting with the affected extremity (nothing greater than 5 pounds), and limit its use for the next 4-5 days. · You may use an ice pack for comfort for the next couple of days, but do not place ice directly on the skin. Rather, use a towel or clothing to serve as a barrier between skin and ice to prevent injury. · If I placed a drain, follow the drain instructions provided, especially as you keep a record of the drain output. · Follow medication instructions carefully. · Watch for signs of infection as listed below. · Redness  · Swelling  · Drainage from the incision or from your nipple that appears infected  · Fever over 101 degrees for consecutive readings, or over 99.5 if you are currently undergoing chemotherapy. · Call our office (number is below) for a follow-up appointment. · If you have any problems, our phone number is [phone]. DISCHARGE SUMMARY from your Nurse      PATIENT INSTRUCTIONS    After general anesthesia or intravenous sedation, for 24 hours or while taking prescription Narcotics:  · Limit your activities  · Do not drive and operate hazardous machinery  · Do not make important personal or business decisions  · Do  not drink alcoholic beverages  · If you have not urinated within 8 hours after discharge, please contact your surgeon on call.     Report the following to your surgeon:  · Excessive pain, swelling, redness or odor of or around the surgical area  · Temperature over 100.5  · Nausea and vomiting lasting longer than 4 hours or if unable to take medications  · Any signs of decreased circulation or nerve impairment to extremity: change in color, persistent  numbness, tingling, coldness or increase pain  · Any questions      GOOD HELP TO FIGHT AN INFECTION  Here are a few tip to help reduce the chance of getting an infection after surgery:   Wash Your Hands   Good handwashing is the most important thing you and your caregiver can do.  Wash before and after caring for any wounds. Dry your hand with a clean towel.  Wash with soap and water for at least 20 seconds. A TIP: sing the \"Happy Birthday\" song through one time while washing to help with the timing.  Use a hand  in between washings.  Shower   When your surgeon says it is OK to take a shower, use a new bar of antibacterial soap (if that is what you use, and keep that bar of soap ONLY for your use), or antibacterial body wash.  Use a clean wash cloth or sponge when you bathe.  Dry off with a clean towel  after every bath - be careful around any wounds, skin staples, sutures or surgical glue over/on wounds.  Do not enter swimming pools, hot tubs, lakes, rivers and/or ocean until wounds are healed and your doctor/surgeon says it is OK.  Use Clean Sheets   Sleep on freshly laundered sheets after your surgery.  Keep the surgery site covered with a clean, dry bandage (if instructed to do so). If the bandage becomes soiled, reapply a new, dry, clean bandage.  Do not allow pets to sleep with you while your wound is healing.  Lifestyle Modification and Controlling Your Blood Sugar   Smoking slows wound healing. Stop smoking and limit exposure to second-hand smoke.  High blood sugar slows wound healing. Eat a well-balanced diet to provide proper nutrition while healing   Monitor your blood sugar (if you are a diabetic) and take your medications as you are suppose to so you can control you blood sugar after surgery. COUGH AND DEEP BREATHE    Breathing deeply and coughing are very important exercises to do after surgery. Deep breathing and coughing open the little air tubes and air sacks in your lungs.        You take deep breaths every day. You may not even notice - it is just something you do when you sigh or yawn. It is a natural exercise you do to keep these air passages open. After surgery, take deep breaths and cough, on purpose. DIRECTIONS:  · Take 10 to 15 slow deep breaths every hour while awake. · Breathe in deeply, and hold it for 2 seconds. · Exhale slowly through puckered lips, like blowing up a balloon. · After every 4th or 5th deep breath, hug your pillow to your chest or belly and give a hard, deep cough. Yes, it will probably hurt. But doing this exercise is a very important part of healing after surgery. Take your pain medicine to help you do this exercise without too much pain. Coughing and deep breathing help prevent bronchitis and pneumonia after surgery. If you had chest or belly surgery, use a pillow as a \"hug tim\" and hold it tightly to your chest or belly when you cough. ANKLE PUMPS    Ankle pumps increase the circulation of oxygenated blood to your lower extremities and decrease your risk for circulation problems such as blood clots. They also stretch the muscles, tendons and ligaments in your foot and ankle, and prevent joint contracture in the ankle and foot, especially after surgeries on the legs. It is important to do ankle pump exercises regularly after surgery because immobility increases your risk for developing a blood clot. Your doctor may also have you take an Aspirin for the next few days as well. If your doctor did not ask you to take an Aspirin, consult with him before starting Aspirin therapy on your own. The exercise is quite simple. · Slowly point your foot forward, feeling the muscles on the top of your lower leg stretch, and hold this position for 5 seconds. · Next, pull your foot back toward you as far as possible, stretching the calf muscles, and hold that position for 5 seconds.                  · Repeat with the other foot.  · Perform 10 repetitions every hour while awake for both ankles if possible (down and then up with the foot once is one repetition). You should feel gentle stretching of the muscles in your lower leg when doing this exercise. If you feel pain, or your range of motion is limited, don't push too hard. Only go the limit your joint and muscles will let you go. If you have increasing pain, progressively worsening leg warmth or swelling, STOP the exercise and call your doctor. MEDICATION AND   SIDE EFFECT GUIDE    The [address] [place] [address] MEDICATION AND SIDE EFFECT GUIDE was provided to the PATIENT AND CARE PROVIDER. Information provided includes instruction about drug purpose and common side effects for the following medications:   · Hydrocodone-acetaminophen        These are general instructions for a healthy lifestyle:    *   Please give a list of your current medications to your Primary Care Provider. *   Please update this list whenever your medications are discontinued, doses are changed, or new medications (including over-the-counter products) are added. *   Please carry medication information at all times in case of emergency situations. About Smoking  No smoking / No tobacco products  Avoid exposure to second hand smoke     Surgeon General's Warning:  Quitting smoking now greatly reduces serious risk to your health. Obesity, smoking, and sedentary lifestyle greatly increases your risk for illness and disease. A healthy diet, regular physical exercise & weight monitoring are important for maintaining a healthy lifestyle. Congestive Heart Failure  You may be retaining fluid if you have a history of heart failure or if you experience any of the following symptoms:  Weight gain of 3 pounds or more overnight or 5 pounds in a week, increased swelling in your hands or feet or shortness of breath while lying flat in bed.   Please call your doctor as soon as you notice any of these symptoms; do not wait until your next office visit. Recognize signs and symptoms of STROKE:  F -  Face looks uneven  A -  Arms unable to move or move evenly  S -  Speech slurred or non-existent  T -  Time-call 911 as soon as signs and symptoms begin-DO NOT go          back to bed or wait to see if you get better-TIME IS BRAIN. Warning Signs of HEART ATTACK   Call 911 if you have these symptoms:     Chest discomfort. Most heart attacks involve discomfort in the center of the chest that lasts more than a few minutes, or that goes away and comes back. It can feel like uncomfortable pressure, squeezing, fullness, or pain.  Discomfort in other areas of the upper body. Symptoms can include pain or discomfort in one or both arms, the back, neck, jaw, or stomach.  Shortness of breath with or without chest discomfort.  Other signs may include breaking out in a cold sweat, nausea, or lightheadedness. Don't wait more than five minutes to call 911 - MINUTES MATTER! Fast action can save your life. Calling 911 is almost always the fastest way to get lifesaving treatment. Emergency Medical Services staff can begin treatment when they arrive -- up to an hour sooner than if someone gets to the hospital by car. Learning About Coronavirus [phone])  Coronavirus [phone]): Overview  What is coronavirus (COVID-19)? The coronavirus disease (COVID-19) is caused by a virus. It is an illness that was first found in [place], [place], in December 2019. It has since spread worldwide. The virus can cause fever, cough, and trouble breathing. In severe cases, it can cause pneumonia and make it hard to breathe without help. It can cause death. Coronaviruses are a large group of viruses. They cause the common cold. They also cause more serious illnesses like Middle East respiratory syndrome (MERS) and severe acute respiratory syndrome (SARS). COVID-19 is caused by a novel coronavirus.  That means it's a new type that has not been seen in people before. This virus spreads person-to-person through droplets from coughing and sneezing. It can also spread when you are close to someone who is infected. And it can spread when you touch something that has the virus on it, such as a doorknob or a tabletop. What can you do to protect yourself from coronavirus (COVID-19)? The best way to protect yourself from getting sick is to:  · Avoid areas where there is an outbreak. · Avoid contact with people who may be infected. · Wash your hands often with soap or alcohol-based hand sanitizers. · Avoid crowds and try to stay at least 6 feet away from other people. · Wash your hands often, especially after you cough or sneeze. Use soap and water, and scrub for at least 20 seconds. If soap and water aren't available, use an alcohol-based hand . · Avoid touching your mouth, nose, and eyes. What can you do to avoid spreading the virus to others? To help avoid spreading the virus to others:  · Cover your mouth with a tissue when you cough or sneeze. Then throw the tissue in the trash. · Use a disinfectant to clean things that you touch often. · Stay home if you are sick or have been exposed to the virus. Don't go to school, work, or public areas. And don't use public transportation. · If you are sick:  ? Leave your home only if you need to get medical care. But call the doctor's office first so they know you're coming. And wear a face mask, if you have one.  ? If you have a face mask, wear it whenever you're around other people. It can help stop the spread of the virus when you cough or sneeze. ? Clean and disinfect your home every day. Use household  and disinfectant wipes or sprays. Take special care to clean things that you grab with your hands. These include doorknobs, remote controls, phones, and handles on your refrigerator and microwave. And don't forget countertops, tabletops, bathrooms, and computer keyboards.   When to call for help  Call 911 anytime you think you may need emergency care. For example, call if:  · You have severe trouble breathing. (You can't talk at all.)  · You have constant chest pain or pressure. · You are severely dizzy or lightheaded. · You are confused or can't think clearly. · Your face and lips have a blue color. · You pass out (lose consciousness) or are very hard to wake up. Call your doctor now if you develop symptoms such as:  · Shortness of breath. · Fever. · Cough. If you need to get care, call ahead to the doctor's office for instructions before you go. Make sure you wear a face mask, if you have one, to prevent exposing other people to the virus. Where can you get the latest information? The following health organizations are tracking and studying this virus. Their websites contain the most up-to-date information. Stacey German also learn what to do if you think you may have been exposed to the virus. · U.S. Centers for Disease Control and Prevention (CDC): The CDC provides updated news about the disease and travel advice. The website also tells you how to prevent the spread of infection. www.cdc.gov  · World Health Organization [facility]): WHO offers information about the virus outbreaks. WHO also has travel advice. www.who.int  Current as of: April 1, 2020               Content Version: 12.4  © 6682-5063 [employer]. Care instructions adapted under license by your healthcare professional. If you have questions about a medical condition or this instruction, always ask your healthcare professional. [place] [address] any warranty or liability for your use of this information. The discharge information has been reviewed with the patient and caregiver. Any questions and concerns from the patient and caregiver have been addressed. The patient and caregiver verbalized understanding.         Other information in your discharge envelope:  []     PRESCRIPTIONS  [] PHYSICAL THERAPY PRESCRIPTION  []     APPOINTMENT CARDS  []     Regional Anesthesia Pamphlet for block or block with On-Q Catheter from   Anesthesia Service  []     Medical device information sheets/pamphlets from their    []     School/work excuse note. []     /parent work excuse note. The following personal items collected during your admission are returned to you:   Dental Appliance: Dental Appliances: None  Vision:    Hearing Aid:    Jewelry: Jewelry: None  Clothing: Clothing: Undergarments, Footwear, Pants, Shirt  Other Valuables:  Other Valuables: None  Valuables sent to safe:

## 2020-07-21 NOTE — PROGRESS NOTES
Called patient with surg path  dcis 3.4 cm   Margins clear  Microinvasion  Spoke with daughter  Yayo Schaeffer needs any node sampling given age and microinvasion  She stated her mom doing well. Will defer to dr. Verito Alcantara for follow-up and referrals.

## 2020-07-27 ENCOUNTER — VIRTUAL VISIT (OUTPATIENT)
Dept: SURGERY | Age: 85
End: 2020-07-27

## 2020-07-27 DIAGNOSIS — D05.12 DUCTAL CARCINOMA IN SITU OF LEFT BREAST: Primary | ICD-10-CM

## 2020-07-27 DIAGNOSIS — D05.12 DUCTAL CARCINOMA IN SITU (DCIS) OF LEFT BREAST: ICD-10-CM

## 2020-07-27 NOTE — PROGRESS NOTES
HISTORY OF PRESENT ILLNESS  Beverley Rice is a 82 y.o. female. HPI  Patient presents for f/u s/p LEFT breast lumpectomy on 07/14/20, and is doing well overall. No complaints. 06/18/20: LEFT breast bx, 12:00-1:00. PATH: Microscopic foci of DCIS. ER was performed and is negative, 0%; however, this is based upon a very few cells and should repeated if additional material is available. Clinical stage 0.    07/14/20: LEFT breast lumpectomy. PATH: DCIS with microinvasion, involved a 3.4cm area, negative margins, no LNs submitted or found. Pathologic stage: pT1mi, pNX. Past Medical History:   Diagnosis Date    Anxiety     Arthritis     Cancer ([facility] [address] 06/2020    LEFT breast DCIS    Depression     Hypercholesterolemia     Hypertension     Mild cognitive impairment with memory loss        Past Surgical History:   Procedure Laterality Date    HX APPENDECTOMY  1945    HX BREAST BIOPSY Bilateral 1980's, 2020 1980s negative; 2020 positive left    HX BREAST LUMPECTOMY Left 7/14/2020    LEFT BREAST LUMPECTOMY WITH ULTRASOUND performed by Ludmila Guaman MD at [address] HX HYSTERECTOMY  1978    total    HX KNEE REPLACEMENT Left 2015    HX TONSILLECTOMY  1944       Social History     Socioeconomic History    Marital status:      Spouse name: Not on file    Number of children: Not on file    Years of education: Not on file    Highest education level: Not on file   Occupational History    Not on file   Social Needs    Financial resource strain: Not on file    Food insecurity     Worry: Not on file     Inability: Not on file    Transportation needs     Medical: Not on file     Non-medical: Not on file   Tobacco Use    Smoking status: Former Smoker     Packs/day: 0.25     Years: 3.00     Pack years: 0.75    Smokeless tobacco: Never Used   Substance and Sexual Activity    Alcohol use:  Yes     Alcohol/week: 7.0 - 14.0 standard drinks     Types: 7 - 14 Glasses of wine per week  Drug use: No    Sexual activity: Not Currently     Partners: Male   Lifestyle    Physical activity     Days per week: Not on file     Minutes per session: Not on file    Stress: Not on file   Relationships    Social connections     Talks on phone: Not on file     Gets together: Not on file     Attends [employer] service: Not on file     Active member of club or organization: Not on file     Attends meetings of clubs or organizations: Not on file     Relationship status: Not on file    Intimate partner violence     Fear of current or ex partner: Not on file     Emotionally abused: Not on file     Physically abused: Not on file     Forced sexual activity: Not on file   Other Topics Concern    Not on file   Social History Narrative    Not on file       Current Outpatient Medications on File Prior to Visit   Medication Sig Dispense Refill    DULoxetine (CYMBALTA) 60 mg capsule TAKE 1 CAPSULE DAILY 90 Cap 1    diclofenac EC (VOLTAREN) 75 mg EC tablet Take 75 mg by mouth nightly.  melatonin 10 mg tab Take 10 mg by mouth nightly.  simvastatin (ZOCOR) 40 mg tablet TAKE 1 TABLET NIGHTLY 90 Tab 1    lisinopril (PRINIVIL, ZESTRIL) 20 mg tablet TAKE 1 TABLET BY MOUTH DAILY 90 Tab 1    BIOTIN PO Take 1 Cap by mouth daily.  CYANOCOBALAMIN, VITAMIN B-12, PO Take 1 Cap by mouth daily.  multivitamin (ONE A DAY) tablet Take 1 Tab by mouth daily. No current facility-administered medications on file prior to visit. No Known Allergies    OB History    No obstetric history on file. Obstetric Comments   Menarche 17, LMP 0, # of children 1, age of 4st delivery 27, Hysterectomy/oophorectomy Yes/Yes, Breast bx Yes, history of breast feeding No, BCP Yes, in the past, Hormone therapy No               ROS    Physical Exam  ASSESSMENT and PLAN    ICD-10-CM ICD-9-CM    1.  Ductal carcinoma in situ (DCIS) of left breast  D05.12 233.0       Patient presents for f/u s/p LEFT breast lumpectomy on 07/14/20, and is doing well overall. No complaints    F/U  6 months. This plan was reviewed with the patient and patient agrees. All questions were answered. George Heller is a 82 y.o. female who was evaluated by an audio only encounter for concerns as above. Patient identification was verified prior to start of the visit. A caregiver was present when appropriate. Due to this being a TeleHealth encounter (During [facility]-28 public health emergency), evaluation of the following organ systems was limited: Vitals/Constitutional/EENT/Resp/CV/GI//MS/Neuro/Skin/Heme-Lymph-Imm. Pursuant to the emergency declaration under the [facility], 1135 waiver authority and the [employer] and [employer] Act, this Virtual Visit was conducted, with patient's (and/or legal guardian's) consent, to reduce the patient's risk of exposure to COVID-19 and provide necessary medical care. Services were provided through a synchronous discussion virtually to substitute for in-person clinic visit. I was in the office. The patient was at home. --Jasmyne Juarez MD, [id number]/[id number]

## 2020-08-25 ENCOUNTER — DOCUMENTATION ONLY (OUTPATIENT)
Dept: ONCOLOGY | Age: 85
End: 2020-08-25

## 2020-08-25 ENCOUNTER — OFFICE VISIT (OUTPATIENT)
Dept: ONCOLOGY | Age: 85
End: 2020-08-25
Payer: MEDICARE

## 2020-08-25 VITALS
BODY MASS INDEX: 28.93 KG/M2 | WEIGHT: 163.3 LBS | TEMPERATURE: 98.7 F | DIASTOLIC BLOOD PRESSURE: 71 MMHG | OXYGEN SATURATION: 98 % | HEIGHT: 63 IN | SYSTOLIC BLOOD PRESSURE: 145 MMHG | RESPIRATION RATE: 18 BRPM | HEART RATE: 67 BPM

## 2020-08-25 DIAGNOSIS — C50.912 MALIGNANT NEOPLASM OF LEFT BREAST IN FEMALE, ESTROGEN RECEPTOR NEGATIVE, UNSPECIFIED SITE OF BREAST (HCC): Primary | ICD-10-CM

## 2020-08-25 DIAGNOSIS — Z17.1 MALIGNANT NEOPLASM OF LEFT BREAST IN FEMALE, ESTROGEN RECEPTOR NEGATIVE, UNSPECIFIED SITE OF BREAST (HCC): Primary | ICD-10-CM

## 2020-08-25 PROCEDURE — 1090F PRES/ABSN URINE INCON ASSESS: CPT | Performed by: INTERNAL MEDICINE

## 2020-08-25 PROCEDURE — G8754 DIAS BP LESS 90: HCPCS | Performed by: INTERNAL MEDICINE

## 2020-08-25 PROCEDURE — 1101F PT FALLS ASSESS-DOCD LE1/YR: CPT | Performed by: INTERNAL MEDICINE

## 2020-08-25 PROCEDURE — G8427 DOCREV CUR MEDS BY ELIG CLIN: HCPCS | Performed by: INTERNAL MEDICINE

## 2020-08-25 PROCEDURE — G9717 DOC PT DX DEP/BP F/U NT REQ: HCPCS | Performed by: INTERNAL MEDICINE

## 2020-08-25 PROCEDURE — G0463 HOSPITAL OUTPT CLINIC VISIT: HCPCS | Performed by: INTERNAL MEDICINE

## 2020-08-25 PROCEDURE — G8419 CALC BMI OUT NRM PARAM NOF/U: HCPCS | Performed by: INTERNAL MEDICINE

## 2020-08-25 PROCEDURE — G8753 SYS BP > OR = 140: HCPCS | Performed by: INTERNAL MEDICINE

## 2020-08-25 PROCEDURE — 99205 OFFICE O/P NEW HI 60 MIN: CPT | Performed by: INTERNAL MEDICINE

## 2020-08-25 PROCEDURE — G8399 PT W/DXA RESULTS DOCUMENT: HCPCS | Performed by: INTERNAL MEDICINE

## 2020-08-25 PROCEDURE — G8536 NO DOC ELDER MAL SCRN: HCPCS | Performed by: INTERNAL MEDICINE

## 2020-08-25 NOTE — PROGRESS NOTES
[facility] at [address] [facility]  Apoorva Kong: [phone]  F: [phone]      Reason for Visit:   Beverley Rice is a 82 y.o. female who is seen in consultation at the request of Dr. Huma Fam for evaluation of therapy for breast cancer    Treatment History:   · 6/18/20 breast left 12:00-1:00 core bx:  Microscopic foci DCIS, ER negative  · 7/14/20 left breast lumpectomy:  DCIS with microinvasion, DCIS is 3.4 cm, gr 3, ER negative, MA negative, insufficient invasive cancer for receptors, pT1mi pNx cM0    History of Present Illness:   Noticed in march 2020 that her L breast was been tender and swollen and with L nipple inversion, leading to the pathology above      FH: Adopted, FH unknown    Past Medical History:   Diagnosis Date    Anxiety     Arthritis     Cancer ([facility] [address] 06/2020    LEFT breast DCIS    Depression     Hypercholesterolemia     Hypertension     Mild cognitive impairment with memory loss       Past Surgical History:   Procedure Laterality Date    HX APPENDECTOMY  1945    HX BREAST BIOPSY Bilateral 1980's, 2020 1980s negative; 2020 positive left    HX BREAST LUMPECTOMY Left 7/14/2020    LEFT BREAST LUMPECTOMY WITH ULTRASOUND performed by Ludmila Guaman MD at [facility]    HX HYSTERECTOMY  1978    total    HX KNEE REPLACEMENT Left 2015    HX TONSILLECTOMY  1944      Social History     Tobacco Use    Smoking status: Former Smoker     Packs/day: 0.25     Years: 3.00     Pack years: 0.75    Smokeless tobacco: Never Used   Substance Use Topics    Alcohol use: Yes     Alcohol/week: 7.0 - 14.0 standard drinks     Types: 7 - 14 Glasses of wine per week      Family History   Adopted: Yes     Current Outpatient Medications   Medication Sig    DULoxetine (CYMBALTA) 60 mg capsule TAKE 1 CAPSULE DAILY    diclofenac EC (VOLTAREN) 75 mg EC tablet Take 75 mg by mouth nightly.     melatonin 10 mg tab Take 10 mg by mouth nightly.  simvastatin (ZOCOR) 40 mg tablet TAKE 1 TABLET NIGHTLY    lisinopril (PRINIVIL, ZESTRIL) 20 mg tablet TAKE 1 TABLET BY MOUTH DAILY    BIOTIN PO Take 1 Cap by mouth daily.  CYANOCOBALAMIN, VITAMIN B-12, PO Take 1 Cap by mouth daily.  multivitamin (ONE A DAY) tablet Take 1 Tab by mouth daily. No current facility-administered medications for this visit. No Known Allergies     Review of Systems: A complete review of systems was obtained, negative except as described above. Physical Exam:   There were no vitals taken for this visit. ECOG PS: 0    Constitutional: Appears well-developed and well-nourished in no apparent distress      Mental status: Alert and awake, Oriented to person/place/time, Able to follow commands    Eyes: EOM normal, Sclera normal, No visible ocular discharge    HENT: Normocephalic, atraumatic    Mouth/Throat: Moist mucous membranes    External Ears normal    Neck: No visualized mass    Pulmonary/Chest: Respiratory effort normal, No visualized signs of difficulty breathing or respiratory distress    Musculoskeletal: Normal gait with no signs of ataxia, Normal range of motion of neck    Neurological: No facial asymmetry (Cranial nerve 7 motor function), No gaze palsy    Skin: No significant exanthematous lesions or discoloration noted on facial skin    Psychiatric: Normal affect, No hallucinations             Results:     Lab Results   Component Value Date/Time    WBC 4.7 09/18/2019 12:17 PM    HGB 12.6 09/18/2019 12:17 PM    HCT 37.6 09/18/2019 12:17 PM    PLATELET 102 76/08/2192 12:17 PM    MCV 91 09/18/2019 12:17 PM    ABS.  NEUTROPHILS 2.9 09/18/2019 12:17 PM     Lab Results   Component Value Date/Time    Sodium 140 07/07/2020 02:22 PM    Potassium 4.5 07/07/2020 02:22 PM    Chloride 109 (H) 07/07/2020 02:22 PM    CO2 25 07/07/2020 02:22 PM    Glucose 105 (H) 07/07/2020 02:22 PM    BUN 21 (H) 07/07/2020 02:22 PM    Creatinine 0.88 07/07/2020 02:22 PM    GFR est AA >60 07/07/2020 02:22 PM    GFR est non-AA >60 07/07/2020 02:22 PM    Calcium 9.3 07/07/2020 02:22 PM     Lab Results   Component Value Date/Time    Bilirubin, total 0.4 03/04/2019 12:27 PM    ALT (SGPT) 19 03/04/2019 12:27 PM    Alk. phosphatase 63 03/04/2019 12:27 PM    Protein, total 7.1 03/04/2019 12:27 PM    Albumin 4.7 03/04/2019 12:27 PM       5/19/20 screening mammogram  FINDINGS: Bilateral digital screening mammography was performed and is  interpreted in conjunction with a computer assisted detection (CAD) system. In the right breast, no suspicious masses or calcifications are identified. In the left central to slightly superior breast at middle depth there are indeterminate Calcifications. Records reviewed and summarized above. Pathology report(s) reviewed above. Radiology report(s) reviewed above. Assessment/plan:   1. Left breast DCIS with microinvasion:  Stage IA, both anatomic and prognostic; DCIS is ER and ID negative    No indication for chemo or endocrine therapy. No indication for SLN biopsy at her age and stage    Using [email or web address]. org, her 5 year all cause mortality is 20%; her 10 year all cause mortality risk is 52-58%; her median OS is 9-10 years. Discussed XRT. There is unlikely any overall survival benefit for XRT, but if her disease is triple negative (and it is quite possible HR negative since the DCIS is HR negative), there would be a DFS benefit if she were to live 9-10 years. Discussed that declining XRT may be reasonable, but will refer her to rad onc for a consult for she and her daughter to discuss. She is agreeable    Follow-up after early breast cancer was discussed. I recommend follow-up as defined by the American Society of Clinical Oncology and [facility]. This includes a visit to a health care professional every 3-6 months for 3 years, then every 6-12 months for 2 years, and then yearly as well as mammograms yearly.     She will follow up with Dr. Abby Frye for her DCIS and breast cancer    2. Emotional well being:  She has excellent support and is coping well with her disease    I appreciate the opportunity to participate in Ms. Desiree Adler's care. Signed By: Alton Joshi MD      No orders of the defined types were placed in this encounter.

## 2020-08-26 ENCOUNTER — TELEPHONE (OUTPATIENT)
Dept: INTERNAL MEDICINE CLINIC | Age: 85
End: 2020-08-26

## 2020-08-26 NOTE — TELEPHONE ENCOUNTER
----- Message from Binu Thomason sent at 8/26/2020  9:42 AM EDT -----  Regarding: Veronica/Shaq  Contact: [phone]  Appointment Request From: Daniela Henley     With Provider: Zach Guevara MD [-Primary Care Physician-]     Preferred Date Range: From 9/8/2020 To 9/18/2020     Preferred Times: Any     Reason: To address the following health maintenance concerns.    Medicare Yearly Exam     Comments:   Late morning or early afternoon

## 2020-08-27 NOTE — PROGRESS NOTES
This note will not be viewable in [address]     Oncology Navigator  Psychosocial Assessment    Reason for Assessment:    []Depression  []Anxiety  []Caregiver [place]  []Maladaptive Coping with Serious Illness   [x] Social Work Referral [x] Initial Assessment  [] Other     Sources of Information:    [x]Patient  [x]Family  [x]Staff  [x]Medical Record    Advance Care Planning:  Advance Care Planning 7/7/2020   Confirm Advance Directive Yes, not on file       Mental Status:    [x]Alert  []Lethargic  []Unresponsive   [] Unable to assess   Oriented to:  [x]Person  [x]Place  [x]Time  [x]Situation      Barriers to Learning:    []Language  []Developmental  []Cognitive  []Altered Mental Status  []Visual/Hearing Impairment  []Unable to Read/Write  []Motivational   [x]No Barriers Identified  []Other:    Relationship Status:  [x]Single  []  []Significant Other/Life Partner  []  []  []      Living Circumstances:  [x]Lives Alone  []Family/Significant Other in Household  []Roommates  []Children in the Home  []Paid Caregivers  []Assisted Living Facility/Group Home  []Skilled [address]  []Homeless  []Incarcerated  []Environmental/Care Concerns  []other:    Employment Status:  []Employed Full-time []Employed Part-time []Homemaker [] Disabled  [x] Retired []Other:    Support System:    [x]Strong  []Fair  []Limited    Financial/Legal Concerns:    []Uninsured  []Limited Income/Resources  []Non-Citizen  [x]No Concerns Identified  []Financial POA:    []Other:    Baptism/Spiritual/Existential:  []Strong Sense of Spirituality  []Involved in [employer]  []Request  Visit  []Expressing Spiritual/Existential Angst  [x]No Concerns Identified    Coping with Illness:         Patient: Family/Caregiver:   Understanding and Acceptance of Illness/Prognosis  [x] [x]   Strong Sense of Resilience [] []   Self Reflection [] []   Engaged Support System [] []   Does not Readily Discuss Illness [] []   Denial of Terminal Status [] []   Anger [] []   Depression [] []   Anxiety/Fear [] []   Bargaining [] []   Recent Diagnosis/Prognosis [x] []   Difficulties with Body Image [] []   Loss of Identity [] []   Excessive Substance Use [] []   Mental Health History [] []   Enmeshed Relationships [] []   History of Loss [] []   Anticipatory Grief [] []   Concern for Complicated Grief [] []   Suicidal Ideation or Plan [] []   Unable to assess [] []            Narrative: Patient here for consultation with Dr. Kyree Wilson for the management of breast cancer. Met with patient and her daughter, Jennifer Silva, to introduce social work role and supports. Patient lives in independent living in [facility]. She has three children and Jennifer Silva is her primary support with medical appointments. Patient is actively coping with diagnosis and well supported with emotional and practical needs. She denies any worry or sadness regarding her diagnosis. Reviewed barriers to care and none identified at this time. Encouraged patient and family to contact me as needed for psychosocial support. Assessment/Action: Patient is actively coping with diagnosis and treatment and support supported by her daughter, Jennifer Silva. Not barriers or needs identified at this time. Encouraged patient to contact me as needed.      Plan/Referral:     Complementary therapies referral      Thank you,  Elmer Arboleda LCSW

## 2020-08-29 NOTE — TELEPHONE ENCOUNTER
Appointment Information   Name: Fabi Rivera MRN: [id number]    Date: 9/22/2020 Status: [facility]    Time: 10:45 AM Length: 30 [phone]   Visit Type: COMPLETE PHYSICAL [[id number]] Copay: $0.00    Provider: Oumou Mclain MD Department: [facility] - [address]      Notified by James

## 2020-08-30 RX ORDER — MEMANTINE HYDROCHLORIDE 7-14-21-28
KIT ORAL
Qty: 1 DOSE PACK | Refills: 0 | Status: SHIPPED | OUTPATIENT
Start: 2020-08-30 | End: 2020-09-09

## 2020-09-09 ENCOUNTER — TELEPHONE (OUTPATIENT)
Dept: INTERNAL MEDICINE CLINIC | Age: 85
End: 2020-09-09

## 2020-09-09 DIAGNOSIS — G31.84 MCI (MILD COGNITIVE IMPAIRMENT): Primary | ICD-10-CM

## 2020-09-09 RX ORDER — MEMANTINE HYDROCHLORIDE 5 MG-10 MG
KIT ORAL
Qty: 49 TAB | Refills: 0 | Status: SHIPPED | OUTPATIENT
Start: 2020-09-09 | End: 2021-04-01

## 2020-09-09 NOTE — TELEPHONE ENCOUNTER
PA for the patient's Memantine titration capsule pack has been denied. Patient's plan only covers Memantine IR, Donepezil; Galantamine & Rivastigmine. Please advise.

## 2020-09-15 ENCOUNTER — HOSPITAL ENCOUNTER (OUTPATIENT)
Dept: RADIATION THERAPY | Age: 85
Discharge: HOME | End: 2020-09-15

## 2020-09-15 VITALS
SYSTOLIC BLOOD PRESSURE: 156 MMHG | DIASTOLIC BLOOD PRESSURE: 76 MMHG | WEIGHT: 160 LBS | HEIGHT: 63 IN | BODY MASS INDEX: 28.35 KG/M2

## 2020-09-22 ENCOUNTER — OFFICE VISIT (OUTPATIENT)
Dept: INTERNAL MEDICINE CLINIC | Age: 85
End: 2020-09-22
Payer: MEDICARE

## 2020-09-22 VITALS
HEART RATE: 80 BPM | HEIGHT: 63 IN | WEIGHT: 152 LBS | DIASTOLIC BLOOD PRESSURE: 80 MMHG | SYSTOLIC BLOOD PRESSURE: 138 MMHG | RESPIRATION RATE: 16 BRPM | BODY MASS INDEX: 26.93 KG/M2 | OXYGEN SATURATION: 97 %

## 2020-09-22 DIAGNOSIS — G31.84 MCI (MILD COGNITIVE IMPAIRMENT): ICD-10-CM

## 2020-09-22 DIAGNOSIS — Z00.00 MEDICARE ANNUAL WELLNESS VISIT, SUBSEQUENT: Primary | ICD-10-CM

## 2020-09-22 DIAGNOSIS — M19.90 ARTHRITIS: ICD-10-CM

## 2020-09-22 DIAGNOSIS — Z12.12 ENCOUNTER FOR COLORECTAL CANCER SCREENING: ICD-10-CM

## 2020-09-22 DIAGNOSIS — E78.5 HYPERLIPIDEMIA, UNSPECIFIED HYPERLIPIDEMIA TYPE: ICD-10-CM

## 2020-09-22 DIAGNOSIS — I10 ESSENTIAL HYPERTENSION: ICD-10-CM

## 2020-09-22 DIAGNOSIS — E55.9 VITAMIN D DEFICIENCY: ICD-10-CM

## 2020-09-22 DIAGNOSIS — R73.01 IFG (IMPAIRED FASTING GLUCOSE): ICD-10-CM

## 2020-09-22 DIAGNOSIS — F33.9 RECURRENT DEPRESSION (HCC): ICD-10-CM

## 2020-09-22 DIAGNOSIS — Z12.11 ENCOUNTER FOR COLORECTAL CANCER SCREENING: ICD-10-CM

## 2020-09-22 DIAGNOSIS — D05.12 DUCTAL CARCINOMA IN SITU (DCIS) OF LEFT BREAST: ICD-10-CM

## 2020-09-22 DIAGNOSIS — Z71.89 ADVANCED DIRECTIVES, COUNSELING/DISCUSSION: ICD-10-CM

## 2020-09-22 LAB
EST. AVERAGE GLUCOSE BLD GHB EST-MCNC: 134 MG/DL
HBA1C MFR BLD: 6.3 % (ref 4–5.6)

## 2020-09-22 PROCEDURE — G8536 NO DOC ELDER MAL SCRN: HCPCS | Performed by: INTERNAL MEDICINE

## 2020-09-22 PROCEDURE — G8752 SYS BP LESS 140: HCPCS | Performed by: INTERNAL MEDICINE

## 2020-09-22 PROCEDURE — G0439 PPPS, SUBSEQ VISIT: HCPCS | Performed by: INTERNAL MEDICINE

## 2020-09-22 PROCEDURE — 1101F PT FALLS ASSESS-DOCD LE1/YR: CPT | Performed by: INTERNAL MEDICINE

## 2020-09-22 PROCEDURE — G9717 DOC PT DX DEP/BP F/U NT REQ: HCPCS | Performed by: INTERNAL MEDICINE

## 2020-09-22 PROCEDURE — 1090F PRES/ABSN URINE INCON ASSESS: CPT | Performed by: INTERNAL MEDICINE

## 2020-09-22 PROCEDURE — G8427 DOCREV CUR MEDS BY ELIG CLIN: HCPCS | Performed by: INTERNAL MEDICINE

## 2020-09-22 PROCEDURE — G8754 DIAS BP LESS 90: HCPCS | Performed by: INTERNAL MEDICINE

## 2020-09-22 PROCEDURE — 99213 OFFICE O/P EST LOW 20 MIN: CPT | Performed by: INTERNAL MEDICINE

## 2020-09-22 PROCEDURE — G0463 HOSPITAL OUTPT CLINIC VISIT: HCPCS | Performed by: INTERNAL MEDICINE

## 2020-09-22 PROCEDURE — G8419 CALC BMI OUT NRM PARAM NOF/U: HCPCS | Performed by: INTERNAL MEDICINE

## 2020-09-22 PROCEDURE — G8399 PT W/DXA RESULTS DOCUMENT: HCPCS | Performed by: INTERNAL MEDICINE

## 2020-09-22 PROCEDURE — G0444 DEPRESSION SCREEN ANNUAL: HCPCS | Performed by: INTERNAL MEDICINE

## 2020-09-22 RX ORDER — DICLOFENAC SODIUM 75 MG/1
75 TABLET, DELAYED RELEASE ORAL
Qty: 90 TAB | Refills: 1 | Status: SHIPPED | OUTPATIENT
Start: 2020-09-22 | End: 2021-04-01 | Stop reason: SDUPTHER

## 2020-09-22 RX ORDER — SIMVASTATIN 40 MG/1
TABLET, FILM COATED ORAL
Qty: 90 TAB | Refills: 1 | Status: SHIPPED | OUTPATIENT
Start: 2020-09-22 | End: 2020-12-07

## 2020-09-22 RX ORDER — MEMANTINE HYDROCHLORIDE 10 MG/1
10 TABLET ORAL 2 TIMES DAILY
Qty: 180 TAB | Refills: 1 | Status: SHIPPED | OUTPATIENT
Start: 2020-09-22 | End: 2021-04-01

## 2020-09-22 RX ORDER — DULOXETIN HYDROCHLORIDE 60 MG/1
CAPSULE, DELAYED RELEASE ORAL
Qty: 90 CAP | Refills: 1 | Status: SHIPPED | OUTPATIENT
Start: 2020-09-22 | End: 2021-04-01 | Stop reason: SDUPTHER

## 2020-09-22 RX ORDER — LISINOPRIL 20 MG/1
TABLET ORAL
Qty: 90 TAB | Refills: 1 | Status: SHIPPED | OUTPATIENT
Start: 2020-09-22 | End: 2020-12-07

## 2020-09-22 NOTE — ACP (ADVANCE CARE PLANNING)
Advance Care Planning       Advance Care Planning (ACP) Physician/NP/PA (Provider) Conversation        Date of ACP Conversation: 9/22/2020    Conversation Conducted with:   Patient with mild cognitive impairment and decision-maker daughter.     Conversation Outcomes / Follow-Up Plan:   She will provide us with advance care planning      Length of Voluntary ACP Conversation in minutes:  <16 minutes (Non-Billable)      Bolivar Ponce MD

## 2020-09-22 NOTE — PROGRESS NOTES
Lois Nolan is a 82 y.o. female who presents today for Annual Wellness Visit  . She has a history of   Patient Active Problem List   Diagnosis Code    Recurrent depression ([facility] [address] F33.9    Essential hypertension I10    Hyperlipidemia E78.5    MCI (mild cognitive impairment) G31.84    Arthritis M19.90    Age-related nuclear cataract of both eyes H25.13    IFG (impaired fasting glucose) R73.01    Ductal carcinoma in situ (DCIS) of left breast D05.12   . Today patient is here for her Medicare wellness exam.    DCIS: Of left breast with microinvasion status post lumpectomy done by Dr. Elda Olivas in August.  Has been seen by oncology and radiation oncology both suggest no further therapy given her age and stage of cancer. She reports that sinces he is doing well and has healed well from this. .     Hypertension-borderline elevated systolic blood pressure. Given age and comorbidities we will not make any changes to her blood pressures. Hypertension ROS: taking medications as instructed, no medication side effects noted, no TIA's, no chest pain on exertion, no dyspnea on exertion, no swelling of ankles     reports that she has quit smoking. She has a 0.75 pack-year smoking history. She has never used smokeless tobacco.    reports current alcohol use of about 7.0 - 14.0 standard drinks of alcohol per week. BP Readings from Last 2 Encounters:   09/22/20 138/80   09/15/20 (!) 156/76     MCI: Patient was experiencing side effects with Aricept. She stopped this in the spring. Since her daughter has noticed a decline in her short-term memory recall and we have placed her on Namenda. The Namenda XR was not covered by insurance. No s/e with this. Notes continued good decline in short-term memory. Has not had any episodes of acting out. Notes that mood is otherwise pretty good. She does sleep well at night. She does have someone with her virtually all day.   She does not drive she does not take care of her finances. She does still drink on a daily basis which we urged her to stop. Hyperlipidemia  On Zocor  ROS: taking medications as instructed, no medication side effects noted  No new myalgias, no joint pains, no weakness  No TIA's, no chest pain on exertion, no dyspnea on exertion, no swelling of ankles. Lab Results   Component Value Date/Time    Cholesterol, total 178 09/18/2019 12:17 PM    HDL Cholesterol 48 09/18/2019 12:17 PM    LDL, calculated 95 09/18/2019 12:17 PM    VLDL, calculated 35 09/18/2019 12:17 PM    Triglyceride 174 (H) 09/18/2019 12:17 PM     Seeing Derm regularly. Health maintenance hx includes:  Exercise: moderately active. Form of exercise: walking   Diet: generally follows a low fat low cholesterol diet  Still has several drinks daily. Social: retired. Lives at home alone at . [address] 49 3 children.                 for 3 yrs.   [place], but [place] here form [place]. She no longer drives. Her daughter helps her with her finances. Denies any recent falls. Screening:    Colon cancer screening: Patient and daughter would still like screening. Breast cancer screening: Up-to-date and recently treated for breast cancer. Cervical cancer screening: N/A   Osteoporosis screening:  Last BMD:  2008 and revealed    - Normal      Immunizations:     Immunization History   Administered Date(s) Administered    Influenza High Dose Vaccine PF 10/16/2018, 12/12/2019    Pneumococcal Conjugate (PCV-13) 08/14/2015    Pneumococcal Polysaccharide (PPSV-23) 11/17/2000, 02/25/2013    Tdap 10/27/1997, 06/01/2007, 09/18/2019    Zoster Vaccine, Live 06/01/2007      Immunization status: up to date and documented, discussed shingles vaccine. .        ROS  Review of Systems   Constitutional: Negative for chills, fever and weight loss. HENT: Negative for congestion and sore throat.     Eyes: Negative for blurred vision, double vision and photophobia. Respiratory: Negative for cough and shortness of breath. Cardiovascular: Negative for chest pain, palpitations and leg swelling. Gastrointestinal: Negative for abdominal pain, constipation, diarrhea, heartburn, nausea and vomiting. Genitourinary: Negative for dysuria, frequency and urgency. Musculoskeletal: Negative for joint pain and myalgias. Stable. Skin: Negative for rash. Neurological: Negative. Negative for headaches. Endo/Heme/Allergies: Does not bruise/bleed easily. Psychiatric/Behavioral: Positive for memory loss. Negative for suicidal ideas. Visit Vitals  /80   Pulse 80   Resp 16   Ht 5' 3\" (1.6 m)   Wt 152 lb (68.9 kg)   SpO2 97%   BMI 26.93 kg/m²       Physical Exam  Constitutional:       Appearance: She is well-developed. HENT:      Head: Normocephalic and atraumatic. Cardiovascular:      Rate and Rhythm: Normal rate and regular rhythm. Heart sounds: No murmur. Pulmonary:      Effort: Pulmonary effort is normal. No respiratory distress. Skin:     General: Skin is warm and dry. Neurological:      Mental Status: She is alert. Comments: Answers appropriately. Often times looks at her daughter for answers to her medical history. Oriented to situation place and person. Psychiatric:         Behavior: Behavior normal.           Current Outpatient Medications   Medication Sig    memantine (NAMENDA) 10 mg tablet Take 1 Tab by mouth two (2) times a day.  DULoxetine (CYMBALTA) 60 mg capsule TAKE 1 CAPSULE DAILY    lisinopriL (PRINIVIL, ZESTRIL) 20 mg tablet TAKE 1 TABLET BY MOUTH DAILY    simvastatin (ZOCOR) 40 mg tablet TAKE 1 TABLET NIGHTLY    diclofenac EC (VOLTAREN) 75 mg EC tablet Take 1 Tab by mouth nightly.  memantine 5-10 mg DsPk Take as instructed by titration pack    melatonin 10 mg tab Take 10 mg by mouth nightly.  BIOTIN PO Take 1 Cap by mouth daily.     CYANOCOBALAMIN, VITAMIN B-12, PO Take 1 Cap by mouth daily.  multivitamin (ONE A DAY) tablet Take 1 Tab by mouth daily. No current facility-administered medications for this visit. Past Medical History:   Diagnosis Date    Anxiety     Arthritis     Cancer ([address] 06/2020    LEFT breast DCIS    Depression     Hypercholesterolemia     Hypertension     Mild cognitive impairment with memory loss       Past Surgical History:   Procedure Laterality Date    HX APPENDECTOMY  1945    HX BREAST BIOPSY Bilateral 1980's, 2020 1980s negative; 2020 positive left    HX BREAST LUMPECTOMY Left 7/14/2020    LEFT BREAST LUMPECTOMY WITH ULTRASOUND performed by Durga Blancas [facility] at [facility]    HX HYSTERECTOMY  1978    total    HX KNEE REPLACEMENT Left 2015    HX TONSILLECTOMY  1944      Social History     Tobacco Use    Smoking status: Former Smoker     Packs/day: 0.25     Years: 3.00     Pack years: 0.75    Smokeless tobacco: Never Used   Substance Use Topics    Alcohol use: Yes     Alcohol/week: 7.0 - 14.0 standard drinks     Types: 7 - 14 Glasses of wine per week      Family History   Adopted: Yes        No Known Allergies     Assessment/Plan  Diagnoses and all orders for this visit:    1. Medicare annual wellness visit, subsequent-we discussed that her safety is the main concern in quality of life. Urmila Anand was counseled on age-appropriate/ guideline-based risk prevention behaviors and screening for a 82y.o. year old   female . We also discussed adjustments in screening based on family history if necessary. Printed instructions for preventative screening guidelines and healthy behaviors given to patient with after visit summary. 2. IFG (impaired fasting glucose)-repeat A1c  -     HEMOGLOBIN A1C WITH EAG; Future    3. Essential hypertension-acceptable blood pressure continue current therapy  -     METABOLIC PANEL, COMPREHENSIVE;  Future  -     lisinopriL (PRINIVIL, ZESTRIL) 20 mg tablet; TAKE 1 TABLET BY MOUTH DAILY    4. Recurrent depression (HCC)-mood stable. Memory worsening  -     DULoxetine (CYMBALTA) 60 mg capsule; TAKE 1 CAPSULE DAILY    5. MCI (mild cognitive impairment)-urged her to stop all alcohol use. Overall safe at home. Does have a lot of people watching over her. We discussed that given her decline in memory I think it be reasonable for her to repeat neuropsychological testing. We will schedule this for late this year early next year. -     memantine (NAMENDA) 10 mg tablet; Take 1 Tab by mouth two (2) times a day. -     REFERRAL TO NEUROPSYCHOLOGY    6. Ductal carcinoma in situ (DCIS) of left breast- Status post lumpectomy. Recommendations were made for no further therapy but monitoring. 7. Hyperlipidemia, unspecified hyperlipidemia type-continue statins for the time being  -     LIPID PANEL; Future  -     simvastatin (ZOCOR) 40 mg tablet; TAKE 1 TABLET NIGHTLY    8. Vitamin D deficiency  -     VITAMIN D, 25 HYDROXY; Future    9. Advanced directives, counseling/discussion    10. Arthritis-refill  -     diclofenac EC (VOLTAREN) 75 mg EC tablet; Take 1 Tab by mouth nightly. 11. Encounter for colorectal cancer screening  -     COLOGUARD TEST (FECAL DNA COLORECTAL CANCER SCREENING)            Italo Greenwood MD  9/22/2020    This note was created with the help of speech recognition software [employer]) and may contain some 'sound alike' errors. This is the Subsequent Medicare Annual Wellness Exam, performed 12 months or more after the Initial AWV or the last Subsequent AWV    I have reviewed the patient's medical history in detail and updated the computerized patient record.      History     Patient Active Problem List   Diagnosis Code    Recurrent depression ([facility] [address] F33.9    Essential hypertension I10    Hyperlipidemia E78.5    MCI (mild cognitive impairment) G31.84    Arthritis M19.90    Age-related nuclear cataract of both eyes H25.13    IFG (impaired fasting glucose) R73.01    Ductal carcinoma in situ (DCIS) of left breast D05.12     Past Medical History:   Diagnosis Date    Anxiety     Arthritis     Cancer ([address] 06/2020    LEFT breast DCIS    Depression     Hypercholesterolemia     Hypertension     Mild cognitive impairment with memory loss       Past Surgical History:   Procedure Laterality Date    HX APPENDECTOMY  1945    HX BREAST BIOPSY Bilateral 1980's, 2020 1980s negative; 2020 positive left    HX BREAST LUMPECTOMY Left 7/14/2020    LEFT BREAST LUMPECTOMY WITH ULTRASOUND performed by Belen Flower [facility] at [facility]    HX HYSTERECTOMY  1978    total    HX KNEE REPLACEMENT Left 2015    HX TONSILLECTOMY  1944     Current Outpatient Medications   Medication Sig Dispense Refill    memantine (NAMENDA) 10 mg tablet Take 1 Tab by mouth two (2) times a day. 180 Tab 1    DULoxetine (CYMBALTA) 60 mg capsule TAKE 1 CAPSULE DAILY 90 Cap 1    lisinopriL (PRINIVIL, ZESTRIL) 20 mg tablet TAKE 1 TABLET BY MOUTH DAILY 90 Tab 1    simvastatin (ZOCOR) 40 mg tablet TAKE 1 TABLET NIGHTLY 90 Tab 1    diclofenac EC (VOLTAREN) 75 mg EC tablet Take 1 Tab by mouth nightly. 90 Tab 1    memantine 5-10 mg DsPk Take as instructed by titration pack 49 Tab 0    melatonin 10 mg tab Take 10 mg by mouth nightly.  BIOTIN PO Take 1 Cap by mouth daily.  CYANOCOBALAMIN, VITAMIN B-12, PO Take 1 Cap by mouth daily.  multivitamin (ONE A DAY) tablet Take 1 Tab by mouth daily. No Known Allergies    Family History   Adopted: Yes     Social History     Tobacco Use    Smoking status: Former Smoker     Packs/day: 0.25     Years: 3.00     Pack years: 0.75    Smokeless tobacco: Never Used   Substance Use Topics    Alcohol use:  Yes     Alcohol/week: 7.0 - 14.0 standard drinks     Types: 7 - 14 Glasses of wine per week       Depression Risk Factor Screening:     3 most recent PHQ Screens 9/18/2019   PHQ Not Done -   Little interest or pleasure in doing things Not at all   Feeling down, depressed, irritable, or hopeless Not at all   Total Score PHQ 2 0       Alcohol Risk Screen   Do you average more than 1 drink per night or more than 7 drinks a week:  Yes    On any one occasion in the past three months have you have had more than 3 drinks containing alcohol:  No        Functional Ability and Level of Safety:   Hearing: Hearing is good. Activities of Daily Living: The home contains: no safety equipment. Patient does total self care     Ambulation: with no difficulty     Fall Risk:  Fall Risk Assessment, last 12 mths 9/22/2020   Able to walk? Yes   Fall in past 12 months? No     Abuse Screen:  Patient is not abused       Cognitive Screening   Has your family/caregiver stated any concerns about your memory: yes - worsening memory    Patient Care Team   Patient Care Team:  Cortez Joy MD as PCP - General (Internal Medicine)  Cortez Joy MD as PCP - [facility] Empaneled Provider  Ayaz Ritchie MD as Physician (Breast Surgery)  Daya Burger MD as Physician (Hematology and Oncology)    Assessment/Plan   Education and counseling provided:  Are appropriate based on today's review and evaluation  End-of-Life planning (with patient's consent)  Pneumococcal Vaccine  Influenza Vaccine  Colorectal cancer screening tests    Diagnoses and all orders for this visit:    1. Medicare annual wellness visit, subsequent    2. IFG (impaired fasting glucose)  -     HEMOGLOBIN A1C WITH EAG; Future    3. Essential hypertension  -     METABOLIC PANEL, COMPREHENSIVE; Future  -     lisinopriL (PRINIVIL, ZESTRIL) 20 mg tablet; TAKE 1 TABLET BY MOUTH DAILY    4. Recurrent depression (HCC)  -     DULoxetine (CYMBALTA) 60 mg capsule; TAKE 1 CAPSULE DAILY    5. MCI (mild cognitive impairment)  -     memantine (NAMENDA) 10 mg tablet; Take 1 Tab by mouth two (2) times a day. -     REFERRAL TO NEUROPSYCHOLOGY    6. Ductal carcinoma in situ (DCIS) of left breast    7. Hyperlipidemia, unspecified hyperlipidemia type  -     LIPID PANEL; Future  -     simvastatin (ZOCOR) 40 mg tablet; TAKE 1 TABLET NIGHTLY    8. Vitamin D deficiency  -     VITAMIN D, 25 HYDROXY; Future    9. Advanced directives, counseling/discussion    10. Arthritis  -     diclofenac EC (VOLTAREN) 75 mg EC tablet; Take 1 Tab by mouth nightly.     11. Encounter for colorectal cancer screening  -     COLOGUARD TEST (FECAL DNA COLORECTAL CANCER SCREENING)        Health Maintenance Due   Topic Date Due    Shingrix Vaccine Age 49> (1 of 2) 12/08/1987    Flu Vaccine (1) 09/01/2020    Lipid Screen  09/18/2020

## 2020-09-22 NOTE — PATIENT INSTRUCTIONS
Medicare Wellness Visit, Female The best way to live healthy is to have a lifestyle where you eat a well-balanced diet, exercise regularly, limit alcohol use, and quit all forms of tobacco/nicotine, if applicable. Regular preventive services are another way to keep healthy. Preventive services (vaccines, screening tests, monitoring & exams) can help personalize your care plan, which helps you manage your own care. Screening tests can find health problems at the earliest stages, when they are easiest to treat. [facility] follows the current, evidence-based guidelines published by the [facility] Adal[facility] Macario ([facility]) when recommending preventive services for our patients. Because we follow these guidelines, sometimes recommendations change over time as research supports it. (For example, mammograms used to be recommended annually. Even though Medicare will still pay for an annual mammogram, the newer guidelines recommend a mammogram every two years for women of average risk). Of course, you and your doctor may decide to screen more often for some diseases, based on your risk and your co-morbidities (chronic disease you are already diagnosed with). Preventive services for you include: - Medicare offers their members a free annual wellness visit, which is time for you and your primary care provider to discuss and plan for your preventive service needs. Take advantage of this benefit every year! 
-All adults over the age of 72 should receive the recommended pneumonia vaccines. Current USPSTF guidelines recommend a series of two vaccines for the best pneumonia protection.  
-All adults should have a flu vaccine yearly and a tetanus vaccine every 10 years.  
-All adults age 48 and older should receive the shingles vaccines (series of two vaccines).      
-All adults age 38-68 who are overweight should have a diabetes screening test once every three years.  
-All adults born between [birth year] and 1965 should be screened once for Hepatitis C. 
-Other screening tests and preventive services for persons with diabetes include: an eye exam to screen for diabetic retinopathy, a kidney function test, a foot exam, and stricter control over your cholesterol.  
-Cardiovascular screening for adults with routine risk involves an electrocardiogram (ECG) at intervals determined by your doctor.  
-Colorectal cancer screenings should be done for adults age 54-65 with no increased risk factors for colorectal cancer. There are a number of acceptable methods of screening for this type of cancer. Each test has its own benefits and drawbacks. Discuss with your doctor what is most appropriate for you during your annual wellness visit. The different tests include: colonoscopy (considered the best screening method), a fecal occult blood test, a fecal DNA test, and sigmoidoscopy. 
 
-A bone mass density test is recommended when a woman turns 65 to screen for osteoporosis. This test is only recommended one time, as a screening. Some providers will use this same test as a disease monitoring tool if you already have osteoporosis. -Breast cancer screenings are recommended every other year for women of normal risk, age 54-69. 
-Cervical cancer screenings for women over age 72 are only recommended with certain risk factors. Here is a list of your current Health Maintenance items (your personalized list of preventive services) with a due date: 
Health Maintenance Due Topic Date Due  Shingles Vaccine (1 of 2) 12/08/1987  Yearly Flu Vaccine (1) 09/01/2020 Neil Chino Annual Well Visit  09/18/2020  Cholesterol Test   09/18/2020

## 2020-09-23 LAB
25(OH)D3 SERPL-MCNC: 34.7 NG/ML (ref 30–100)
ALBUMIN SERPL-MCNC: 4.4 G/DL (ref 3.5–5)
ALBUMIN/GLOB SERPL: 1.4 {RATIO} (ref 1.1–2.2)
ALP SERPL-CCNC: 77 U/L (ref 45–117)
ALT SERPL-CCNC: 34 U/L (ref 12–78)
ANION GAP SERPL CALC-SCNC: 7 MMOL/L (ref 5–15)
AST SERPL-CCNC: 16 U/L (ref 15–37)
BILIRUB SERPL-MCNC: 0.5 MG/DL (ref 0.2–1)
BUN SERPL-MCNC: 21 MG/DL (ref 6–20)
BUN/CREAT SERPL: 22 (ref 12–20)
CALCIUM SERPL-MCNC: 9.7 MG/DL (ref 8.5–10.1)
CHLORIDE SERPL-SCNC: 107 MMOL/L (ref 97–108)
CHOLEST SERPL-MCNC: 172 MG/DL
CO2 SERPL-SCNC: 26 MMOL/L (ref 21–32)
CREAT SERPL-MCNC: 0.94 MG/DL (ref 0.55–1.02)
GLOBULIN SER CALC-MCNC: 3.2 G/DL (ref 2–4)
GLUCOSE SERPL-MCNC: 102 MG/DL (ref 65–100)
HDLC SERPL-MCNC: 54 MG/DL
HDLC SERPL: 3.2 {RATIO} (ref 0–5)
LDLC SERPL CALC-MCNC: 95.2 MG/DL (ref 0–100)
LIPID PROFILE,FLP: NORMAL
POTASSIUM SERPL-SCNC: 4.5 MMOL/L (ref 3.5–5.1)
PROT SERPL-MCNC: 7.6 G/DL (ref 6.4–8.2)
SODIUM SERPL-SCNC: 140 MMOL/L (ref 136–145)
TRIGL SERPL-MCNC: 114 MG/DL (ref ?–150)
VLDLC SERPL CALC-MCNC: 22.8 MG/DL

## 2020-12-03 ENCOUNTER — TELEPHONE (OUTPATIENT)
Dept: INTERNAL MEDICINE CLINIC | Age: 85
End: 2020-12-03

## 2020-12-03 NOTE — TELEPHONE ENCOUNTER
----- Message from Chente Peña sent at 12/3/2020 10:22 AM EST -----  Regarding: Dr. Foreign Rivera first and last name: Kirill/Enrique  Reason for call: abnormal coleguard  Callback required yes/no and why: yes  Best contact number(s): [phone]  Details to clarify the request: Jocelyn Boas is calling to confirm fax has been received for abnormal coleguard testing for MsManuel Tyson Sandee.

## 2020-12-07 DIAGNOSIS — I10 ESSENTIAL HYPERTENSION: ICD-10-CM

## 2020-12-07 DIAGNOSIS — E78.5 HYPERLIPIDEMIA, UNSPECIFIED HYPERLIPIDEMIA TYPE: ICD-10-CM

## 2020-12-07 RX ORDER — SIMVASTATIN 40 MG/1
TABLET, FILM COATED ORAL
Qty: 90 TAB | Refills: 1 | Status: SHIPPED | OUTPATIENT
Start: 2020-12-07 | End: 2021-04-01 | Stop reason: SDUPTHER

## 2020-12-07 RX ORDER — LISINOPRIL 20 MG/1
TABLET ORAL
Qty: 90 TAB | Refills: 1 | Status: SHIPPED | OUTPATIENT
Start: 2020-12-07 | End: 2021-04-01 | Stop reason: SDUPTHER

## 2020-12-09 ENCOUNTER — TELEPHONE (OUTPATIENT)
Dept: INTERNAL MEDICINE CLINIC | Age: 85
End: 2020-12-09

## 2020-12-09 ENCOUNTER — OFFICE VISIT (OUTPATIENT)
Dept: NEUROLOGY | Age: 85
End: 2020-12-09
Payer: MEDICARE

## 2020-12-09 VITALS — TEMPERATURE: 97 F

## 2020-12-09 DIAGNOSIS — R41.89 COGNITIVE DECLINE: ICD-10-CM

## 2020-12-09 DIAGNOSIS — G31.84 MILD COGNITIVE IMPAIRMENT: Primary | ICD-10-CM

## 2020-12-09 DIAGNOSIS — Z86.59 HISTORY OF ANXIETY: ICD-10-CM

## 2020-12-09 DIAGNOSIS — Z86.59 HISTORY OF DEPRESSION: ICD-10-CM

## 2020-12-09 PROCEDURE — 90791 PSYCH DIAGNOSTIC EVALUATION: CPT | Performed by: CLINICAL NEUROPSYCHOLOGIST

## 2020-12-09 NOTE — TELEPHONE ENCOUNTER
Spoke with daughter about positive Cologuard test.  We discussed that the next step is colonoscopy. Patient's daughter understands.   We will call make an appointment with gastroenterologist.

## 2020-12-09 NOTE — PROGRESS NOTES
[address] [facility],5Th [address]  [address]   [place] [address] [facility][1]   Marta Huston    [phone] Office   [phone] Fax      Neuropsychology    Initial Diagnostic Interview Note      Referral:  Juan R Nunes MD    James Pearson is a 82 y.o. right handed   female who was accompanied by her daughter to the initial clinical interview on 20. Please refer to her medical records for details pertaining to her history. At the start of the appointment, I reviewed the patient's [facility] Epic Chart (including Media scanned in from previous providers) for the active Problem List, all pertinent Past Medical Hx, medications, recent radiologic and laboratory findings. In addition, I reviewed pt's documented Immunization Record and Encounter History. She completed two years of college without history of previously diagnosed LD and/or receipt of special education services. She lives alone. She is not working. She has noticed that her memory is a little different. She has had progressive issues with memory for the past maybe 2 years or so? Daughter notices the decline since her father  five years ago. Moved here into [facility] and has been here three years ago. Last 19 months it has really declined. She was put on Aricept. She has periods where she doesn't know where she is. She doesn't know where she is but recognizes her things. Thinks she is still in [place] or being back in [place] which is where she is originally from. Forgets who is  to whom. Forgets the content of conversations. On the way here asked about 5 times what kind of doctor I am. She has been on Namenda now six months. Stopped Aricept as she was having some side effects and stopped that this spring. The Namenda XR was not covered by insurance. She is not having side effects. She has a significant decline that is ongoing. She has no aggressive or other dangerous behaviors. She does sleep well. She does have someone around during the day. She does not drive. They assist with meds and family does the finances. No background known stroke, meningitis/encephalitis, NOBLE Fever, Lupus, Lyme, TBI, sz. She would not remember to eat and she has an apartment there and someone is there to assist her. She would not be living independently if it wasn't for friend helping her and daughter lives nearby. She tends to stay pretty happy, but has had periods where she has had some depression and has sundowning also and is also drinking wine then also. She has had bouts of depression. Long term memory issues also. Wonders if her mother is still alive. She is on Cymbalta for mood. Her spouse was very sick for about three years and this as a depressing and anxious time and she started to go downhill around then. Language is a strength here may be masking underlying deficits. No previously neuropsych.      Neuropsychological Mental Status Exam (NMSE):    Historian: Fair  Praxis: No UE apraxia  R/L Orientation: Intact to self and to other  Dress: within normal limits   Weight: within normal limits   Appearance/Hygiene: within normal limits   Gait: within normal limits   Assistive Devices: None  Mood: within normal limits   Affect: within normal limits   Comprehension: within normal limits   Thought Process: within normal limits   Expressive Language: within normal limits   Receptive Language: within normal limits   Motor:  No cognitive or motor perseveration  ETOH: Couple of drinks at night, trying to cut that back  Tobacco: Denied  Illicit: Denied  SI/HI: Denied  Psychosis: Denied  Insight: Fair  Judgment: TBD  Other Psych:       Past Medical History:   Diagnosis Date    Anxiety     Arthritis     Cancer ([facility] [address] 06/2020    LEFT breast DCIS    Depression     Hypercholesterolemia     Hypertension     Mild cognitive impairment with memory loss Past Surgical History:   Procedure Laterality Date    HX APPENDECTOMY  1945    HX BREAST BIOPSY Bilateral 1980's, 2020    1980s negative; 2020 positive left    HX BREAST LUMPECTOMY Left 7/14/2020    LEFT BREAST LUMPECTOMY WITH ULTRASOUND performed by Dipti Arellano MD at [address] HX HYSTERECTOMY  1978    total    HX KNEE REPLACEMENT Left 2015    HX TONSILLECTOMY  1944       No Known Allergies    Family History   Adopted: Yes       Social History     Tobacco Use    Smoking status: Former Smoker     Packs/day: 0.25     Years: 3.00     Pack years: 0.75    Smokeless tobacco: Never Used   Substance Use Topics    Alcohol use: Yes     Alcohol/week: 7.0 - 14.0 standard drinks     Types: 7 - 14 Glasses of wine per week    Drug use: No       Current Outpatient Medications   Medication Sig Dispense Refill    simvastatin (ZOCOR) 40 mg tablet TAKE 1 TABLET BY MOUTH EVERY NIGHT 90 Tab 1    lisinopriL (PRINIVIL, ZESTRIL) 20 mg tablet TAKE 1 TABLET BY MOUTH DAILY 90 Tab 1    memantine (NAMENDA) 10 mg tablet Take 1 Tab by mouth two (2) times a day. 180 Tab 1    DULoxetine (CYMBALTA) 60 mg capsule TAKE 1 CAPSULE DAILY 90 Cap 1    diclofenac EC (VOLTAREN) 75 mg EC tablet Take 1 Tab by mouth nightly. 90 Tab 1    memantine 5-10 mg DsPk Take as instructed by titration pack 49 Tab 0    melatonin 10 mg tab Take 10 mg by mouth nightly.  BIOTIN PO Take 1 Cap by mouth daily.  CYANOCOBALAMIN, VITAMIN B-12, PO Take 1 Cap by mouth daily.  multivitamin (ONE A DAY) tablet Take 1 Tab by mouth daily. Plan:  Obtain authorization for testing from insurance company. Report to follow once testing, scoring, and interpretation completed. ? Organic based neurocognitive issues versus mood disorder or combination of same. ? Problems organic, functional, or both? This note will not be viewable in [address]

## 2020-12-10 NOTE — TELEPHONE ENCOUNTER
Spoke with pts daughter Janet Ludwig, and informed her of pts apt with Garrett Mobley on 1/13/20 at 1000 with Dr Lisette Leal at the [facility] location. Daughter states understanding. Positive Cologuard test was faxed to [facility] for review prior to pt apt.

## 2021-01-11 ENCOUNTER — OFFICE VISIT (OUTPATIENT)
Dept: SURGERY | Age: 86
End: 2021-01-11
Payer: MEDICARE

## 2021-01-11 VITALS
BODY MASS INDEX: 26.93 KG/M2 | SYSTOLIC BLOOD PRESSURE: 180 MMHG | DIASTOLIC BLOOD PRESSURE: 70 MMHG | HEIGHT: 63 IN | TEMPERATURE: 98 F | WEIGHT: 152 LBS | HEART RATE: 78 BPM

## 2021-01-11 DIAGNOSIS — D05.12 DUCTAL CARCINOMA IN SITU (DCIS) OF LEFT BREAST: Primary | ICD-10-CM

## 2021-01-11 DIAGNOSIS — N64.1 FAT NECROSIS OF LEFT BREAST: ICD-10-CM

## 2021-01-11 PROCEDURE — 1101F PT FALLS ASSESS-DOCD LE1/YR: CPT | Performed by: SURGERY

## 2021-01-11 PROCEDURE — 1090F PRES/ABSN URINE INCON ASSESS: CPT | Performed by: SURGERY

## 2021-01-11 PROCEDURE — 99213 OFFICE O/P EST LOW 20 MIN: CPT | Performed by: SURGERY

## 2021-01-11 PROCEDURE — G8399 PT W/DXA RESULTS DOCUMENT: HCPCS | Performed by: SURGERY

## 2021-01-11 PROCEDURE — G8536 NO DOC ELDER MAL SCRN: HCPCS | Performed by: SURGERY

## 2021-01-11 PROCEDURE — 76642 ULTRASOUND BREAST LIMITED: CPT | Performed by: SURGERY

## 2021-01-11 PROCEDURE — G9717 DOC PT DX DEP/BP F/U NT REQ: HCPCS | Performed by: SURGERY

## 2021-01-11 PROCEDURE — G8419 CALC BMI OUT NRM PARAM NOF/U: HCPCS | Performed by: SURGERY

## 2021-01-11 PROCEDURE — G8754 DIAS BP LESS 90: HCPCS | Performed by: SURGERY

## 2021-01-11 PROCEDURE — G8427 DOCREV CUR MEDS BY ELIG CLIN: HCPCS | Performed by: SURGERY

## 2021-01-11 PROCEDURE — G8753 SYS BP > OR = 140: HCPCS | Performed by: SURGERY

## 2021-01-11 NOTE — PROGRESS NOTES
HISTORY OF PRESENT ILLNESS Pina Monique is a 82 y.o. female. HPI ESTABLISHED patient here today for LEFT breast tenderness especially while laying down or turning over in bed. The patient is S/P LEFT lumpectomy and had no chemo or radiation. This discomfort has been happening intermittently since 10/2020. 
06/18/20: LEFT breast bx, 12:00-1:00. PATH: Microscopic foci of DCIS. ER was performed and is negative, 0%; however, this is based upon a very few cells and should repeated if additional material is available. Clinical stage 0. 
 
07/14/20: LEFT breast lumpectomy. PATH: DCIS with microinvasion, involved a 3.4cm area, ER-/[place]-, negative margins, no LNs submitted or found. Pathologic stage: pT1mi, pNX. 
 
 
ROS Physical Exam 
 
ASSESSMENT and PLAN 
{ASSESSMENT/PLAN:90957}

## 2021-01-11 NOTE — PROGRESS NOTES
HISTORY OF PRESENT ILLNESS  Sidney Thomas is a 82 y.o. female. HPI  ESTABLISHED patient here today for LEFT breast tenderness especially while laying down or turning over in bed. The patient is S/P LEFT lumpectomy and had no chemo or radiation. This discomfort has been happening intermittently since 10/2020.    06/18/20: LEFT breast bx, 12:00-1:00. PATH: Microscopic foci of DCIS. ER was performed and is negative, 0%; however, this is based upon a very few cells and should repeated if additional material is available. Clinical stage 0.     07/14/20: LEFT breast lumpectomy. PATH: DCIS with microinvasion, involved a 3.4cm area, ER-/[place]-, negative margins, no LNs submitted or found. Pathologic stage: pT1mi, pNX.       Past Medical History:   Diagnosis Date    Anxiety     Arthritis     Cancer ([facility] [address] 06/2020    LEFT breast DCIS    Depression     Hypercholesterolemia     Hypertension     Mild cognitive impairment with memory loss        Past Surgical History:   Procedure Laterality Date    HX APPENDECTOMY  1945    HX BREAST BIOPSY Bilateral 1980's, 2020 1980s negative; 2020 positive left    HX BREAST LUMPECTOMY Left 7/14/2020    LEFT BREAST LUMPECTOMY WITH ULTRASOUND performed by Terry Hernandez MD at [facility] HX HYSTERECTOMY  1978    total    HX KNEE REPLACEMENT Left 2015    HX TONSILLECTOMY  1944       Social History     Socioeconomic History    Marital status:      Spouse name: Not on file    Number of children: Not on file    Years of education: Not on file    Highest education level: Not on file   Occupational History    Not on file   Social Needs    Financial resource strain: Not on file    Food insecurity     Worry: Not on file     Inability: Not on file    Transportation needs     Medical: Not on file     Non-medical: Not on file   Tobacco Use    Smoking status: Former Smoker     Packs/day: 0.25     Years: 3.00     Pack years: 0.75    Smokeless tobacco: Never Used   Substance and Sexual Activity    Alcohol use: Yes     Alcohol/week: 7.0 - 14.0 standard drinks     Types: 7 - 14 Glasses of wine per week    Drug use: No    Sexual activity: Not Currently     Partners: Male   Lifestyle    Physical activity     Days per week: Not on file     Minutes per session: Not on file    Stress: Not on file   Relationships    Social connections     Talks on phone: Not on file     Gets together: Not on file     Attends Tenriism service: Not on file     Active member of club or organization: Not on file     Attends meetings of clubs or organizations: Not on file     Relationship status: Not on file    Intimate partner violence     Fear of current or ex partner: Not on file     Emotionally abused: Not on file     Physically abused: Not on file     Forced sexual activity: Not on file   Other Topics Concern    Not on file   Social History Narrative    Not on file       Current Outpatient Medications on File Prior to Visit   Medication Sig Dispense Refill    simvastatin (ZOCOR) 40 mg tablet TAKE 1 TABLET BY MOUTH EVERY NIGHT 90 Tab 1    lisinopriL (PRINIVIL, ZESTRIL) 20 mg tablet TAKE 1 TABLET BY MOUTH DAILY 90 Tab 1    memantine (NAMENDA) 10 mg tablet Take 1 Tab by mouth two (2) times a day. 180 Tab 1    DULoxetine (CYMBALTA) 60 mg capsule TAKE 1 CAPSULE DAILY 90 Cap 1    diclofenac EC (VOLTAREN) 75 mg EC tablet Take 1 Tab by mouth nightly. 90 Tab 1    memantine 5-10 mg DsPk Take as instructed by titration pack 49 Tab 0    melatonin 10 mg tab Take 10 mg by mouth nightly.  BIOTIN PO Take 1 Cap by mouth daily.  CYANOCOBALAMIN, VITAMIN B-12, PO Take 1 Cap by mouth daily.  multivitamin (ONE A DAY) tablet Take 1 Tab by mouth daily. No current facility-administered medications on file prior to visit. No Known Allergies    OB History    No obstetric history on file.       Obstetric Comments   Menarche 17, LMP 0, # of children 1, age of 4st delivery 27, Hysterectomy/oophorectomy Yes/Yes, Breast bx Yes, history of breast feeding No, BCP Yes, in the past, Hormone therapy No               ROS    Physical Exam  Exam conducted with a chaperone present. Cardiovascular:      Rate and Rhythm: Normal rate and regular rhythm. Heart sounds: Normal heart sounds. Pulmonary:      Breath sounds: Normal breath sounds. Chest:      Breasts: Breasts are symmetrical.         Right: Normal. No swelling, bleeding, inverted nipple, mass, nipple discharge, skin change or tenderness. Left: Inverted nipple and tenderness present. No swelling, bleeding, mass, nipple discharge or skin change. Lymphadenopathy:      Cervical:      Right cervical: No superficial, deep or posterior cervical adenopathy. Left cervical: No superficial, deep or posterior cervical adenopathy. Upper Body:      Right upper body: No supraclavicular or axillary adenopathy. Left upper body: No supraclavicular or axillary adenopathy. BREAST ULTRASOUND  Indication: LEFT breast pain  Technique: The area was scanned using a high-frequency linear-array near-field transducer  Findings: Hyperechoic area with central fluid, corresponding to tender area  Impression: Probable fat necrosis  Disposition: No worrisome finding on ultrasound      ASSESSMENT and PLAN    ICD-10-CM ICD-9-CM    1. Ductal carcinoma in situ (DCIS) of left breast  D05.12 233.0    2. Fat necrosis of left breast  N64.1 611.3       Established patient presents for evaluation of LEFT breast tenderness, and is doing well overall. Well healed incision s/p lumpectomy, no evidence of local recurrence. LEFT breast US visualizes fat necrosis. Pt already takes NSAID (Voltaren); advised her to add vitamin E for breast pain if NSAID isn't helping. F/U prn. This plan was reviewed with the patient and patient agrees. All questions were answered. Total time spent was 20 minutes.     Written by Nikita Arredondo as dictated by Dr. Boy Davison MD.

## 2021-01-11 NOTE — PATIENT INSTRUCTIONS
Breast Pain: Care Instructions Your Care Instructions Breast tenderness and pain may come and go with your monthly periods (cyclic), or it may not follow any pattern (noncyclic). Breast pain is rarely caused by a serious health problem. You may need tests to find the cause. Follow-up care is a key part of your treatment and safety. Be sure to make and go to all appointments, and call your doctor if you are having problems. It's also a good idea to know your test results and keep a list of the medicines you take. How can you care for yourself at home? · If your doctor gave you medicine, take it exactly as prescribed. Call your doctor if you think you are having a problem with your medicine. · Take an over-the-counter pain medicine, such as acetaminophen (Tylenol), ibuprofen (Advil, Motrin), or naproxen (Aleve), to relieve pain and swelling. Read and follow all instructions on the label. · Do not take two or more pain medicines at the same time unless the doctor told you to. Many pain medicines have acetaminophen, which is Tylenol. Too much acetaminophen (Tylenol) can be harmful. · Wear a supportive bra, such as a sports bra or a jog bra. · Cut down on the amount of fat in your diet. If you need help planning healthy meals, see a dietitian. · Get at least 30 minutes of exercise on most days of the week. Walking is a good choice. You also may want to do other activities, such as running, swimming, cycling, or playing tennis or team sports. · Keep a healthy sleep pattern. Go to bed at the same time every night, and get up at the same time every day. When should you call for help? Call your doctor now or seek immediate medical care if: 
  · You have new changes in a breast, such as: ? A lump or thickening in your breast or armpit. ? A change in the breast's size or shape. ? Skin changes, such as dimples or puckers. ? Nipple discharge.  
? A change in the color or feel of the skin of your breast or the darker area around the nipple (areola). ? A change in the shape of the nipple (it may look like it's being pulled into the breast).  
  · You have symptoms of a breast infection, such as: 
? Increased pain, swelling, redness, or warmth around a breast. 
? Red streaks extending from the breast. 
? Pus draining from a breast. 
? A fever. Watch closely for changes in your health, and be sure to contact your doctor if: 
  · Your breast pain does not get better after 1 week.  
  · You have a lump or thickening in your breast or armpit. Where can you learn more? Go to http://www.gray.com/ Enter Y842 in the search box to learn more about \"Breast Pain: Care Instructions. \" Current as of: June 26, 2019               Content Version: 12.6 © 7736-5791 [employer]. Care instructions adapted under license by [employer] (which disclaims liability or warranty for this information). If you have questions about a medical condition or this instruction, always ask your healthcare professional. [address] any warranty or liability for your use of this information.

## 2021-02-19 ENCOUNTER — TELEPHONE (OUTPATIENT)
Dept: NEUROLOGY | Age: 86
End: 2021-02-19

## 2021-02-23 ENCOUNTER — OFFICE VISIT (OUTPATIENT)
Dept: NEUROLOGY | Age: 86
End: 2021-02-23
Payer: MEDICARE

## 2021-02-23 DIAGNOSIS — F02.80 LATE ONSET ALZHEIMER'S DISEASE WITHOUT BEHAVIORAL DISTURBANCE (HCC): Primary | ICD-10-CM

## 2021-02-23 DIAGNOSIS — G30.1 LATE ONSET ALZHEIMER'S DISEASE WITHOUT BEHAVIORAL DISTURBANCE (HCC): Primary | ICD-10-CM

## 2021-02-23 DIAGNOSIS — F41.9 ANXIETY AND DEPRESSION: ICD-10-CM

## 2021-02-23 DIAGNOSIS — F32.A ANXIETY AND DEPRESSION: ICD-10-CM

## 2021-02-23 DIAGNOSIS — F10.10 EXCESSIVE DRINKING ALCOHOL: ICD-10-CM

## 2021-02-23 PROCEDURE — 96133 NRPSYC TST EVAL PHYS/QHP EA: CPT | Performed by: CLINICAL NEUROPSYCHOLOGIST

## 2021-02-23 PROCEDURE — 96139 PSYCL/NRPSYC TST TECH EA: CPT | Performed by: CLINICAL NEUROPSYCHOLOGIST

## 2021-02-23 PROCEDURE — 96138 PSYCL/NRPSYC TECH 1ST: CPT | Performed by: CLINICAL NEUROPSYCHOLOGIST

## 2021-02-23 PROCEDURE — 96137 PSYCL/NRPSYC TST PHY/QHP EA: CPT | Performed by: CLINICAL NEUROPSYCHOLOGIST

## 2021-02-23 PROCEDURE — 96132 NRPSYC TST EVAL PHYS/QHP 1ST: CPT | Performed by: CLINICAL NEUROPSYCHOLOGIST

## 2021-02-23 PROCEDURE — 96136 PSYCL/NRPSYC TST PHY/QHP 1ST: CPT | Performed by: CLINICAL NEUROPSYCHOLOGIST

## 2021-02-23 NOTE — LETTER
2/25/2021 Patient: Pollo Santana YOB: 1935 Date of Visit: 2/23/2021 Marisa Newsome MD 
[address] [facility] [address] [place] [id number] [address] [employer] Dear Marisa Newsome MD, Thank you for referring Ms. Cindy Harrington to [facility] for evaluation. My notes for this consultation are attached. If you have questions, please do not hesitate to call me. I look forward to following your patient along with you. Sincerely, Lucian Santo PsyD

## 2021-02-26 NOTE — PROGRESS NOTES
[address] [facility],5Th Floor  [address]   [place] [address] [facility]   [address]   [phone] Office   [phone] Fax      Neuropsychological Evaluation Report    Referral:  Tammie Bird MD    Ashley Villanueva is a 82 y.o. right handed   female who was accompanied by her daughter to the initial clinical interview on 20. Please refer to her medical records for details pertaining to her history. At the start of the appointment, I reviewed the patient's [facility] Epic Chart (including Media scanned in from previous providers) for the active Problem List, all pertinent Past Medical Hx, medications, recent radiologic and laboratory findings. In addition, I reviewed pt's documented Immunization Record and Encounter History. She completed two years of college without history of previously diagnosed LD and/or receipt of special education services. She lives alone. She is not working. She has noticed that her memory is a little different. She has had progressive issues with memory for the past maybe 2 years or so? Daughter notices the decline since her father  five years ago. Moved here into [facility] and has been here three years ago. Last 19 months it has really declined. She was put on Aricept. She has periods where she doesn't know where she is. She doesn't know where she is but recognizes her things. Thinks she is still in [place] or being back in [place] which is where she is originally from. Forgets who is  to whom. Forgets the content of conversations. On the way here asked about 5 times what kind of doctor I am. She has been on Namenda now six months. Stopped Aricept as she was having some side effects and stopped that this spring. The Namenda XR was not covered by insurance. She is not having side effects. She has a significant decline that is ongoing.   She has no aggressive or other dangerous behaviors. She does sleep well. She does have someone around during the day. She does not drive. They assist with meds and family does the finances. No background known stroke, meningitis/encephalitis, NOBLE Fever, Lupus, Lyme, TBI, sz. She would not remember to eat and she has an apartment there and someone is there to assist her. She would not be living independently if it wasn't for friend helping her and daughter lives nearby. She tends to stay pretty happy, but has had periods where she has had some depression and has sundowning also and is also drinking wine then also. She has had bouts of depression. Long term memory issues also. Wonders if her mother is still alive. She is on Cymbalta for mood. Her spouse was very sick for about three years and this as a depressing and anxious time and she started to go downhill around then. Language is a strength here may be masking underlying deficits. No previously neuropsych.      Neuropsychological Mental Status Exam (NMSE):    Historian: Fair  Praxis: No UE apraxia  R/L Orientation: Intact to self and to other  Dress: within normal limits   Weight: within normal limits   Appearance/Hygiene: within normal limits   Gait: within normal limits   Assistive Devices: None  Mood: within normal limits   Affect: within normal limits   Comprehension: within normal limits   Thought Process: within normal limits   Expressive Language: within normal limits   Receptive Language: within normal limits   Motor:  No cognitive or motor perseveration  ETOH: Couple of drinks at night, trying to cut that back  Tobacco: Denied  Illicit: Denied  SI/HI: Denied  Psychosis: Denied  Insight: Fair  Judgment: TBD  Other Psych:       Past Medical History:   Diagnosis Date    Anxiety     Arthritis     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    Depression     Hypercholesterolemia     Hypertension     Mild cognitive impairment with memory loss        Past Surgical History:   Procedure Laterality Date    HX APPENDECTOMY  1945    HX BREAST BIOPSY Bilateral 1980's, 2020 1980s negative; 2020 positive left    HX BREAST LUMPECTOMY Left 7/14/2020    LEFT BREAST LUMPECTOMY WITH ULTRASOUND performed by Luís Leblanc MD at [address] HX HYSTERECTOMY  1978    total    HX KNEE REPLACEMENT Left 2015    HX TONSILLECTOMY  1944       No Known Allergies    Family History   Adopted: Yes       Social History     Tobacco Use    Smoking status: Former Smoker     Packs/day: 0.25     Years: 3.00     Pack years: 0.75    Smokeless tobacco: Never Used   Substance Use Topics    Alcohol use: Yes     Alcohol/week: 7.0 - 14.0 standard drinks     Types: 7 - 14 Glasses of wine per week    Drug use: No       Current Outpatient Medications   Medication Sig Dispense Refill    simvastatin (ZOCOR) 40 mg tablet TAKE 1 TABLET BY MOUTH EVERY NIGHT 90 Tab 1    lisinopriL (PRINIVIL, ZESTRIL) 20 mg tablet TAKE 1 TABLET BY MOUTH DAILY 90 Tab 1    memantine (NAMENDA) 10 mg tablet Take 1 Tab by mouth two (2) times a day. 180 Tab 1    DULoxetine (CYMBALTA) 60 mg capsule TAKE 1 CAPSULE DAILY 90 Cap 1    diclofenac EC (VOLTAREN) 75 mg EC tablet Take 1 Tab by mouth nightly. 90 Tab 1    memantine 5-10 mg DsPk Take as instructed by titration pack 49 Tab 0    melatonin 10 mg tab Take 10 mg by mouth nightly.  BIOTIN PO Take 1 Cap by mouth daily.  CYANOCOBALAMIN, VITAMIN B-12, PO Take 1 Cap by mouth daily.  multivitamin (ONE A DAY) tablet Take 1 Tab by mouth daily. Plan:  Obtain authorization for testing from insurance company. Report to follow once testing, scoring, and interpretation completed. ? Organic based neurocognitive issues versus mood disorder or combination of same. ? Problems organic, functional, or both? This note will not be viewable in [address]         Neuropsychological Evaluation  Patient Testing 2/23/21 Report Completed 2/24/21  A Psychometrist Assisted w/ portions of this evaluation while under my direct supervision    Please refer to the patient's initial interview progress note (copied above) and her medical records for details pertaining to her history. Today's neuropsychological test scores follow: The following assessment procedures were performed:      Neuropsychologist Performed, Interpreted, & Reported: Neuropsychological Mental Status Exam, Revised Memory & Behavior Checklist, Mini Mental State Exam, Clock Drawing Test, Test Of Premorbid Functioning, Gabby-Melzack Pain Questionnaire,  History Taking  & Clinical Interview With The Patient, Additional History Taking w/ The Patient's Daughter, CASE, SCL-90, CPT,  ABAS-3, Review Of Available Records. Psychometrist Administered & Neuropsychologist Interpreted & Neuropsychologist Reported: Verbal Fluency Tests, Nikita & Nikita  Revised, CPT-III, Repeatable Battery For The Assessment Of Neuropsychological Status, Abena Dementia Rating Scale  2, WASI-II, Trailmaking Test Parts A & B, Geriatric Depression Inventory, Puente Anxiety Inventory, [place] Functional Living Scale. Test Findings:  Note:  The patients raw data have been compared with currently available norms which include demographic corrections for age, gender, and/or education. Sometimes, the patients scores are compared to demographically similar individuals as close to the patients age, education level, etc., as possible. \"Average\" is viewed as being +/- 1 standard deviation (SD) from the stated mean for a particular test score. \"Low average\" is viewed as being between 1 and 2 SD below the mean, and above average is viewed as being 1 and 2 SD above the mean. Scores falling in the borderline range (between 1-1/2 and 2 SD below the mean) are viewed with particular attention as to whether they are normal or abnormal neurocognitive test scores.   Other methods of inference in analyzing the test data are also utilized, including the pattern and range of scores in the profile, bilateral motor functions, and the presence, if any, of pathognomonic signs. Behaviorally, the patient was friendly and cooperative and appeared motivated to perform well during this examination. Within this context, the results of this evaluation are viewed as a valid reflection of the patients actual neurocognitive and emotional status. The patient's score of 19/30 on the Mini-Mental Status Exam was impaired. In this regard, she was not oriented to year, month, day, date, county, city. Backward spelling was 4/5 correct. Recall for 3 words after brief delay was 0/3 correct. Repetition was impaired. Clock drawing was severely impaired. Her structured fluid word list fluency, as assessed by the FAS test, was within the mildly impaired range with a T score of 39. Category fluency was within the mildly to moderately impaired range with a T score of 34. Confrontation naming, as assessed by the [facility] as revised, was within the mildly impaired range with a T score of 39. This pattern performance is indicative of patient is increased risk for day-to-day problems with verbal fluency, mental status, and confrontation naming. The patient was administered the Cesar' Continuous Performance Test-III and review of the subscales concerns for  inattentiveness without impulsivity. This pattern of performance is indicative of a patient who is at increased risk for day-to-day problems with sustained visual attention/concentration. The patient was administered the Abena Dementia Rating Scale -2 and her total score of 105/144 correct corresponds to an age- and education- corrected MOANS Scaled Score of 1 (<1st %ile) and indicates a severely impaired level of performance.   In this regard, her simple attention and construction abilities were normal.  At the same time, his conceptualization, memory, and initiation/perseveration abilities were markedly impaired. This pattern of performance is indicative of a patient who is at increased risk for day-to-day problems across a variety of neurocognitive domains. The patient was administered the Repeatable Battery for the Assessment Of Neuropsychological Status (RBANS) and her age-corrected scores are as follows:    Domain:   Index Score %ile Classification    Immediate Memory 57  0.2 Extremely Low     Delayed Memory 48  <0.1 Extremely Low    Visuospatial Skills 64   1 Extremely Low     Language  80  9 Low Average    Attention  75  5 Borderline     Overall Functioning 55  0.1 Extremely Low    ______________________________________________    As can be seen, she is showing problems across the majority of neurocognitive domains assessed, masked to some degree by normal range casual language skills. The patient was administered the WASI-II and generated a Full-4 Estimated FSIQ score of 86 (18th %ile) which was within the borderline range. Her estimated Verbal Comprehension Index score of 99 (47th %ile) was within the borderline range. Her estimated Perceptual Reasoning Index score of 73 (4th %Ile) was also within the borderline range. The difference between the two is clinically significant. . The latter score is lower than what would be expected based on her performance on a task estimating premorbid function levels. Simple timed visual motor sequencing (Trailmaking Test Part A) was within the mildly to moderately impaired range with a T score of 34. Her performance on a similar, but more complex task of timed visual motor sequencing (Trailmaking Test Part B) was within the moderately to severely impaired range with a T score of 22. This latter test was discontinued. This pattern of performance is indicative of a patient who is at increased risk for day-to-day problems with executive functioning.  strength was below average bilaterally. This does not provide support for a lateralized brain dysfunction. The patient was also administered the [address], an assessment of day-to-day adaptive neurocognitive functioning abilities. Her  Total T score of 28 is within the moderately impaired range. In this regard, her time management is normal (5170 5th percentile), Money and calculation is impaired (39th percentile), communication is severely impaired (2nd percentile), and memory (2nd percentile). There is a need for day-to-day supervision in these domains.   '     The patient rated her current level of pain as \"0/5 - No Pain\" on the Gabby-Melzack Pain Questionnaire. Her Geriatric Depression Inventory score of 4 was within the normal range. Her Puente Anxiety Inventory Score of 8 reflected mild anxiety. The patient's responses on the Merle Piper Symptom Checklist did not reveal concern for additional emotional distress issues. The daughter completed the ABAS-3 and did not report concerns regarding the patient's general adaptive skills (16th percentile), conceptual skills (14th percentile, social skills, (18th percentile, or practical skills (90th percentile), on the CASE, the daughter reported concerns regarding the patient's cognitive functioning, concern for substance abuse, and OCD type concerns as well as mild anxiety. Impressions and Recommendations: This patient generated an abnormal range neurocognitive profile with respect to neurocognitive functioning. In this regard, the generated neurocognitive profile is commensurate with a moderate dementia, likely Alzheimer's type. From an emotional standpoint, there is mild anxiety and perhaps some adjustment/situational depression. There is excessive alcohol use as well. Sundowning is also reported. These issues are masked to some degree by the patient's normal range casual language skills.      In addition to continued medical care, my recommendations include a review of her current medical management medication. I also recommend consideration for appropriate treatment for attention if this is not medically contraindicated. Monitor for any worsening of mood and treat appropriately. I do believe she should abstain from alcohol consumption, and while I do not wish to be critical, we are trying to prolong quality of life here. Certainly, multiple drinks of alcohol per night are not helping her at all. Supportive counseling may prove somewhat helpful, and she does have companionship during the day. She should be encouraged to remain as mentally, socially, and physically active as possible. The patient's generated cognitive difficulties are significant to the degree whereby it is my opinion that she should not live independently without appropriate supervision for those domains pertaining to memory. This would include nutritional/meal preparation supervision, day-to-day household safety supervision, medication management supervision, and supervision of financial dealings. I do not find her competent and a POA should be assigned if this has not been done so already. She should not drive. We will need to follow her quite closely to document whether or not medication management is helpful. Prognosis is guarded. .  Baseline now established. Clinical correlation is, of course, indicated. I will discuss these findings with the patient when she follows up with me in the near future. A follow up Neuropsychological Evaluation is indicated on a prn basis. DIAGNOSES:  Alzheimer's Dementia, Late Onset, Without Behavioral Disturbance, Moderate     Anxiety and Depression - Mild     Alcohol Use Disorder, Mild     The above information is based upon information currently available to me. If there is any additional information of which I am currently unaware, I would be more than happy to review it upon having it made available to me.   Thank you for the opportunity to see this interesting individual.     Sincerely,       Patric Erickson. Juan Manuel Pfeiffer, EdS        dd  CC: Rome Mcintyre MD    Time Documentation:    73286*0 [id number]*4     45422 x 1  29481 x 7 Test Administration/Data Gathering By Technician: (3 hours). 70386 x 1 (first 30 minutes), 71511 x 7 (each additional 30 minutes)    96132 x 1  96133 x 1 Testing Evaluation Services by Neuropsychologist (1 hour, 50 minutes) 96132 x 1 (first hour), 96133 x 1 (50 minutes)    Definitions:      [id number]:  Neurobehavioral Status Exam, Clinical interview. Clinical assessment of thinking, reasoning and judgment, by neuropsychologist, both face to face time with patient and time interpreting those test results and reporting, first and subsequent hours)    57502/60429: Neuropsychological Test Administration by Technician/Psychometrist, first 30 minutes and each additional 30 minutes. The above includes: Record review. Review of history provided by patient. Review of collaborative information. Testing by Clinician. Review of raw data. Scoring. Report writing of individual tests administered by Clinician. Integration of individual tests administered by psychometrist with NSE/testing by clinician, review of records/history/collaborative information, case Conceptualization, treatment planning, clinical decision making, report writing, coordination Of Care. Psychometry test codes as time spent by psychometrist administering and scoring neurocognitive/psychological tests under supervision of neuropsychologist.  Integral services including scoring of raw data, data interpretation, case conceptualization, report writing etcetera were initiated after the patient finished testing/raw data collected and was completed on the date the report was signed.

## 2021-03-30 ENCOUNTER — OFFICE VISIT (OUTPATIENT)
Dept: NEUROLOGY | Age: 86
End: 2021-03-30
Payer: MEDICARE

## 2021-03-30 DIAGNOSIS — G30.1 LATE ONSET ALZHEIMER'S DISEASE WITHOUT BEHAVIORAL DISTURBANCE (HCC): Primary | ICD-10-CM

## 2021-03-30 DIAGNOSIS — Z86.59 HISTORY OF ANXIETY: ICD-10-CM

## 2021-03-30 DIAGNOSIS — F02.80 LATE ONSET ALZHEIMER'S DISEASE WITHOUT BEHAVIORAL DISTURBANCE (HCC): Primary | ICD-10-CM

## 2021-03-30 DIAGNOSIS — F32.A ANXIETY AND DEPRESSION: ICD-10-CM

## 2021-03-30 DIAGNOSIS — F41.9 ANXIETY AND DEPRESSION: ICD-10-CM

## 2021-03-30 DIAGNOSIS — F10.10 EXCESSIVE DRINKING ALCOHOL: ICD-10-CM

## 2021-03-30 PROCEDURE — 90832 PSYTX W PT 30 MINUTES: CPT | Performed by: CLINICAL NEUROPSYCHOLOGIST

## 2021-03-30 NOTE — PROGRESS NOTES
Prior to seeing the patient I reviewed the records, including the previously completed report, the records in [facility], and any updated visits from other providers since I saw the patient last.      Today, I engaged in a psychoeducational and supportive and cognitive/behavioral psychotherapy session with the patient and daughter. I provided psychotherapy in the form of psychoeducation and support with respect to the results of the recent Neuropsychological Evaluation, including discussing individual tests as well as patient's areas of neurocognitive strength versus weakness. We discussed, in detail, the following: This patient generated an abnormal range neurocognitive profile with respect to neurocognitive functioning. In this regard, the generated neurocognitive profile is commensurate with a moderate dementia, likely Alzheimer's type. From an emotional standpoint, there is mild anxiety and perhaps some adjustment/situational depression. There is excessive alcohol use as well. Sundowning is also reported. These issues are masked to some degree by the patient's normal range casual language skills.                 In addition to continued medical care, my recommendations include a review of her current medical management medication. I also recommend consideration for appropriate treatment for attention if this is not medically contraindicated. Monitor for any worsening of mood and treat appropriately. I do believe she should abstain from alcohol consumption, and while I do not wish to be critical, we are trying to prolong quality of life here. Certainly, multiple drinks of alcohol per night are not helping her at all. Supportive counseling may prove somewhat helpful, and she does have companionship during the day.   She should be encouraged to remain as mentally, socially, and physically active as possible.                   The patient's generated cognitive difficulties are significant to the degree whereby it is my opinion that she should not live independently without appropriate supervision for those domains pertaining to memory. This would include nutritional/meal preparation supervision, day-to-day household safety supervision, medication management supervision, and supervision of financial dealings. I do not find her competent and a POA should be assigned if this has not been done so already. She should not drive. We will need to follow her quite closely to document whether or not medication management is helpful. Prognosis is guarded. .  Baseline now established. Clinical correlation is, of course, indicated.                   I will discuss these findings with the patient when she follows up with me in the near future. A follow up Neuropsychological Evaluation is indicated on a prn basis.       DIAGNOSES:             Alzheimer's Dementia, Late Onset, Without Behavioral Disturbance, Moderate                                      Anxiety and Depression - Mild                                      Alcohol Use Disorder, Mild         Education was provided regarding my diagnostic impressions, and we discussed treatment plan/options. I also answered numerous questions related to the clinical findings, including discussing various methods to improve cognition and mood. Counseling provided regarding mood and cognition. CBT and supportive psychotherapy techniques were utilized. Supportive/Cognitive Behavioral/Solution Focused psychotherapy provided  Discussed rational versus irrational thinking patterns and their consequences. Discussed healthy/adaptive and unhealthy/maladaptive coping. The patient is on Namenda and is going to follow up with Dr. Radhika French and they have put in a request for an appointment for follow up to review Nameda. She is in a cottage right now, three bedroom, independent side. She has a car but is not driving.  She has a male  and both he and daughter have sets of keys. He provides a lot of supervision and so long as he is able to do so, I agree with the tx plan. Daughter talks to her daily. He turns 98 in a month.       The patient had the following concerns which I deferred to their referring provider: meds    Time: 20

## 2021-04-01 ENCOUNTER — OFFICE VISIT (OUTPATIENT)
Dept: INTERNAL MEDICINE CLINIC | Age: 86
End: 2021-04-01
Payer: MEDICARE

## 2021-04-01 VITALS
SYSTOLIC BLOOD PRESSURE: 138 MMHG | HEART RATE: 89 BPM | TEMPERATURE: 97.8 F | OXYGEN SATURATION: 97 % | WEIGHT: 172.4 LBS | RESPIRATION RATE: 16 BRPM | DIASTOLIC BLOOD PRESSURE: 78 MMHG | HEIGHT: 63 IN | BODY MASS INDEX: 30.55 KG/M2

## 2021-04-01 DIAGNOSIS — R26.9 GAIT DISTURBANCE: ICD-10-CM

## 2021-04-01 DIAGNOSIS — F33.9 RECURRENT DEPRESSION (HCC): ICD-10-CM

## 2021-04-01 DIAGNOSIS — E78.5 HYPERLIPIDEMIA, UNSPECIFIED HYPERLIPIDEMIA TYPE: ICD-10-CM

## 2021-04-01 DIAGNOSIS — M19.90 ARTHRITIS: ICD-10-CM

## 2021-04-01 DIAGNOSIS — G30.1 LATE ONSET ALZHEIMER'S DEMENTIA WITHOUT BEHAVIORAL DISTURBANCE (HCC): ICD-10-CM

## 2021-04-01 DIAGNOSIS — I10 ESSENTIAL HYPERTENSION: Primary | ICD-10-CM

## 2021-04-01 DIAGNOSIS — F02.80 LATE ONSET ALZHEIMER'S DEMENTIA WITHOUT BEHAVIORAL DISTURBANCE (HCC): ICD-10-CM

## 2021-04-01 LAB
ANION GAP SERPL CALC-SCNC: 5 MMOL/L (ref 5–15)
BUN SERPL-MCNC: 19 MG/DL (ref 6–20)
BUN/CREAT SERPL: 25 (ref 12–20)
CALCIUM SERPL-MCNC: 9.5 MG/DL (ref 8.5–10.1)
CHLORIDE SERPL-SCNC: 106 MMOL/L (ref 97–108)
CO2 SERPL-SCNC: 26 MMOL/L (ref 21–32)
CREAT SERPL-MCNC: 0.77 MG/DL (ref 0.55–1.02)
GLUCOSE SERPL-MCNC: 135 MG/DL (ref 65–100)
POTASSIUM SERPL-SCNC: 4.3 MMOL/L (ref 3.5–5.1)
SODIUM SERPL-SCNC: 137 MMOL/L (ref 136–145)

## 2021-04-01 PROCEDURE — 1101F PT FALLS ASSESS-DOCD LE1/YR: CPT | Performed by: INTERNAL MEDICINE

## 2021-04-01 PROCEDURE — G8399 PT W/DXA RESULTS DOCUMENT: HCPCS | Performed by: INTERNAL MEDICINE

## 2021-04-01 PROCEDURE — G8417 CALC BMI ABV UP PARAM F/U: HCPCS | Performed by: INTERNAL MEDICINE

## 2021-04-01 PROCEDURE — G8754 DIAS BP LESS 90: HCPCS | Performed by: INTERNAL MEDICINE

## 2021-04-01 PROCEDURE — G8536 NO DOC ELDER MAL SCRN: HCPCS | Performed by: INTERNAL MEDICINE

## 2021-04-01 PROCEDURE — G0463 HOSPITAL OUTPT CLINIC VISIT: HCPCS | Performed by: INTERNAL MEDICINE

## 2021-04-01 PROCEDURE — 99214 OFFICE O/P EST MOD 30 MIN: CPT | Performed by: INTERNAL MEDICINE

## 2021-04-01 PROCEDURE — G8752 SYS BP LESS 140: HCPCS | Performed by: INTERNAL MEDICINE

## 2021-04-01 PROCEDURE — G8427 DOCREV CUR MEDS BY ELIG CLIN: HCPCS | Performed by: INTERNAL MEDICINE

## 2021-04-01 PROCEDURE — 1090F PRES/ABSN URINE INCON ASSESS: CPT | Performed by: INTERNAL MEDICINE

## 2021-04-01 PROCEDURE — G9717 DOC PT DX DEP/BP F/U NT REQ: HCPCS | Performed by: INTERNAL MEDICINE

## 2021-04-01 RX ORDER — MEMANTINE HYDROCHLORIDE 28 MG/1
28 CAPSULE, EXTENDED RELEASE ORAL DAILY
Qty: 90 CAP | Refills: 1 | Status: SHIPPED | OUTPATIENT
Start: 2021-04-01 | End: 2022-04-15

## 2021-04-01 RX ORDER — LISINOPRIL 20 MG/1
TABLET ORAL
Qty: 90 TAB | Refills: 1 | Status: SHIPPED | OUTPATIENT
Start: 2021-04-01 | End: 2021-09-13

## 2021-04-01 RX ORDER — DULOXETIN HYDROCHLORIDE 60 MG/1
CAPSULE, DELAYED RELEASE ORAL
Qty: 90 CAP | Refills: 1 | Status: SHIPPED | OUTPATIENT
Start: 2021-04-01 | End: 2021-09-13

## 2021-04-01 RX ORDER — DICLOFENAC SODIUM 75 MG/1
75 TABLET, DELAYED RELEASE ORAL
Qty: 90 TAB | Refills: 1 | Status: SHIPPED | OUTPATIENT
Start: 2021-04-01 | End: 2021-09-13

## 2021-04-01 RX ORDER — SIMVASTATIN 40 MG/1
TABLET, FILM COATED ORAL
Qty: 90 TAB | Refills: 1 | Status: SHIPPED | OUTPATIENT
Start: 2021-04-01 | End: 2021-12-28

## 2021-04-01 NOTE — PROGRESS NOTES
Mandi Preciado is a 82 y.o. female who presents today for Dementia (Room 18// discuss Dr Maria R Adhikari visits ) and Difficulty Walking (balance concerns )  . She has a history of   Patient Active Problem List   Diagnosis Code    Recurrent depression ([facility] [address] F33.9    Essential hypertension I10    Hyperlipidemia E78.5    MCI (mild cognitive impairment) G31.84    Arthritis M19.90    Age-related nuclear cataract of both eyes H25.13    IFG (impaired fasting glucose) R73.01    Ductal carcinoma in situ (DCIS) of left breast D05.12    Late onset Alzheimer's dementia without behavioral disturbance (HCC) G30.1, F02.80   . Today patient is here for follow-up. .     Dementia: Patient was seen by neuropsych for repeat neuropsych testing. Results were indicative of moderate dementia. Alzheimer's type. We discussed previously alcohol cessation and safety plans. Aricept was stopped due to side effects. She has tolerated Namenda at 10 mg twice daily. We discussed changing to extended release Namenda 28 mg. Patient has drastically cut back on the amount of alcohol she is drinking. Living will and power of  have been determined. She no longer drives or controls her finances. Has BF that is with her all day. Does not do any cooking of her own. Having some shuffling feet and some concern over falls. Mood is pretty good. + cologuard: Patient did have a positive Cologuard. We did send her to GI. Since had a negative colonoscopy. Hypertension- stable. Hypertension ROS: taking medications as instructed, no medication side effects noted, no TIA's, no chest pain on exertion, no dyspnea on exertion, no swelling of ankles     reports that she has quit smoking. She has a 0.75 pack-year smoking history. She has never used smokeless tobacco.    reports current alcohol use of about 7.0 - 14.0 standard drinks of alcohol per week.    BP Readings from Last 2 Encounters:   04/01/21 138/78   01/11/21 (!) 180/70 ROS  Review of Systems   Constitutional: Negative for chills, fever and weight loss. HENT: Negative for congestion and sore throat. Eyes: Negative for blurred vision, double vision and photophobia. Respiratory: Negative for cough and shortness of breath. Cardiovascular: Negative for chest pain, palpitations and leg swelling. Gastrointestinal: Negative for abdominal pain, constipation, diarrhea, heartburn, nausea and vomiting. Genitourinary: Negative for dysuria, frequency and urgency. Musculoskeletal: Negative for back pain, joint pain, myalgias and neck pain. Skin: Negative for rash. Neurological: Negative. Negative for headaches. Endo/Heme/Allergies: Does not bruise/bleed easily. Psychiatric/Behavioral: Positive for memory loss. Negative for depression, hallucinations, substance abuse and suicidal ideas. The patient is not nervous/anxious. Visit Vitals  /78   Pulse 89   Temp 97.8 °F (36.6 °C) (Oral)   Resp 16   Ht 5' 3\" (1.6 m)   Wt 172 lb 6.4 oz (78.2 kg)   SpO2 97%   BMI 30.54 kg/m²       Physical Exam  Constitutional:       General: She is not in acute distress. Appearance: She is well-developed. HENT:      Head: Normocephalic and atraumatic. Neck:      Musculoskeletal: Normal range of motion and neck supple. Thyroid: No thyromegaly. Cardiovascular:      Rate and Rhythm: Normal rate and regular rhythm. Heart sounds: No murmur. Pulmonary:      Effort: Pulmonary effort is normal.      Breath sounds: Normal breath sounds. No wheezing. Abdominal:      General: Bowel sounds are normal. There is no distension. Palpations: Abdomen is soft. Skin:     General: Skin is warm and dry. Neurological:      Mental Status: She is alert. Cranial Nerves: No cranial nerve deficit. Comments: Responds appropriately. Not oriented to time or situation when pressed.  Language very fluid   Psychiatric:         Behavior: Behavior normal. Current Outpatient Medications   Medication Sig    memantine ER (Namenda XR) 28 mg capsule Take 1 Cap by mouth daily.  simvastatin (ZOCOR) 40 mg tablet TAKE 1 TABLET BY MOUTH EVERY NIGHT    lisinopriL (PRINIVIL, ZESTRIL) 20 mg tablet TAKE 1 TABLET BY MOUTH DAILY    DULoxetine (CYMBALTA) 60 mg capsule TAKE 1 CAPSULE DAILY    diclofenac EC (VOLTAREN) 75 mg EC tablet Take 1 Tab by mouth nightly.  melatonin 10 mg tab Take 10 mg by mouth nightly.  BIOTIN PO Take 1 Cap by mouth daily.  CYANOCOBALAMIN, VITAMIN B-12, PO Take 1 Cap by mouth daily.  multivitamin (ONE A DAY) tablet Take 1 Tab by mouth daily. No current facility-administered medications for this visit. Past Medical History:   Diagnosis Date    Anxiety     Arthritis     Cancer ([facility] [address] 06/2020    LEFT breast DCIS    Depression     Hypercholesterolemia     Hypertension     Mild cognitive impairment with memory loss       Past Surgical History:   Procedure Laterality Date    HX APPENDECTOMY  1945    HX BREAST BIOPSY Bilateral 1980's, 2020 1980s negative; 2020 positive left    HX BREAST LUMPECTOMY Left 7/14/2020    LEFT BREAST LUMPECTOMY WITH ULTRASOUND performed by Quiana Rock [facility] at [facility]    HX HYSTERECTOMY  1978    total    HX KNEE REPLACEMENT Left 2015    HX TONSILLECTOMY  1944      Social History     Tobacco Use    Smoking status: Former Smoker     Packs/day: 0.25     Years: 3.00     Pack years: 0.75    Smokeless tobacco: Never Used   Substance Use Topics    Alcohol use: Yes     Alcohol/week: 7.0 - 14.0 standard drinks     Types: 7 - 14 Glasses of wine per week      Family History   Adopted: Yes        No Known Allergies     Assessment/Plan  Diagnoses and all orders for this visit:    1. Essential hypertension-relatively stable blood pressure.  -     METABOLIC PANEL, BASIC; Future  -     lisinopriL (PRINIVIL, ZESTRIL) 20 mg tablet; TAKE 1 TABLET BY MOUTH DAILY    2.  Hyperlipidemia, unspecified hyperlipidemia type-continue statin therapy  -     simvastatin (ZOCOR) 40 mg tablet; TAKE 1 TABLET BY MOUTH EVERY NIGHT    3. Late onset Alzheimer's dementia without behavioral disturbance (HCC)-seems that her dementia is progressing. We urged her again to cut back or stop all alcohol use. Home is safe. She does have a good bit of supervision. She does not drive or control her own finances. We'll try Namenda XR.  -     memantine ER (Namenda XR) 28 mg capsule; Take 1 Cap by mouth daily. 4. Gait disturbance-concern for this leading to a fall. We'll try sending home health with PT and OT.  -     REFERRAL TO HOME HEALTH    5. Recurrent depression (HCC)-mental health and mood overall stable  -     DULoxetine (CYMBALTA) 60 mg capsule; TAKE 1 CAPSULE DAILY    6. Arthritis  -     diclofenac EC (VOLTAREN) 75 mg EC tablet; Take 1 Tab by mouth nightly. Iris Pelayo MD  4/1/2021    This note was created with the help of speech recognition software Will Rojas) and may contain some 'sound alike' errors.

## 2021-04-01 NOTE — PROGRESS NOTES
Magali Tellez  Identified pt with two pt identifiers(name and ). Chief Complaint   Patient presents with    Dementia     Room 18// discuss Dr Tim Ríos visits     Difficulty Walking     balance concerns        1. Have you been to the ER, urgent care clinic since your last visit? Hospitalized since your last visit? NO    2. Have you seen or consulted any other health care providers outside of the [address] [place] [address] since your last visit? Include any pap smears or colon screening. NO      Provider notified of reason for visit, vitals and flowsheets obtained on patients.      Patient received paperwork for advance directive during previous visit but has not completed at this time     Reviewed record In preparation for visit, huddled with provider and have obtained necessary documentation      Health Maintenance Due   Topic    Shingrix Vaccine Age 50> (1 of 2)       Wt Readings from Last 3 Encounters:   21 172 lb 6.4 oz (78.2 kg)   21 152 lb (68.9 kg)   20 152 lb (68.9 kg)     Temp Readings from Last 3 Encounters:   21 97.8 °F (36.6 °C) (Oral)   21 98 °F (36.7 °C)   20 97 °F (36.1 °C)     BP Readings from Last 3 Encounters:   21 (!) 145/72   21 (!) 180/70   20 138/80     Pulse Readings from Last 3 Encounters:   21 89   21 78   20 80     Vitals:    21 1410   BP: (!) 145/72   Pulse: 89   Resp: 16   Temp: 97.8 °F (36.6 °C)   TempSrc: Oral   SpO2: 97%   Weight: 172 lb 6.4 oz (78.2 kg)   Height: 5' 3\" (1.6 m)   PainSc:   0 - No pain         Learning Assessment:  :     Learning Assessment 2021   PRIMARY LEARNER Patient Patient   BARRIERS PRIMARY LEARNER - HEARING   CO-LEARNER CAREGIVER - Yes   CO-LEARNER NAME - daughter   PRIMARY LANGUAGE ENGLISH ENGLISH   LEARNER PREFERENCE PRIMARY DEMONSTRATION DEMONSTRATION     - LISTENING   ANSWERED BY patient patient   RELATIONSHIP SELF SELF       Depression Screening:  :     3 most recent PHQ Screens 9/18/2019   PHQ Not Done -   Little interest or pleasure in doing things Not at all   Feeling down, depressed, irritable, or hopeless Not at all   Total Score PHQ 2 0       Fall Risk Assessment:  :     Fall Risk Assessment, last 12 mths 4/1/2021   Able to walk? Yes   Fall in past 12 months? 0   Do you feel unsteady? 0   Are you worried about falling 0       Abuse Screening:  :     Abuse Screening Questionnaire 9/18/2019 6/4/2019 8/30/2018 12/26/2017   Do you ever feel afraid of your partner? N N N N   Are you in a relationship with someone who physically or mentally threatens you? N N N N   Is it safe for you to go home? Y Y Y Y       ADL Screening:  :     No flowsheet data found. Medication reconciliation up to date and corrected with patient at this time.

## 2021-04-15 ENCOUNTER — DOCUMENTATION ONLY (OUTPATIENT)
Dept: INTERNAL MEDICINE CLINIC | Age: 86
End: 2021-04-15

## 2021-04-18 DIAGNOSIS — G30.1 LATE ONSET ALZHEIMER'S DEMENTIA WITHOUT BEHAVIORAL DISTURBANCE (HCC): ICD-10-CM

## 2021-04-18 DIAGNOSIS — F02.80 LATE ONSET ALZHEIMER'S DEMENTIA WITHOUT BEHAVIORAL DISTURBANCE (HCC): ICD-10-CM

## 2021-04-18 RX ORDER — MEMANTINE HYDROCHLORIDE 28 MG/1
28 CAPSULE, EXTENDED RELEASE ORAL DAILY
Qty: 90 CAP | Refills: 1 | Status: CANCELLED | OUTPATIENT
Start: 2021-04-18

## 2021-04-19 NOTE — TELEPHONE ENCOUNTER
Namenda XR (new dose) was filled 4/1/21 to [id number] with 1 refill at time of appt.

## 2021-04-20 DIAGNOSIS — G31.84 MCI (MILD COGNITIVE IMPAIRMENT): ICD-10-CM

## 2021-04-20 RX ORDER — MEMANTINE HYDROCHLORIDE 10 MG/1
TABLET ORAL
Qty: 180 TAB | Refills: 3 | Status: SHIPPED | OUTPATIENT
Start: 2021-04-20 | End: 2022-04-04

## 2021-06-25 ENCOUNTER — OFFICE VISIT (OUTPATIENT)
Dept: INTERNAL MEDICINE CLINIC | Age: 86
End: 2021-06-25
Payer: MEDICARE

## 2021-06-25 VITALS
HEIGHT: 63 IN | WEIGHT: 169.8 LBS | TEMPERATURE: 97.8 F | HEART RATE: 82 BPM | RESPIRATION RATE: 16 BRPM | BODY MASS INDEX: 30.09 KG/M2 | DIASTOLIC BLOOD PRESSURE: 71 MMHG | OXYGEN SATURATION: 98 % | SYSTOLIC BLOOD PRESSURE: 122 MMHG

## 2021-06-25 DIAGNOSIS — M25.561 CHRONIC PAIN OF RIGHT KNEE: ICD-10-CM

## 2021-06-25 DIAGNOSIS — Z17.1 MALIGNANT NEOPLASM OF LEFT BREAST IN FEMALE, ESTROGEN RECEPTOR NEGATIVE, UNSPECIFIED SITE OF BREAST (HCC): ICD-10-CM

## 2021-06-25 DIAGNOSIS — C50.912 MALIGNANT NEOPLASM OF LEFT BREAST IN FEMALE, ESTROGEN RECEPTOR NEGATIVE, UNSPECIFIED SITE OF BREAST (HCC): ICD-10-CM

## 2021-06-25 DIAGNOSIS — I10 ESSENTIAL HYPERTENSION: ICD-10-CM

## 2021-06-25 DIAGNOSIS — M19.90 ARTHRITIS: Primary | ICD-10-CM

## 2021-06-25 DIAGNOSIS — G89.29 CHRONIC PAIN OF RIGHT KNEE: ICD-10-CM

## 2021-06-25 PROCEDURE — G8417 CALC BMI ABV UP PARAM F/U: HCPCS | Performed by: INTERNAL MEDICINE

## 2021-06-25 PROCEDURE — G8536 NO DOC ELDER MAL SCRN: HCPCS | Performed by: INTERNAL MEDICINE

## 2021-06-25 PROCEDURE — G8754 DIAS BP LESS 90: HCPCS | Performed by: INTERNAL MEDICINE

## 2021-06-25 PROCEDURE — G0463 HOSPITAL OUTPT CLINIC VISIT: HCPCS | Performed by: INTERNAL MEDICINE

## 2021-06-25 PROCEDURE — G9717 DOC PT DX DEP/BP F/U NT REQ: HCPCS | Performed by: INTERNAL MEDICINE

## 2021-06-25 PROCEDURE — G8752 SYS BP LESS 140: HCPCS | Performed by: INTERNAL MEDICINE

## 2021-06-25 PROCEDURE — 99214 OFFICE O/P EST MOD 30 MIN: CPT | Performed by: INTERNAL MEDICINE

## 2021-06-25 PROCEDURE — G8427 DOCREV CUR MEDS BY ELIG CLIN: HCPCS | Performed by: INTERNAL MEDICINE

## 2021-06-25 PROCEDURE — 1101F PT FALLS ASSESS-DOCD LE1/YR: CPT | Performed by: INTERNAL MEDICINE

## 2021-06-25 PROCEDURE — G8399 PT W/DXA RESULTS DOCUMENT: HCPCS | Performed by: INTERNAL MEDICINE

## 2021-06-25 PROCEDURE — 1090F PRES/ABSN URINE INCON ASSESS: CPT | Performed by: INTERNAL MEDICINE

## 2021-06-25 RX ORDER — DICLOFENAC SODIUM 75 MG/1
75 TABLET, DELAYED RELEASE ORAL 2 TIMES DAILY
Qty: 28 TABLET | Refills: 0 | Status: SHIPPED | OUTPATIENT
Start: 2021-06-25 | End: 2021-07-09

## 2021-06-25 NOTE — PROGRESS NOTES
Josefa Render  Identified pt with two pt identifiers(name and ). Chief Complaint   Patient presents with    Knee Pain     RM21///pt is presenting today for (R) knee pain    Leg Swelling     left foot and ankle swelling and above the knee       1. Have you been to the ER, urgent care clinic since your last visit? Hospitalized since your last visit? NO    2. Have you seen or consulted any other health care providers outside of the [address] [place] [address] since your last visit? Include any pap smears or colon screening. NO      Provider notified of reason for visit, vitals and flowsheets obtained on patients.      Patient received paperwork for advance directive during previous visit but has not completed at this time     Reviewed record In preparation for visit, huddled with provider and have obtained necessary documentation      Health Maintenance Due   Topic    Shingrix Vaccine Age 50> (1 of 2)       Wt Readings from Last 3 Encounters:   21 169 lb 12.8 oz (77 kg)   21 172 lb 6.4 oz (78.2 kg)   21 152 lb (68.9 kg)     Temp Readings from Last 3 Encounters:   21 97.8 °F (36.6 °C) (Oral)   21 97.8 °F (36.6 °C) (Oral)   21 98 °F (36.7 °C)     BP Readings from Last 3 Encounters:   21 122/71   21 138/78   21 (!) 180/70     Pulse Readings from Last 3 Encounters:   21 82   21 89   21 78     Vitals:    21 1047   BP: 122/71   Pulse: 82   Resp: 16   Temp: 97.8 °F (36.6 °C)   TempSrc: Oral   SpO2: 98%   Weight: 169 lb 12.8 oz (77 kg)   Height: 5' 3\" (1.6 m)   PainSc:   0 - No pain         Learning Assessment:  :     Learning Assessment 2021   PRIMARY LEARNER Patient Patient   BARRIERS PRIMARY LEARNER - [address] [place] [address] CAREGIVER - Yes   CO-LEARNER NAME - daughter   PRIMARY LANGUAGE ENGLISH ENGLISH   LEARNER PREFERENCE PRIMARY DEMONSTRATION DEMONSTRATION     - LISTENING   ANSWERED BY patient patient   RELATIONSHIP SELF SELF       Depression Screening:  :     3 most recent PHQ Screens 9/18/2019   PHQ Not Done -   Little interest or pleasure in doing things Not at all   Feeling down, depressed, irritable, or hopeless Not at all   Total Score PHQ 2 0       Fall Risk Assessment:  :     Fall Risk Assessment, last 12 mths 4/1/2021   Able to walk? Yes   Fall in past 12 months? 0   Do you feel unsteady? 0   Are you worried about falling 0       Abuse Screening:  :     Abuse Screening Questionnaire 9/18/2019 6/4/2019 8/30/2018 12/26/2017   Do you ever feel afraid of your partner? N N N N   Are you in a relationship with someone who physically or mentally threatens you? N N N N   Is it safe for you to go home? Y Y Y Y       ADL Screening:  :     No flowsheet data found. Medication reconciliation up to date and corrected with patient at this time.

## 2021-06-25 NOTE — PROGRESS NOTES
Mary Nevarez is a 82 y.o. female who presents today for Knee Pain (RM21///pt is presenting today for (R) knee pain) and Leg Swelling (left foot and ankle swelling and above the knee)  . She has a history of   Patient Active Problem List   Diagnosis Code    Recurrent depression ([facility] [address] F33.9    Essential hypertension I10    Hyperlipidemia E78.5    MCI (mild cognitive impairment) G31.84    Arthritis M19.90    Age-related nuclear cataract of both eyes H25.13    IFG (impaired fasting glucose) R73.01    Ductal carcinoma in situ (DCIS) of left breast D05.12    Late onset Alzheimer's dementia without behavioral disturbance (HCC) G30.1, F02.80   . Today patient is here for an acute visit. .   she does not have other concerns. History of lumpectomy in July of last year. Has healed well from this. Hypertension- borderline blood pressures at home. Hears well controlled. Patient to monitor this. Hypertension ROS: taking medications as instructed, no medication side effects noted, no TIA's, no chest pain on exertion, no dyspnea on exertion, no swelling of ankles     reports that she has quit smoking. She has a 0.75 pack-year smoking history. She has never used smokeless tobacco.    reports current alcohol use of about 7.0 - 14.0 standard drinks of alcohol per week. BP Readings from Last 2 Encounters:   06/25/21 122/71   04/01/21 138/78     Musculoskeletal pain:  She wishes to address discomfort in the Right knee at today's visit. This has been moderate in nature, gradual, starting many months ago in onset. Has a history of L TKA but no  fever, injury has been noted by the patient. Self treatment: Diclofenac daily. she does have a history of osteoarthritis joint disease. ROS  Review of Systems   Constitutional: Negative for chills, fever and weight loss. HENT: Negative for congestion and sore throat. Eyes: Negative for blurred vision, double vision and photophobia.    Respiratory: Negative for cough and shortness of breath. Cardiovascular: Negative for chest pain, palpitations and leg swelling. Gastrointestinal: Negative for abdominal pain, constipation, diarrhea, heartburn, nausea and vomiting. Genitourinary: Negative for dysuria, frequency and urgency. Musculoskeletal: Negative for joint pain and myalgias. Skin: Negative for rash. Neurological: Negative. Negative for headaches. Endo/Heme/Allergies: Does not bruise/bleed easily. Psychiatric/Behavioral: Negative for memory loss and suicidal ideas. Visit Vitals  /71 (BP 1 Location: Left upper arm, BP Patient Position: Sitting, BP Cuff Size: Adult)   Pulse 82   Temp 97.8 °F (36.6 °C) (Oral)   Resp 16   Ht 5' 3\" (1.6 m)   Wt 169 lb 12.8 oz (77 kg)   SpO2 98%   BMI 30.08 kg/m²       Physical Exam  Constitutional:       Appearance: She is well-developed. HENT:      Head: Normocephalic and atraumatic. Cardiovascular:      Rate and Rhythm: Normal rate and regular rhythm. Heart sounds: No murmur heard. Pulmonary:      Effort: Pulmonary effort is normal. No respiratory distress. Musculoskeletal:      Comments: Mild crepitus to R knee. No effusion. Skin:     General: Skin is warm and dry. Neurological:      Mental Status: She is alert and oriented to person, place, and time. Psychiatric:         Behavior: Behavior normal.           Current Outpatient Medications   Medication Sig    memantine (NAMENDA) 10 mg tablet TAKE 1 TABLET TWICE A DAY    simvastatin (ZOCOR) 40 mg tablet TAKE 1 TABLET BY MOUTH EVERY NIGHT    lisinopriL (PRINIVIL, ZESTRIL) 20 mg tablet TAKE 1 TABLET BY MOUTH DAILY    DULoxetine (CYMBALTA) 60 mg capsule TAKE 1 CAPSULE DAILY    diclofenac EC (VOLTAREN) 75 mg EC tablet Take 1 Tab by mouth nightly.  melatonin 10 mg tab Take 10 mg by mouth nightly.  BIOTIN PO Take 1 Cap by mouth daily.  CYANOCOBALAMIN, VITAMIN B-12, PO Take 1 Cap by mouth daily.     multivitamin (ONE A DAY) tablet Take 1 Tab by mouth daily.  memantine ER (Namenda XR) 28 mg capsule Take 1 Cap by mouth daily. (Patient not taking: Reported on 6/25/2021)     No current facility-administered medications for this visit. Past Medical History:   Diagnosis Date    Anxiety     Arthritis     Cancer ([address] 06/2020    LEFT breast DCIS    Depression     Hypercholesterolemia     Hypertension     Mild cognitive impairment with memory loss       Past Surgical History:   Procedure Laterality Date    HX APPENDECTOMY  1945    HX BREAST BIOPSY Bilateral 1980's, 2020 1980s negative; 2020 positive left    HX BREAST LUMPECTOMY Left 7/14/2020    LEFT BREAST LUMPECTOMY WITH ULTRASOUND performed by Jessica Hall MD at [facility]    HX HYSTERECTOMY  1978    total    HX KNEE REPLACEMENT Left 2015    HX TONSILLECTOMY  1944      Social History     Tobacco Use    Smoking status: Former Smoker     Packs/day: 0.25     Years: 3.00     Pack years: 0.75    Smokeless tobacco: Never Used   Substance Use Topics    Alcohol use: Yes     Alcohol/week: 7.0 - 14.0 standard drinks     Types: 7 - 14 Glasses of wine per week      Family History   Adopted: Yes        No Known Allergies     Assessment/Plan  Diagnoses and all orders for this visit:    1. Arthritis-acute on chronic right knee pain. Will try twice daily Voltaren for 2 weeks. If this persist would see orthopedist for considerations of injection.  -     REFERRAL TO ORTHOPEDICS    2. Chronic pain of right knee  -     REFERRAL TO ORTHOPEDICS  -     diclofenac EC (VOLTAREN) 75 mg EC tablet; Take 1 Tablet by mouth two (2) times a day for 14 days. 3. Essential hypertension-borderline at home. Continue to monitor    4. Malignant neoplasm of left breast in female, estrogen receptor negative, unspecified site of breast (HCC)-has healed well from her lumpectomy.             Netta Jo MD  6/25/2021    This note was created with the help of speech recognition software Ayesha) and may contain some 'sound alike' errors.

## 2021-09-13 DIAGNOSIS — I10 ESSENTIAL HYPERTENSION: ICD-10-CM

## 2021-09-13 DIAGNOSIS — M19.90 ARTHRITIS: ICD-10-CM

## 2021-09-13 DIAGNOSIS — F33.9 RECURRENT DEPRESSION (HCC): ICD-10-CM

## 2021-09-13 RX ORDER — DICLOFENAC SODIUM 75 MG/1
TABLET, DELAYED RELEASE ORAL
Qty: 90 TABLET | Refills: 3 | Status: SHIPPED | OUTPATIENT
Start: 2021-09-13 | End: 2022-10-30

## 2021-09-13 RX ORDER — DULOXETIN HYDROCHLORIDE 60 MG/1
CAPSULE, DELAYED RELEASE ORAL
Qty: 90 CAPSULE | Refills: 3 | Status: SHIPPED | OUTPATIENT
Start: 2021-09-13

## 2021-09-13 RX ORDER — LISINOPRIL 20 MG/1
TABLET ORAL
Qty: 90 TABLET | Refills: 3 | Status: SHIPPED | OUTPATIENT
Start: 2021-09-13 | End: 2022-10-30

## 2021-12-28 DIAGNOSIS — E78.5 HYPERLIPIDEMIA, UNSPECIFIED HYPERLIPIDEMIA TYPE: ICD-10-CM

## 2021-12-28 RX ORDER — SIMVASTATIN 40 MG/1
TABLET, FILM COATED ORAL
Qty: 90 TABLET | Refills: 3 | Status: SHIPPED | OUTPATIENT
Start: 2021-12-28

## 2022-03-18 PROBLEM — F33.9 RECURRENT DEPRESSION (HCC): Status: ACTIVE | Noted: 2017-12-26

## 2022-03-18 PROBLEM — I10 ESSENTIAL HYPERTENSION: Status: ACTIVE | Noted: 2017-12-26

## 2022-03-19 PROBLEM — F02.80 LATE ONSET ALZHEIMER'S DEMENTIA WITHOUT BEHAVIORAL DISTURBANCE (HCC): Status: ACTIVE | Noted: 2021-04-01

## 2022-03-19 PROBLEM — R73.01 IFG (IMPAIRED FASTING GLUCOSE): Status: ACTIVE | Noted: 2019-09-21

## 2022-03-19 PROBLEM — G31.84 MCI (MILD COGNITIVE IMPAIRMENT): Status: ACTIVE | Noted: 2018-05-30

## 2022-03-19 PROBLEM — G30.1 LATE ONSET ALZHEIMER'S DEMENTIA WITHOUT BEHAVIORAL DISTURBANCE (HCC): Status: ACTIVE | Noted: 2021-04-01

## 2022-03-19 PROBLEM — M19.90 ARTHRITIS: Status: ACTIVE | Noted: 2018-05-30

## 2022-03-20 PROBLEM — D05.12 DUCTAL CARCINOMA IN SITU (DCIS) OF LEFT BREAST: Status: ACTIVE | Noted: 2020-06-18

## 2022-03-20 PROBLEM — E78.5 HYPERLIPIDEMIA: Status: ACTIVE | Noted: 2017-12-26

## 2022-03-20 PROBLEM — H25.13 AGE-RELATED NUCLEAR CATARACT OF BOTH EYES: Status: ACTIVE | Noted: 2018-05-01

## 2022-04-04 DIAGNOSIS — G31.84 MCI (MILD COGNITIVE IMPAIRMENT): ICD-10-CM

## 2022-04-04 RX ORDER — MEMANTINE HYDROCHLORIDE 10 MG/1
TABLET ORAL
Qty: 180 TABLET | Refills: 3 | Status: SHIPPED | OUTPATIENT
Start: 2022-04-04

## 2022-04-15 ENCOUNTER — OFFICE VISIT (OUTPATIENT)
Dept: INTERNAL MEDICINE CLINIC | Age: 87
End: 2022-04-15
Payer: MEDICARE

## 2022-04-15 VITALS
SYSTOLIC BLOOD PRESSURE: 134 MMHG | OXYGEN SATURATION: 99 % | TEMPERATURE: 98 F | RESPIRATION RATE: 14 BRPM | HEIGHT: 63 IN | DIASTOLIC BLOOD PRESSURE: 70 MMHG | WEIGHT: 168 LBS | BODY MASS INDEX: 29.77 KG/M2 | HEART RATE: 71 BPM

## 2022-04-15 DIAGNOSIS — G30.1 LATE ONSET ALZHEIMER'S DEMENTIA WITHOUT BEHAVIORAL DISTURBANCE (HCC): Primary | ICD-10-CM

## 2022-04-15 DIAGNOSIS — F33.9 RECURRENT DEPRESSION (HCC): ICD-10-CM

## 2022-04-15 DIAGNOSIS — F02.80 LATE ONSET ALZHEIMER'S DEMENTIA WITHOUT BEHAVIORAL DISTURBANCE (HCC): Primary | ICD-10-CM

## 2022-04-15 DIAGNOSIS — R73.01 IFG (IMPAIRED FASTING GLUCOSE): ICD-10-CM

## 2022-04-15 DIAGNOSIS — M25.561 CHRONIC PAIN OF RIGHT KNEE: ICD-10-CM

## 2022-04-15 DIAGNOSIS — C50.912 MALIGNANT NEOPLASM OF LEFT BREAST IN FEMALE, ESTROGEN RECEPTOR NEGATIVE, UNSPECIFIED SITE OF BREAST (HCC): ICD-10-CM

## 2022-04-15 DIAGNOSIS — Z17.1 MALIGNANT NEOPLASM OF LEFT BREAST IN FEMALE, ESTROGEN RECEPTOR NEGATIVE, UNSPECIFIED SITE OF BREAST (HCC): ICD-10-CM

## 2022-04-15 DIAGNOSIS — R26.9 GAIT DISTURBANCE: ICD-10-CM

## 2022-04-15 DIAGNOSIS — G89.29 CHRONIC PAIN OF RIGHT KNEE: ICD-10-CM

## 2022-04-15 DIAGNOSIS — I10 ESSENTIAL HYPERTENSION: ICD-10-CM

## 2022-04-15 DIAGNOSIS — E78.5 HYPERLIPIDEMIA, UNSPECIFIED HYPERLIPIDEMIA TYPE: ICD-10-CM

## 2022-04-15 PROCEDURE — G8417 CALC BMI ABV UP PARAM F/U: HCPCS | Performed by: INTERNAL MEDICINE

## 2022-04-15 PROCEDURE — 1090F PRES/ABSN URINE INCON ASSESS: CPT | Performed by: INTERNAL MEDICINE

## 2022-04-15 PROCEDURE — G8754 DIAS BP LESS 90: HCPCS | Performed by: INTERNAL MEDICINE

## 2022-04-15 PROCEDURE — G8536 NO DOC ELDER MAL SCRN: HCPCS | Performed by: INTERNAL MEDICINE

## 2022-04-15 PROCEDURE — 99214 OFFICE O/P EST MOD 30 MIN: CPT | Performed by: INTERNAL MEDICINE

## 2022-04-15 PROCEDURE — G8399 PT W/DXA RESULTS DOCUMENT: HCPCS | Performed by: INTERNAL MEDICINE

## 2022-04-15 PROCEDURE — 1101F PT FALLS ASSESS-DOCD LE1/YR: CPT | Performed by: INTERNAL MEDICINE

## 2022-04-15 PROCEDURE — G0463 HOSPITAL OUTPT CLINIC VISIT: HCPCS | Performed by: INTERNAL MEDICINE

## 2022-04-15 PROCEDURE — G8427 DOCREV CUR MEDS BY ELIG CLIN: HCPCS | Performed by: INTERNAL MEDICINE

## 2022-04-15 PROCEDURE — G8752 SYS BP LESS 140: HCPCS | Performed by: INTERNAL MEDICINE

## 2022-04-15 PROCEDURE — G9717 DOC PT DX DEP/BP F/U NT REQ: HCPCS | Performed by: INTERNAL MEDICINE

## 2022-04-15 RX ORDER — FAMOTIDINE 20 MG/1
20 TABLET, FILM COATED ORAL DAILY
Qty: 90 TABLET | Refills: 1 | Status: SHIPPED | OUTPATIENT
Start: 2022-04-15 | End: 2022-09-26

## 2022-04-15 NOTE — PROGRESS NOTES
Kishore Archer is a 82 y.o. female who presents today for Follow-up (Got another injected in her knee - having trouble waling with cane - )  . She has a history of   Patient Active Problem List   Diagnosis Code    Recurrent depression ([facility] [address] F33.9    Essential hypertension I10    Hyperlipidemia E78.5    MCI (mild cognitive impairment) G31.84    Arthritis M19.90    Age-related nuclear cataract of both eyes H25.13    IFG (impaired fasting glucose) R73.01    Ductal carcinoma in situ (DCIS) of left breast D05.12    Late onset Alzheimer's dementia without behavioral disturbance (HCC) G30.1, F02.80   . Today patient is here for follow-up. Has seen the orthopedist for her knee. Overall she believes that she is doing ok. Deconditioning has become an issue again. She did very well with [facility] last year but since has been less active. Daughter worries that she will sustain a fall. Hypertension  Hypertension ROS: taking medications as instructed, no medication side effects noted, no TIA's, no chest pain on exertion, no dyspnea on exertion, no swelling of ankles     reports that she has quit smoking. She has a 0.75 pack-year smoking history. She has never used smokeless tobacco.    reports current alcohol use of about 7.0 - 14.0 standard drinks of alcohol per week. BP Readings from Last 2 Encounters:   04/15/22 134/70   06/25/21 122/71     Did have a positive Cologuard. C-scope was done and was negative. Did have a lumpectomy in 2020. Overall doing well. Dementia has slowly continued to progress. Denies any safety issue at this time even though she is living alone. She does have someone with her virtually all day long. ROS  Review of Systems   Constitutional: Negative for chills, fever and weight loss. HENT: Negative for congestion and sore throat. Eyes: Negative for blurred vision, double vision and photophobia.    Respiratory: Negative for cough, hemoptysis, sputum production and shortness of breath. Cardiovascular: Negative for chest pain, palpitations and leg swelling. Gastrointestinal: Negative for abdominal pain, constipation, diarrhea, heartburn, nausea and vomiting. Genitourinary: Negative for dysuria, frequency and urgency. Musculoskeletal: Positive for back pain and joint pain. Negative for myalgias. Skin: Negative for rash. Neurological: Negative. Negative for headaches. More deconditioned. Endo/Heme/Allergies: Does not bruise/bleed easily. Psychiatric/Behavioral: Positive for memory loss. Negative for depression and suicidal ideas. The patient is not nervous/anxious and does not have insomnia. Visit Vitals  /70   Pulse 71   Temp 98 °F (36.7 °C) (Oral)   Resp 14   Ht 5' 3\" (1.6 m)   Wt 168 lb (76.2 kg)   SpO2 99%   BMI 29.76 kg/m²       Physical Exam  Constitutional:       General: She is not in acute distress. Appearance: She is well-developed. HENT:      Head: Normocephalic and atraumatic. Right Ear: Tympanic membrane normal.      Left Ear: Tympanic membrane normal.      Nose: Nose normal.   Neck:      Thyroid: No thyromegaly. Cardiovascular:      Rate and Rhythm: Normal rate and regular rhythm. Heart sounds: No murmur heard. Pulmonary:      Effort: Pulmonary effort is normal.      Breath sounds: Normal breath sounds. No wheezing. Abdominal:      General: Bowel sounds are normal. There is no distension. Palpations: Abdomen is soft. Musculoskeletal:      Cervical back: Normal range of motion and neck supple. Skin:     General: Skin is warm and dry. Neurological:      Mental Status: She is alert and oriented to person, place, and time. Cranial Nerves: No cranial nerve deficit.    Psychiatric:         Behavior: Behavior normal.           Current Outpatient Medications   Medication Sig    memantine (NAMENDA) 10 mg tablet TAKE 1 TABLET TWICE A DAY    simvastatin (ZOCOR) 40 mg tablet TAKE 1 TABLET EVERY NIGHT    diclofenac EC (VOLTAREN) 75 mg EC tablet TAKE 1 TABLET NIGHTLY    DULoxetine (CYMBALTA) 60 mg capsule TAKE 1 CAPSULE DAILY    lisinopriL (PRINIVIL, ZESTRIL) 20 mg tablet TAKE 1 TABLET DAILY    melatonin 10 mg tab Take 10 mg by mouth nightly.  BIOTIN PO Take 1 Cap by mouth daily.  CYANOCOBALAMIN, VITAMIN B-12, PO Take 1 Cap by mouth daily.  multivitamin (ONE A DAY) tablet Take 1 Tab by mouth daily.  memantine ER (Namenda XR) 28 mg capsule Take 1 Cap by mouth daily. (Patient not taking: Reported on 6/25/2021)     No current facility-administered medications for this visit. Past Medical History:   Diagnosis Date    Anxiety     Arthritis     Cancer ([facility] [address] 06/2020    LEFT breast DCIS    Depression     Hypercholesterolemia     Hypertension     Mild cognitive impairment with memory loss       Past Surgical History:   Procedure Laterality Date    HX APPENDECTOMY  1945    HX BREAST BIOPSY Bilateral 1980's, 2020 1980s negative; 2020 positive left    HX BREAST LUMPECTOMY Left 7/14/2020    LEFT BREAST LUMPECTOMY WITH ULTRASOUND performed by Jessica Cai [facility] at [facility]    HX HYSTERECTOMY  1978    total    HX KNEE REPLACEMENT Left 2015    HX TONSILLECTOMY  1944      Social History     Tobacco Use    Smoking status: Former Smoker     Packs/day: 0.25     Years: 3.00     Pack years: 0.75    Smokeless tobacco: Never Used   Substance Use Topics    Alcohol use: Yes     Alcohol/week: 7.0 - 14.0 standard drinks     Types: 7 - 14 Glasses of wine per week      Family History   Adopted: Yes        No Known Allergies     Assessment/Plan  Diagnoses and all orders for this visit:    1. Late onset Alzheimer's dementia without behavioral disturbance (HCC)-slowly progressive. Patient does have a lot of contact with others and home is safe. Her balance and deconditioned state are bigger risk for her to fall.   We will try to get home health or physical therapy to come see her again. She had done well with [facility] in the past.  This will help her gain range of motion and decrease her risk for fall. 2. Hyperlipidemia, unspecified hyperlipidemia type-continue current therapy check levels today   -     LIPID PANEL; Future  -     CBC WITH AUTOMATED DIFF; Future    3. Recurrent depression (HCC)-overall mood is stable. 4. Essential hypertension-repeat blood pressure looks good  -     METABOLIC PANEL, COMPREHENSIVE; Future  -     LIPID PANEL; Future  -     famotidine (PEPCID) 20 mg tablet; Take 1 Tablet by mouth daily. 5. IFG (impaired fasting glucose)  -     HEMOGLOBIN A1C WITH EAG; Future    6. Malignant neoplasm of left breast in female, estrogen receptor negative, unspecified site of breast (HCC)-status post lumpectomy. 7. Gait disturbance  -     REFERRAL TO PHYSICAL THERAPY    8. Chronic pain of right knee-did receive an injection recently. This has helped. Patient does take about once daily NSAID. She should be on an H2 blocker to protect her stomach. Will prescribe her this. -     REFERRAL TO PHYSICAL THERAPY            Higinio De Paz MD  4/15/2022    This note was created with the help of speech recognition software Janeth Leyva) and may contain some 'sound alike' errors.

## 2022-04-16 LAB
ALBUMIN SERPL-MCNC: 4.3 G/DL (ref 3.5–5)
ALBUMIN/GLOB SERPL: 1.3 {RATIO} (ref 1.1–2.2)
ALP SERPL-CCNC: 80 U/L (ref 45–117)
ALT SERPL-CCNC: 33 U/L (ref 12–78)
ANION GAP SERPL CALC-SCNC: 6 MMOL/L (ref 5–15)
AST SERPL-CCNC: 15 U/L (ref 15–37)
BASOPHILS # BLD: 0 K/UL (ref 0–0.1)
BASOPHILS NFR BLD: 0 % (ref 0–1)
BILIRUB SERPL-MCNC: 0.4 MG/DL (ref 0.2–1)
BUN SERPL-MCNC: 30 MG/DL (ref 6–20)
BUN/CREAT SERPL: 31 (ref 12–20)
CALCIUM SERPL-MCNC: 9.6 MG/DL (ref 8.5–10.1)
CHLORIDE SERPL-SCNC: 104 MMOL/L (ref 97–108)
CHOLEST SERPL-MCNC: 195 MG/DL
CO2 SERPL-SCNC: 26 MMOL/L (ref 21–32)
CREAT SERPL-MCNC: 0.97 MG/DL (ref 0.55–1.02)
DIFFERENTIAL METHOD BLD: ABNORMAL
EOSINOPHIL # BLD: 0 K/UL (ref 0–0.4)
EOSINOPHIL NFR BLD: 0 % (ref 0–7)
ERYTHROCYTE [DISTWIDTH] IN BLOOD BY AUTOMATED COUNT: 13.1 % (ref 11.5–14.5)
EST. AVERAGE GLUCOSE BLD GHB EST-MCNC: 151 MG/DL
GLOBULIN SER CALC-MCNC: 3.3 G/DL (ref 2–4)
GLUCOSE SERPL-MCNC: 147 MG/DL (ref 65–100)
HBA1C MFR BLD: 6.9 % (ref 4–5.6)
HCT VFR BLD AUTO: 40.4 % (ref 35–47)
HDLC SERPL-MCNC: 52 MG/DL
HDLC SERPL: 3.8 {RATIO} (ref 0–5)
HGB BLD-MCNC: 13.3 G/DL (ref 11.5–16)
IMM GRANULOCYTES # BLD AUTO: 0 K/UL (ref 0–0.04)
IMM GRANULOCYTES NFR BLD AUTO: 0 % (ref 0–0.5)
LDLC SERPL CALC-MCNC: 116.4 MG/DL (ref 0–100)
LYMPHOCYTES # BLD: 0.6 K/UL (ref 0.8–3.5)
LYMPHOCYTES NFR BLD: 6 % (ref 12–49)
MCH RBC QN AUTO: 31.1 PG (ref 26–34)
MCHC RBC AUTO-ENTMCNC: 32.9 G/DL (ref 30–36.5)
MCV RBC AUTO: 94.6 FL (ref 80–99)
MONOCYTES # BLD: 0.7 K/UL (ref 0–1)
MONOCYTES NFR BLD: 7 % (ref 5–13)
NEUTS SEG # BLD: 8.5 K/UL (ref 1.8–8)
NEUTS SEG NFR BLD: 87 % (ref 32–75)
NRBC # BLD: 0 K/UL (ref 0–0.01)
NRBC BLD-RTO: 0 PER 100 WBC
PLATELET # BLD AUTO: 279 K/UL (ref 150–400)
PMV BLD AUTO: 10.6 FL (ref 8.9–12.9)
POTASSIUM SERPL-SCNC: 4.5 MMOL/L (ref 3.5–5.1)
PROT SERPL-MCNC: 7.6 G/DL (ref 6.4–8.2)
RBC # BLD AUTO: 4.27 M/UL (ref 3.8–5.2)
RBC MORPH BLD: ABNORMAL
SODIUM SERPL-SCNC: 136 MMOL/L (ref 136–145)
TRIGL SERPL-MCNC: 133 MG/DL (ref ?–150)
VLDLC SERPL CALC-MCNC: 26.6 MG/DL
WBC # BLD AUTO: 9.8 K/UL (ref 3.6–11)
WBC MORPH BLD: ABNORMAL

## 2022-04-25 ENCOUNTER — TELEPHONE (OUTPATIENT)
Dept: INTERNAL MEDICINE CLINIC | Age: 87
End: 2022-04-25

## 2022-04-25 NOTE — TELEPHONE ENCOUNTER
PT/OT request with ov notes, and insurance information was faxed to [facility] to request eval and treat

## 2022-09-26 DIAGNOSIS — I10 ESSENTIAL HYPERTENSION: ICD-10-CM

## 2022-09-26 RX ORDER — FAMOTIDINE 20 MG/1
TABLET, FILM COATED ORAL
Qty: 90 TABLET | Refills: 3 | Status: SHIPPED | OUTPATIENT
Start: 2022-09-26

## 2022-10-29 DIAGNOSIS — I10 ESSENTIAL HYPERTENSION: ICD-10-CM

## 2022-10-29 DIAGNOSIS — M19.90 ARTHRITIS: ICD-10-CM

## 2022-10-30 RX ORDER — LISINOPRIL 20 MG/1
TABLET ORAL
Qty: 90 TABLET | Refills: 3 | Status: SHIPPED | OUTPATIENT
Start: 2022-10-30

## 2022-10-30 RX ORDER — DICLOFENAC SODIUM 75 MG/1
TABLET, DELAYED RELEASE ORAL
Qty: 90 TABLET | Refills: 3 | Status: SHIPPED | OUTPATIENT
Start: 2022-10-30

## 2022-11-02 ENCOUNTER — OFFICE VISIT (OUTPATIENT)
Dept: INTERNAL MEDICINE CLINIC | Age: 87
End: 2022-11-02
Payer: MEDICARE

## 2022-11-02 VITALS
DIASTOLIC BLOOD PRESSURE: 72 MMHG | BODY MASS INDEX: 29.77 KG/M2 | WEIGHT: 168 LBS | SYSTOLIC BLOOD PRESSURE: 122 MMHG | HEIGHT: 63 IN | OXYGEN SATURATION: 97 % | RESPIRATION RATE: 12 BRPM | TEMPERATURE: 97.6 F | HEART RATE: 80 BPM

## 2022-11-02 DIAGNOSIS — F33.9 RECURRENT DEPRESSION (HCC): ICD-10-CM

## 2022-11-02 DIAGNOSIS — I10 ESSENTIAL HYPERTENSION: Primary | ICD-10-CM

## 2022-11-02 DIAGNOSIS — Z12.31 SCREENING MAMMOGRAM FOR BREAST CANCER: ICD-10-CM

## 2022-11-02 DIAGNOSIS — G30.1 LATE ONSET ALZHEIMER'S DEMENTIA WITHOUT BEHAVIORAL DISTURBANCE (HCC): ICD-10-CM

## 2022-11-02 DIAGNOSIS — R73.01 IFG (IMPAIRED FASTING GLUCOSE): ICD-10-CM

## 2022-11-02 DIAGNOSIS — F02.80 LATE ONSET ALZHEIMER'S DEMENTIA WITHOUT BEHAVIORAL DISTURBANCE (HCC): ICD-10-CM

## 2022-11-02 DIAGNOSIS — Z17.1 MALIGNANT NEOPLASM OF LEFT BREAST IN FEMALE, ESTROGEN RECEPTOR NEGATIVE, UNSPECIFIED SITE OF BREAST (HCC): ICD-10-CM

## 2022-11-02 DIAGNOSIS — C50.912 MALIGNANT NEOPLASM OF LEFT BREAST IN FEMALE, ESTROGEN RECEPTOR NEGATIVE, UNSPECIFIED SITE OF BREAST (HCC): ICD-10-CM

## 2022-11-02 DIAGNOSIS — M19.90 ARTHRITIS: ICD-10-CM

## 2022-11-02 PROCEDURE — G9717 DOC PT DX DEP/BP F/U NT REQ: HCPCS | Performed by: INTERNAL MEDICINE

## 2022-11-02 PROCEDURE — G0463 HOSPITAL OUTPT CLINIC VISIT: HCPCS | Performed by: INTERNAL MEDICINE

## 2022-11-02 PROCEDURE — 99214 OFFICE O/P EST MOD 30 MIN: CPT | Performed by: INTERNAL MEDICINE

## 2022-11-02 PROCEDURE — 1101F PT FALLS ASSESS-DOCD LE1/YR: CPT | Performed by: INTERNAL MEDICINE

## 2022-11-02 PROCEDURE — G8752 SYS BP LESS 140: HCPCS | Performed by: INTERNAL MEDICINE

## 2022-11-02 PROCEDURE — G8417 CALC BMI ABV UP PARAM F/U: HCPCS | Performed by: INTERNAL MEDICINE

## 2022-11-02 PROCEDURE — G8754 DIAS BP LESS 90: HCPCS | Performed by: INTERNAL MEDICINE

## 2022-11-02 PROCEDURE — G8536 NO DOC ELDER MAL SCRN: HCPCS | Performed by: INTERNAL MEDICINE

## 2022-11-02 PROCEDURE — 1090F PRES/ABSN URINE INCON ASSESS: CPT | Performed by: INTERNAL MEDICINE

## 2022-11-02 PROCEDURE — G8427 DOCREV CUR MEDS BY ELIG CLIN: HCPCS | Performed by: INTERNAL MEDICINE

## 2022-11-02 PROCEDURE — G8399 PT W/DXA RESULTS DOCUMENT: HCPCS | Performed by: INTERNAL MEDICINE

## 2022-11-02 NOTE — PROGRESS NOTES
Ebenezer Yanez  Identified pt with two pt identifiers(name and ). Chief Complaint   Patient presents with    Follow-up     RM19// pt presenting today for routine medication chk        1. Have you been to the ER, urgent care clinic since your last visit? Hospitalized since your last visit? NO    2. Have you seen or consulted any other health care providers outside of the [address] [place] [address] since your last visit? Include any pap smears or colon screening. NO      Provider notified of reason for visit, vitals and flowsheets obtained on patients.      Patient received paperwork for advance directive during previous visit but has not completed at this time     Reviewed record In preparation for visit, huddled with provider and have obtained necessary documentation      Health Maintenance Due   Topic    Shingrix Vaccine Age 50> (1 of 2)    Medicare Yearly Exam     COVID-19 Vaccine (5 - Booster for [employer] series)       Wt Readings from Last 3 Encounters:   22 168 lb (76.2 kg)   04/15/22 168 lb (76.2 kg)   21 169 lb 12.8 oz (77 kg)     Temp Readings from Last 3 Encounters:   22 97.6 °F (36.4 °C) (Oral)   04/15/22 98 °F (36.7 °C) (Oral)   21 97.8 °F (36.6 °C) (Oral)     BP Readings from Last 3 Encounters:   22 (!) 156/79   04/15/22 134/70   21 122/71     Pulse Readings from Last 3 Encounters:   22 80   04/15/22 71   21 82     Vitals:    22 1118   BP: (!) 156/79   Pulse: 80   Resp: 12   Temp: 97.6 °F (36.4 °C)   TempSrc: Oral   SpO2: 97%   Weight: 168 lb (76.2 kg)   Height: 5' 3\" (1.6 m)   PainSc:   0 - No pain         Learning Assessment:  :     Learning Assessment 2021   PRIMARY LEARNER Patient Patient   BARRIERS PRIMARY LEARNER - HEARING   CO-LEARNER CAREGIVER - Yes   CO-LEARNER NAME - daughter   PRIMARY LANGUAGE ENGLISH ENGLISH   LEARNER PREFERENCE PRIMARY DEMONSTRATION DEMONSTRATION     - LISTENING   ANSWERED BY patient patient RELATIONSHIP SELF SELF       Depression Screening:  :     3 most recent PHQ Screens 4/15/2022   PHQ Not Done -   Little interest or pleasure in doing things Not at all   Feeling down, depressed, irritable, or hopeless Not at all   Total Score PHQ 2 0       Fall Risk Assessment:  :     Fall Risk Assessment, last 12 mths 4/1/2021   Able to walk? Yes   Fall in past 12 months? 0   Do you feel unsteady? 0   Are you worried about falling 0       Abuse Screening:  :     Abuse Screening Questionnaire 9/18/2019 6/4/2019 8/30/2018 12/26/2017   Do you ever feel afraid of your partner? N N N N   Are you in a relationship with someone who physically or mentally threatens you? N N N N   Is it safe for you to go home? Y Y Y Y       ADL Screening:  :     No flowsheet data found. Medication reconciliation up to date and corrected with patient at this time.

## 2022-11-02 NOTE — PROGRESS NOTES
Marianne Singh is a 82 y.o. female who presents today for Follow-up (RM19// pt presenting today for routine medication chk )  . She has a history of   Patient Active Problem List   Diagnosis Code    [facility] F33.9    Essential hypertension I10    Hyperlipidemia E78.5    MCI (mild cognitive impairment) G31.84    Arthritis M19.90    Age-related nuclear cataract of both eyes H25.13    IFG (impaired fasting glucose) R73.01    Ductal carcinoma in situ (DCIS) of left breast D05.12    Late onset Alzheimer's dementia without behavioral disturbance (HCC) G30.1, F02.80   . Today patient is here for follow-up. Does see ortho and Synvisc did help some. On PRN diclofenac. Dementia: Overall family reports that her dementia has been progressing. Mood overall has been stable. Does have a good bit of help and has a friend that sits with her most days. She has wandered off twice, but this has not happened regularly. Has not had any recent falls. IFG/DM: repeat A1C today. Weight is stable. Hypertension- repeat much better. Hypertension ROS: taking medications as instructed, no medication side effects noted, no TIA's, no chest pain on exertion, no dyspnea on exertion, no swelling of ankles     reports that she has quit smoking. Her smoking use included cigarettes. She has a 0.75 pack-year smoking history. She has never used smokeless tobacco.    reports current alcohol use of about 2.0 standard drinks per week. BP Readings from Last 2 Encounters:   11/02/22 122/72   04/15/22 134/70     ROS  Review of Systems   Constitutional:  Negative for chills, fever and weight loss. HENT:  Negative for congestion and sore throat. Eyes:  Negative for blurred vision, double vision and photophobia. Respiratory:  Negative for cough and shortness of breath. Cardiovascular:  Negative for chest pain, palpitations and leg swelling.    Gastrointestinal:  Negative for abdominal pain, constipation, diarrhea, heartburn, nausea and vomiting. Genitourinary:  Negative for dysuria, frequency and urgency. Musculoskeletal:  Negative for joint pain and myalgias. Skin:  Negative for rash. Neurological: Negative. Negative for headaches. Endo/Heme/Allergies:  Does not bruise/bleed easily. Psychiatric/Behavioral:  Positive for memory loss. Negative for suicidal ideas. Visit Vitals  /72   Pulse 80   Temp 97.6 °F (36.4 °C) (Oral)   Resp 12   Ht 5' 3\" (1.6 m)   Wt 168 lb (76.2 kg)   SpO2 97%   BMI 29.76 kg/m²       Physical Exam  Constitutional:       Appearance: She is well-developed. HENT:      Head: Normocephalic and atraumatic. Right Ear: Tympanic membrane normal.      Left Ear: Tympanic membrane normal.   Cardiovascular:      Rate and Rhythm: Normal rate and regular rhythm. Heart sounds: No murmur heard. Pulmonary:      Effort: Pulmonary effort is normal. No respiratory distress. Skin:     General: Skin is warm and dry. Neurological:      Mental Status: She is alert. Comments: Oriented to person/season. Not to date. Psychiatric:         Behavior: Behavior normal.         Current Outpatient Medications   Medication Sig    lisinopriL (PRINIVIL, ZESTRIL) 20 mg tablet TAKE 1 TABLET DAILY    diclofenac EC (VOLTAREN) 75 mg EC tablet TAKE 1 TABLET NIGHTLY    famotidine (PEPCID) 20 mg tablet TAKE 1 TABLET DAILY    memantine (NAMENDA) 10 mg tablet TAKE 1 TABLET TWICE A DAY    simvastatin (ZOCOR) 40 mg tablet TAKE 1 TABLET EVERY NIGHT    DULoxetine (CYMBALTA) 60 mg capsule TAKE 1 CAPSULE DAILY    melatonin 10 mg tab Take 10 mg by mouth nightly. BIOTIN PO Take 1 Cap by mouth daily. CYANOCOBALAMIN, VITAMIN B-12, PO Take 1 Cap by mouth daily. multivitamin (ONE A DAY) tablet Take 1 Tab by mouth daily. No current facility-administered medications for this visit.         Past Medical History:   Diagnosis Date    Anxiety     Arthritis     Cancer ([facility] [address] 06/2020    LEFT breast DCIS    Depression     Hypercholesterolemia     Hypertension     Mild cognitive impairment with memory loss       Past Surgical History:   Procedure Laterality Date    HX APPENDECTOMY  1945    HX BREAST BIOPSY Bilateral 1980's, 2020    1980s negative; 2020 positive left    HX BREAST LUMPECTOMY Left 7/14/2020    LEFT BREAST LUMPECTOMY WITH ULTRASOUND performed by Bambi Szymanski MD at [address] [place] [address]    total    HX KNEE REPLACEMENT Left 2015    HX TONSILLECTOMY  1944      Social History     Tobacco Use    Smoking status: Former     Packs/day: 0.25     Years: 3.00     Pack years: 0.75     Types: Cigarettes    Smokeless tobacco: Never   Substance Use Topics    Alcohol use: Yes     Alcohol/week: 2.0 standard drinks     Types: 2 Glasses of wine per week      Family History   Adopted: Yes        No Known Allergies     Assessment/Plan  Diagnoses and all orders for this visit:    1. Essential hypertension-pressure stable  -     CBC WITH AUTOMATED DIFF; Future  -     METABOLIC PANEL, COMPREHENSIVE; Future    2. Recurrent depression ([facility] [address] -mood has been very good. Continue current therapy. 3. IFG (impaired fasting glucose)-repeat  -     HEMOGLOBIN A1C WITH EAG; Future    4. Late onset Alzheimer's dementia without behavioral disturbance (HCC)-progressing but home life is safe. Family is very involved in her care. 5. Arthritis-seeing orthopedist.  Overall stable    6. Malignant neoplasm of left breast in female, estrogen receptor negative, unspecified site of breast (HCC)-we will continue breast cancer screening    7. Screening mammogram for breast cancer  -     NIKITA MAMMO BI SCREENING INCL CAD; Future          Jacqulyne Kanner, MD  11/2/2022    This note was created with the help of speech recognition software Abdullahi Lafleur) and may contain some 'sound alike' errors.

## 2022-11-03 LAB
ALBUMIN SERPL-MCNC: 4.1 G/DL (ref 3.5–5)
ALBUMIN/GLOB SERPL: 1.3 {RATIO} (ref 1.1–2.2)
ALP SERPL-CCNC: 82 U/L (ref 45–117)
ALT SERPL-CCNC: 30 U/L (ref 12–78)
ANION GAP SERPL CALC-SCNC: 5 MMOL/L (ref 5–15)
AST SERPL-CCNC: 12 U/L (ref 15–37)
BASOPHILS # BLD: 0 K/UL (ref 0–0.1)
BASOPHILS NFR BLD: 1 % (ref 0–1)
BILIRUB SERPL-MCNC: 0.4 MG/DL (ref 0.2–1)
BUN SERPL-MCNC: 13 MG/DL (ref 6–20)
BUN/CREAT SERPL: 16 (ref 12–20)
CALCIUM SERPL-MCNC: 9.4 MG/DL (ref 8.5–10.1)
CHLORIDE SERPL-SCNC: 107 MMOL/L (ref 97–108)
CO2 SERPL-SCNC: 28 MMOL/L (ref 21–32)
CREAT SERPL-MCNC: 0.79 MG/DL (ref 0.55–1.02)
DIFFERENTIAL METHOD BLD: NORMAL
EOSINOPHIL # BLD: 0.1 K/UL (ref 0–0.4)
EOSINOPHIL NFR BLD: 3 % (ref 0–7)
ERYTHROCYTE [DISTWIDTH] IN BLOOD BY AUTOMATED COUNT: 12.8 % (ref 11.5–14.5)
EST. AVERAGE GLUCOSE BLD GHB EST-MCNC: 134 MG/DL
GLOBULIN SER CALC-MCNC: 3.2 G/DL (ref 2–4)
GLUCOSE SERPL-MCNC: 136 MG/DL (ref 65–100)
HBA1C MFR BLD: 6.3 % (ref 4–5.6)
HCT VFR BLD AUTO: 42.1 % (ref 35–47)
HGB BLD-MCNC: 13.5 G/DL (ref 11.5–16)
IMM GRANULOCYTES # BLD AUTO: 0 K/UL (ref 0–0.04)
IMM GRANULOCYTES NFR BLD AUTO: 0 % (ref 0–0.5)
LYMPHOCYTES # BLD: 0.9 K/UL (ref 0.8–3.5)
LYMPHOCYTES NFR BLD: 19 % (ref 12–49)
MCH RBC QN AUTO: 31.2 PG (ref 26–34)
MCHC RBC AUTO-ENTMCNC: 32.1 G/DL (ref 30–36.5)
MCV RBC AUTO: 97.2 FL (ref 80–99)
MONOCYTES # BLD: 0.6 K/UL (ref 0–1)
MONOCYTES NFR BLD: 12 % (ref 5–13)
NEUTS SEG # BLD: 3.1 K/UL (ref 1.8–8)
NEUTS SEG NFR BLD: 65 % (ref 32–75)
NRBC # BLD: 0 K/UL (ref 0–0.01)
NRBC BLD-RTO: 0 PER 100 WBC
PLATELET # BLD AUTO: 279 K/UL (ref 150–400)
PMV BLD AUTO: 10.3 FL (ref 8.9–12.9)
POTASSIUM SERPL-SCNC: 4.1 MMOL/L (ref 3.5–5.1)
PROT SERPL-MCNC: 7.3 G/DL (ref 6.4–8.2)
RBC # BLD AUTO: 4.33 M/UL (ref 3.8–5.2)
SODIUM SERPL-SCNC: 140 MMOL/L (ref 136–145)
WBC # BLD AUTO: 4.7 K/UL (ref 3.6–11)

## 2022-12-09 DIAGNOSIS — F33.9 RECURRENT DEPRESSION (HCC): ICD-10-CM

## 2022-12-09 RX ORDER — DULOXETIN HYDROCHLORIDE 60 MG/1
CAPSULE, DELAYED RELEASE ORAL
Qty: 90 CAPSULE | Refills: 3 | Status: SHIPPED | OUTPATIENT
Start: 2022-12-09

## 2023-02-02 ENCOUNTER — TELEPHONE (OUTPATIENT)
Dept: INTERNAL MEDICINE CLINIC | Age: 88
End: 2023-02-02

## 2023-02-02 NOTE — TELEPHONE ENCOUNTER
----- Message from Blas Burgos sent at 2/2/2023  2:51 PM EST -----  Subject: Appointment Request    Reason for Call: Established Patient Appointment needed: Routine Existing   Condition Follow Up    QUESTIONS    Reason for appointment request? No appointments available during search     Additional Information for Provider? Daughter Chel Dover would like her mom to   be seen as soon as possible for her dementia. She feels she is getting   worse. She is currently being for a UTI. Please call back.   ---------------------------------------------------------------------------  --------------  [facility] L [facility]  [phone]; OK to leave message on voicemail  ---------------------------------------------------------------------------  --------------  SCRIPT ANSWERS  COVID Screen: Samara Solo

## 2023-02-03 DIAGNOSIS — E78.5 HYPERLIPIDEMIA, UNSPECIFIED HYPERLIPIDEMIA TYPE: ICD-10-CM

## 2023-02-03 RX ORDER — SIMVASTATIN 40 MG/1
TABLET, FILM COATED ORAL
Qty: 90 TABLET | Refills: 3 | Status: SHIPPED | OUTPATIENT
Start: 2023-02-03

## 2023-02-03 NOTE — TELEPHONE ENCOUNTER
Message was sent to patient advising first available appointments are 2/15 if wanting to be scheduled to please let us know to be added to the schedule.

## 2023-03-01 PROBLEM — Z17.1 MALIGNANT NEOPLASM OF LEFT BREAST IN FEMALE, ESTROGEN RECEPTOR NEGATIVE, UNSPECIFIED SITE OF BREAST (HCC): Status: ACTIVE | Noted: 2023-03-01

## 2023-03-01 PROBLEM — C50.912 MALIGNANT NEOPLASM OF LEFT BREAST IN FEMALE, ESTROGEN RECEPTOR NEGATIVE, UNSPECIFIED SITE OF BREAST (HCC): Status: ACTIVE | Noted: 2023-03-01

## 2023-03-02 ENCOUNTER — OFFICE VISIT (OUTPATIENT)
Dept: INTERNAL MEDICINE CLINIC | Age: 88
End: 2023-03-02
Payer: MEDICARE

## 2023-03-02 ENCOUNTER — HOSPITAL ENCOUNTER (OUTPATIENT)
Dept: GENERAL RADIOLOGY | Age: 88
Discharge: HOME | End: 2023-03-02
Payer: MEDICARE

## 2023-03-02 VITALS
WEIGHT: 167 LBS | RESPIRATION RATE: 14 BRPM | TEMPERATURE: 98.3 F | OXYGEN SATURATION: 98 % | DIASTOLIC BLOOD PRESSURE: 72 MMHG | BODY MASS INDEX: 29.59 KG/M2 | HEIGHT: 63 IN | HEART RATE: 84 BPM | SYSTOLIC BLOOD PRESSURE: 132 MMHG

## 2023-03-02 DIAGNOSIS — E78.5 HYPERLIPIDEMIA, UNSPECIFIED HYPERLIPIDEMIA TYPE: ICD-10-CM

## 2023-03-02 DIAGNOSIS — Z17.1 MALIGNANT NEOPLASM OF LEFT BREAST IN FEMALE, ESTROGEN RECEPTOR NEGATIVE, UNSPECIFIED SITE OF BREAST (HCC): ICD-10-CM

## 2023-03-02 DIAGNOSIS — G30.1 LATE ONSET ALZHEIMER'S DEMENTIA WITHOUT BEHAVIORAL DISTURBANCE (HCC): ICD-10-CM

## 2023-03-02 DIAGNOSIS — Z00.00 MEDICARE ANNUAL WELLNESS VISIT, SUBSEQUENT: Primary | ICD-10-CM

## 2023-03-02 DIAGNOSIS — J06.9 UPPER RESPIRATORY TRACT INFECTION, UNSPECIFIED TYPE: ICD-10-CM

## 2023-03-02 DIAGNOSIS — F33.9 RECURRENT DEPRESSION (HCC): ICD-10-CM

## 2023-03-02 DIAGNOSIS — G47.33 OSA (OBSTRUCTIVE SLEEP APNEA): ICD-10-CM

## 2023-03-02 DIAGNOSIS — F02.80 LATE ONSET ALZHEIMER'S DEMENTIA WITHOUT BEHAVIORAL DISTURBANCE (HCC): ICD-10-CM

## 2023-03-02 DIAGNOSIS — C50.912 MALIGNANT NEOPLASM OF LEFT BREAST IN FEMALE, ESTROGEN RECEPTOR NEGATIVE, UNSPECIFIED SITE OF BREAST (HCC): ICD-10-CM

## 2023-03-02 DIAGNOSIS — I10 ESSENTIAL HYPERTENSION: ICD-10-CM

## 2023-03-02 DIAGNOSIS — Z71.89 ADVANCED DIRECTIVES, COUNSELING/DISCUSSION: ICD-10-CM

## 2023-03-02 DIAGNOSIS — R73.01 IFG (IMPAIRED FASTING GLUCOSE): ICD-10-CM

## 2023-03-02 PROCEDURE — 71046 X-RAY EXAM CHEST 2 VIEWS: CPT

## 2023-03-02 RX ORDER — GUAIFENESIN 600 MG/1
600 TABLET, EXTENDED RELEASE ORAL 2 TIMES DAILY
Qty: 10 TABLET | Refills: 0 | Status: SHIPPED | OUTPATIENT
Start: 2023-03-02 | End: 2023-03-07

## 2023-03-02 RX ORDER — MEMANTINE HYDROCHLORIDE 28 MG/1
28 CAPSULE, EXTENDED RELEASE ORAL DAILY
Qty: 30 CAPSULE | Refills: 0 | Status: SHIPPED | OUTPATIENT
Start: 2023-03-02

## 2023-03-02 RX ORDER — BENZONATATE 200 MG/1
200 CAPSULE ORAL
Qty: 21 CAPSULE | Refills: 0 | Status: SHIPPED | OUTPATIENT
Start: 2023-03-02 | End: 2023-03-09

## 2023-03-02 NOTE — PATIENT INSTRUCTIONS
Medicare Wellness Visit, Female     The best way to live healthy is to have a lifestyle where you eat a well-balanced diet, exercise regularly, limit alcohol use, and quit all forms of tobacco/nicotine, if applicable. Regular preventive services are another way to keep healthy. Preventive services (vaccines, screening tests, monitoring & exams) can help personalize your care plan, which helps you manage your own care. Screening tests can find health problems at the earliest stages, when they are easiest to treat. [facility] follows the current, evidence-based guidelines published by the [facility] Mac[facility] ([facility]) when recommending preventive services for our patients. Because we follow these guidelines, sometimes recommendations change over time as research supports it. (For example, mammograms used to be recommended annually. Even though Medicare will still pay for an annual mammogram, the newer guidelines recommend a mammogram every two years for women of average risk). Of course, you and your doctor may decide to screen more often for some diseases, based on your risk and your co-morbidities (chronic disease you are already diagnosed with). Preventive services for you include:  - Medicare offers their members a free annual wellness visit, which is time for you and your primary care provider to discuss and plan for your preventive service needs.  Take advantage of this benefit every year!    -Over the age of 72 should receive the recommended pneumonia vaccines.    -All adults should have a flu vaccine yearly.  -All adults should have a tetanus vaccine every 10 years.   -Over the age 48 should receive the shingles vaccines.        -All adults should be screened once for Hepatitis C.  -All adults age 38-68 who are overweight should have a diabetes screening test once every three years.   -Other screening tests and preventive services for persons with diabetes include: an eye exam to screen for diabetic retinopathy, a kidney function test, a foot exam, and stricter control over your cholesterol.   -Cardiovascular screening for adults with routine risk involves an electrocardiogram (ECG) at intervals determined by your doctor.     -Colorectal cancer screenings should be done for adults age 39-70 with no increased risk factors for colorectal cancer. There are a number of acceptable methods of screening for this type of cancer. Each test has its own benefits and drawbacks. Discuss with your doctor what is most appropriate for you during your annual wellness visit. The different tests include: colonoscopy (considered the best screening method), a fecal occult blood test, a fecal DNA test, and sigmoidoscopy.    -Lung cancer screening is recommended annually with a low dose CT scan for adults between age 54 and 68, who have smoked at least 30 pack years (equivalent of 1 pack per day for 30 days), and who is a current smoker or quit less than 15 years ago.    -A bone mass density test is recommended when a woman turns 65 to screen for osteoporosis. This test is only recommended one time, as a screening. Some providers will use this same test as a disease monitoring tool if you already have osteoporosis. -Breast cancer screenings are recommended every other year for women of normal risk, age 54-69.    -Cervical cancer screenings for women over age 72 are only recommended with certain risk factors.      Here is a list of your current Health Maintenance items (your personalized list of preventive services) with a due date:  Health Maintenance Due   Topic Date Due    Shingles Vaccine (1 of 2) Never done    COVID-19 Vaccine (5 - Booster for [employer] series) 07/27/2022

## 2023-03-02 NOTE — PROGRESS NOTES
Samantha Lyman  Identified pt with two pt identifiers(name and ). Chief Complaint   Patient presents with    Annual Wellness Visit     RM21// pt presenting today for annual [address]; daughter has some concerns with the memory medications, sleep apnea; male friend was recently d/c from hospital with pneumonia pt developed nasal congestion on Monday, chest congestion and cough developed on Wednesday. 1. Have you been to the ER, urgent care clinic since your last visit? Hospitalized since your last visit? NO    2. Have you seen or consulted any other health care providers outside of the [address] [place] [address] since your last visit? Include any pap smears or colon screening. NO      Provider notified of reason for visit, vitals and flowsheets obtained on patients.      Patient received paperwork for advance directive during previous visit but has not completed at this time     Reviewed record In preparation for visit, huddled with provider and have obtained necessary documentation      Health Maintenance Due   Topic    Shingles Vaccine (1 of 2)    Medicare Yearly Exam     COVID-19 Vaccine (5 - Booster for Pfizer series)       Wt Readings from Last 3 Encounters:   23 167 lb (75.8 kg)   22 168 lb (76.2 kg)   04/15/22 168 lb (76.2 kg)     Temp Readings from Last 3 Encounters:   23 98.3 °F (36.8 °C) (Oral)   22 97.6 °F (36.4 °C) (Oral)   04/15/22 98 °F (36.7 °C) (Oral)     BP Readings from Last 3 Encounters:   23 (!) 149/76   22 122/72   04/15/22 134/70     Pulse Readings from Last 3 Encounters:   23 84   22 80   04/15/22 71     Vitals:    23 1338   BP: (!) 149/76   Pulse: 84   Resp: 14   Temp: 98.3 °F (36.8 °C)   TempSrc: Oral   SpO2: 98%   Weight: 167 lb (75.8 kg)   Height: 5' 3\" (1.6 m)   PainSc:   0 - No pain         Learning Assessment:  :     Learning Assessment 2021   PRIMARY LEARNER Patient Patient   [address] PRIMARY LEARNER - HEARING [address] NAME - daughter   PRIMARY LANGUAGE ENGLISH ENGLISH   LEARNER PREFERENCE PRIMARY DEMONSTRATION DEMONSTRATION     - LISTENING   ANSWERED BY patient patient   RELATIONSHIP SELF SELF       Depression Screening:  :     3 most recent PHQ Screens 3/2/2023   PHQ Not Done -   Little interest or pleasure in doing things Several days   Feeling down, depressed, irritable, or hopeless Several days   Total Score PHQ 2 2   Trouble falling or staying asleep, or sleeping too much Not at all   Feeling tired or having little energy Not at all   Poor appetite, weight loss, or overeating Not at all   Feeling bad about yourself - or that you are a failure or have let yourself or your family down Not at all   Trouble concentrating on things such as school, work, reading, or watching TV Not at all   Moving or speaking so slowly that other people could have noticed; or the opposite being so fidgety that others notice Not at all   Thoughts of being better off dead, or hurting yourself in some way Not at all   PHQ 9 Score 2   How difficult have these problems made it for you to do your work, take care of your home and get along with others Not difficult at all       Fall Risk Assessment:  :     Fall Risk Assessment, last 12 mths 3/2/2023   Able to walk? Yes   Fall in past 12 months? 1   Do you feel unsteady? 0   Are you worried about falling 0   Is TUG test greater than 12 seconds? 0   Is the gait abnormal? 0   Number of falls in past 12 months 1   Fall with injury? 0       Abuse Screening:  :     Abuse Screening Questionnaire 2/27/2023 9/18/2019 6/4/2019 8/30/2018 12/26/2017   Do you ever feel afraid of your partner? N N N N N   Are you in a relationship with someone who physically or mentally threatens you? N N N N N   Is it safe for you to go home? Y Y Y Y Y       ADL Screening:  :     No flowsheet data found. Medication reconciliation up to date and corrected with patient at this time.

## 2023-03-02 NOTE — ACP (ADVANCE CARE PLANNING)
Advance Care Planning     General Advance Care Planning (ACP) Conversation      Date of Conversation: 3/2/2023  Conducted with: Patient with Decision Making Capacity    Healthcare Decision Maker:   No healthcare decision makers have been documented. Click here to complete [address] including selection of the Healthcare Decision Maker Relationship (ie \"Primary\")    Today we documented Decision Maker(s) consistent with Legal Next of Kin hierarchy. Content/Action Overview:    Has ACP document(s) NOT on file - requested patient to provide      Length of Voluntary ACP Conversation in minutes:  <16 minutes (Non-Billable)    Jose Raul Pittman MD

## 2023-03-02 NOTE — PROGRESS NOTES
Rabia Baum is a 82 y.o. female who presents today for No chief complaint on file. .      She has a history of   Patient Active Problem List   Diagnosis Code    Recurrent depression ([facility] [address] F33.9    Essential hypertension I10    Hyperlipidemia E78.5    MCI (mild cognitive impairment) G31.84    Arthritis M19.90    Age-related nuclear cataract of both eyes H25.13    IFG (impaired fasting glucose) R73.01    Ductal carcinoma in situ (DCIS) of left breast D05.12    Late onset Alzheimer's dementia without behavioral disturbance (HCC) G30.1, F02.80   . Today patient is here for CPE. she does not have other concerns. Human Metapneumo Infection exposure recently. She has had a cough for a couple days. Some fatigue, but no shortness of breath. Does have some mucus production. Hypertension  Hypertension ROS: taking medications as instructed, no medication side effects noted, no TIA's, no chest pain on exertion, no dyspnea on exertion, no swelling of ankles     reports that she has quit smoking. Her smoking use included cigarettes. She has a 0.75 pack-year smoking history. She has never used smokeless tobacco.    reports current alcohol use of about 2.0 standard drinks per week. BP Readings from Last 2 Encounters:   11/02/22 122/72   04/15/22 134/70     Dementia: acutely worse ealry Feb. Treated for UTI. Since memory better. At this point she cannot be left alone safely. Behavior is relatively calm. Often does have to get redirected. If her friend's health does not improve, and he cannot stay with her as much as he has been, they may need to move her to memory care. Asking about Namenda extended release. Could consider adding Aricept, but discussed with daughter that it is unlikely to benefit her at this point. Daughter has recently stayed with mother and reports that her snoring and obstructive sleep apnea has gotten worse. She was previously on the machine.   Wondering if an oral device may help her mom get better rest.    Hyperlipidemia  On statin  ROS: taking medications as instructed, no medication side effects noted  No new myalgias, no joint pains, no weakness  No TIA's, no chest pain on exertion, no dyspnea on exertion, no swelling of ankles. Lab Results   Component Value Date/Time    Cholesterol, total 195 04/15/2022 12:30 PM    HDL Cholesterol 52 04/15/2022 12:30 PM    LDL, calculated 116.4 (H) 04/15/2022 12:30 PM    VLDL, calculated 26.6 04/15/2022 12:30 PM    Triglyceride 133 04/15/2022 12:30 PM    CHOL/HDL Ratio 3.8 04/15/2022 12:30 PM     Knee has been ok. Taking NSAIDs 1 per day. Health maintenance hx includes:  Exercise: moderately active. Form of exercise: walking. Diet: generally follows a low fat low cholesterol diet  Social: retired. Lives at home alone at [address] [place] [address]. Has 3 children.  for several years. From [place] originally, but moved here form [place]. She no longer drives. Her daughter helps her with her finances. Has partner at home, but considering memory care. Did have 1 fall around [place]. All alcohol has been stopped    Screening:    Colon cancer screening:  Last Colonoscopy: 2021 due to + cologuard and this was WNL. Breast cancer screening:DCIS with lumpectomy in 2020. Since would like repeat MMG.     Cervical cancer screening: N/A   Osteoporosis screening: Normal in 2008      Immunizations:     Immunization History   Administered Date(s) Administered     Influenza, Danielle Wilson (age 72 y+), High Dose 10/26/2020    COVID-19, PFIZER PURPLE top, DILUTE for use, (age 15 y+), IM, 30mcg/0.3mL 01/15/2021, 02/15/2021, 11/05/2021, 06/01/2022    Influenza High Dose Vaccine PF 10/16/2018, 12/12/2019, 10/05/2022    Pneumococcal Conjugate (PCV-13) 08/14/2015    Pneumococcal Polysaccharide (PPSV-23) 11/17/2000, 02/25/2013    Tdap 10/27/1997, 06/01/2007, 09/18/2019    Zoster Vaccine, Live 06/01/2007      Immunization status: up to date and documented. ROS  Review of Systems   Constitutional:  Positive for malaise/fatigue. Negative for chills, fever and weight loss. HENT:  Positive for congestion. Negative for hearing loss and sore throat. Eyes:  Negative for blurred vision, double vision and photophobia. Respiratory:  Positive for cough. Negative for shortness of breath. Cardiovascular:  Negative for chest pain, palpitations and leg swelling. Gastrointestinal:  Negative for abdominal pain, constipation, diarrhea, heartburn, nausea and vomiting. Genitourinary:  Negative for dysuria, frequency and urgency. Musculoskeletal:  Positive for falls. Negative for joint pain and myalgias. Skin:  Negative for rash. Neurological: Negative. Negative for headaches. Endo/Heme/Allergies:  Does not bruise/bleed easily. Psychiatric/Behavioral:  Positive for memory loss. Negative for hallucinations and suicidal ideas. The patient is not nervous/anxious and does not have insomnia. There were no vitals taken for this visit. Physical Exam  Constitutional:       General: She is not in acute distress. Appearance: She is well-developed. HENT:      Head: Normocephalic and atraumatic. Left Ear: Tympanic membrane normal.   Neck:      Thyroid: No thyromegaly. Cardiovascular:      Rate and Rhythm: Normal rate and regular rhythm. Heart sounds: No murmur heard. Pulmonary:      Effort: Pulmonary effort is normal.      Breath sounds: Normal breath sounds. No wheezing. Abdominal:      General: Bowel sounds are normal. There is no distension. Palpations: Abdomen is soft. Musculoskeletal:      Cervical back: Normal range of motion and neck supple. Skin:     General: Skin is warm and dry. Neurological:      General: No focal deficit present. Mental Status: She is alert. Cranial Nerves: No cranial nerve deficit. Comments: Answers appropriately. Oriented to person and daughter, but not situation or time   Psychiatric:         Behavior: Behavior normal.         Current Outpatient Medications   Medication Sig    simvastatin (ZOCOR) 40 mg tablet TAKE 1 TABLET EVERY NIGHT    DULoxetine (CYMBALTA) 60 mg capsule TAKE 1 CAPSULE DAILY    lisinopriL (PRINIVIL, ZESTRIL) 20 mg tablet TAKE 1 TABLET DAILY    diclofenac EC (VOLTAREN) 75 mg EC tablet TAKE 1 TABLET NIGHTLY    famotidine (PEPCID) 20 mg tablet TAKE 1 TABLET DAILY    memantine (NAMENDA) 10 mg tablet TAKE 1 TABLET TWICE A DAY    melatonin 10 mg tab Take 10 mg by mouth nightly. BIOTIN PO Take 1 Cap by mouth daily. CYANOCOBALAMIN, VITAMIN B-12, PO Take 1 Cap by mouth daily. multivitamin (ONE A DAY) tablet Take 1 Tab by mouth daily. No current facility-administered medications for this visit. Past Medical History:   Diagnosis Date    Anxiety     Arthritis     Cancer ([facility] [address] 06/2020    LEFT breast DCIS    Depression     Hypercholesterolemia     Hypertension     Mild cognitive impairment with memory loss       Past Surgical History:   Procedure Laterality Date    HX APPENDECTOMY  1945    HX BREAST BIOPSY Bilateral 1980's, 2020 1980s negative; 2020 positive left    HX BREAST LUMPECTOMY Left 7/14/2020    LEFT BREAST LUMPECTOMY WITH ULTRASOUND performed by Betzaida Saha MD at [address] [place] [address]    total    HX KNEE REPLACEMENT Left 2015    HX TONSILLECTOMY  1944      Social History     Tobacco Use    Smoking status: Former     Packs/day: 0.25     Years: 3.00     Pack years: 0.75     Types: Cigarettes    Smokeless tobacco: Never   Substance Use Topics    Alcohol use: Yes     Alcohol/week: 2.0 standard drinks     Types: 2 Glasses of wine per week      Family History   Adopted: Yes        No Known Allergies     Assessment/Plan  Diagnoses and all orders for this visit:    1.  Medicare annual wellness visit, subsequent-daughter would like her to continue having mammograms. Given her recent surgery for breast cancer, this is reasonable. I do worry about patient's safety if she cannot have an aide with her 24 hours a day. Mood and behavior are good and calm. They will be considering memory care soon if long-term help cannot be obtained. I discussed the concern over her safety, and daughter is well aware of this. Blood pressures are relatively well controlled. Mood is stable. We will try Namenda XR if covered by insurance, but doubt that she will benefit from Aricept. 2. Hyperlipidemia, unspecified hyperlipidemia type  -     LIPID PANEL; Future  -     CBC WITH AUTOMATED DIFF; Future  -     METABOLIC PANEL, COMPREHENSIVE; Future    3. Recurrent depression ([address]    4. Essential hypertension    5. IFG (impaired fasting glucose)  -     HEMOGLOBIN A1C WITH EAG; Future    6. Late onset Alzheimer's dementia without behavioral disturbance ([address] [address]    7. Malignant neoplasm of left breast in female, estrogen receptor negative, unspecified site of breast ([address]    8. Advanced directives, counseling/discussion    9. Upper respiratory tract infection, unspecified type-recently partner was diagnosed with metapneumovirus. Patient having viral upper respiratory symptoms. Not short of breath and lung exam relatively normal.  Will check chest x-ray. Patient to take as needed Mucinex and let us know if things are worsening.  -     XR CHEST PA LAT; Future  -     guaiFENesin ER (MUCINEX) 600 mg ER tablet; Take 1 Tablet by mouth two (2) times a day for 5 days. -     benzonatate (TESSALON) 200 mg capsule; Take 1 Capsule by mouth three (3) times daily as needed for Cough for up to 7 days. 10. RANDY (obstructive sleep apnea)- in the past. Consider home sleep study  -     REFERRAL TO SLEEP STUDIES    Other orders  -     memantine ER (Namenda XR) 28 mg capsule; Take 1 Capsule by mouth daily.           Romayne Dross, MD  3/1/2023    This note was created with the help of speech recognition software (Dragon) and may contain some 'sound alike' errors. This is the Subsequent Medicare Annual Wellness Exam, performed 12 months or more after the Initial AWV or the last Subsequent AWV    I have reviewed the patient's medical history in detail and updated the computerized patient record. Assessment/Plan   Education and counseling provided:  Are appropriate based on today's review and evaluation  End-of-Life planning (with patient's consent)  Colorectal cancer screening tests  Bone mass measurement (DEXA)    1. Hyperlipidemia, unspecified hyperlipidemia type  2. Recurrent depression ([facility] [address]  3. Essential hypertension  4. IFG (impaired fasting glucose)  5. Late onset Alzheimer's dementia without behavioral disturbance ([facility] [address]  6. Malignant neoplasm of left breast in female, estrogen receptor negative, unspecified site of breast ([facility]  7. Advanced directives, counseling/discussion  8.  Medicare annual wellness visit, subsequent       Depression Risk Factor Screening     3 most recent PHQ Screens 3/2/2023   PHQ Not Done -   Little interest or pleasure in doing things Several days   Feeling down, depressed, irritable, or hopeless Several days   Total Score PHQ 2 2   Trouble falling or staying asleep, or sleeping too much Not at all   Feeling tired or having little energy Not at all   Poor appetite, weight loss, or overeating Not at all   Feeling bad about yourself - or that you are a failure or have let yourself or your family down Not at all   Trouble concentrating on things such as school, work, reading, or watching TV Not at all   Moving or speaking so slowly that other people could have noticed; or the opposite being so fidgety that others notice Not at all   Thoughts of being better off dead, or hurting yourself in some way Not at all   PHQ 9 Score 2   How difficult have these problems made it for you to do your work, take care of your home and get along with others Not difficult at all       Alcohol & Drug Abuse Risk Screen   Do you average more than 1 drink per night or more than 7 drinks a week?: (P) No  On any one occasion in the past three months have you had more than 3 drinks containing alcohol?: (P) Yes            Functional Ability and Level of Safety   Hearing:  Hearing: (P) Patient wears hearing aid      Activities of Daily Living: The home contains: (P) grab bars, rugs  Functional ADLs: (P) Patient does total self care     Ambulation:  Patient ambulates: (P) with difficulty  How far the patient can walk with difficulty: (P) A block or two with walker  Walking is difficult due to: (P) pain, shortness of breath, additional comments below  Additional comments about walking difficulties: (P) Stamina  Walking aids: (P) cane, walker     Fall Risk:  Fall Risk Assessment, last 12 mths 3/2/2023   Able to walk? Yes   Fall in past 12 months? 1   Do you feel unsteady? 0   Are you worried about falling 0   Is TUG test greater than 12 seconds? 0   Is the gait abnormal? 0   Number of falls in past 12 months 1   Fall with injury?  0     Abuse Screen:  Do you ever feel afraid of your partner?: (P) No  Are you in a relationship with someone who physically or mentally threatens you?: (P) No  Is it safe for you to go home?: (P) Yes        Cognitive Screening   Has your family/caregiver stated any concerns about your memory?: (P) Yes         Health Maintenance Due     Health Maintenance Due   Topic Date Due    Shingles Vaccine (1 of 2) Never done    COVID-19 Vaccine (5 - Booster for Pfizer series) 07/27/2022       Patient Care Team   Patient Care Team:  Miguel Angel Aggarwal MD as PCP - General (Internal Medicine Physician)  Miguel Angel Aggarwal MD as PCP - [facility]  Faraz Gauthier MD as Physician (Surgery General)  Horacio Orourke MD as Physician (Hematology and Oncology)    History     Patient Active Problem List   Diagnosis Code    Recurrent depression [facility]) F33.9    Essential hypertension I10 Hyperlipidemia E78.5    MCI (mild cognitive impairment) G31.84    Arthritis M19.90    Age-related nuclear cataract of both eyes H25.13    IFG (impaired fasting glucose) R73.01    Ductal carcinoma in situ (DCIS) of left breast D05.12    Late onset Alzheimer's dementia without behavioral disturbance (HCC) G30.1, F02.80    Malignant neoplasm of left breast in female, estrogen receptor negative, unspecified site of breast [facility] C50.912, Z17.1     Past Medical History:   Diagnosis Date    Anxiety     Arthritis     [facility]    LEFT breast DCIS    Depression     Hypercholesterolemia     Hypertension     Mild cognitive impairment with memory loss       Past Surgical History:   Procedure Laterality Date    HX APPENDECTOMY  1945    HX BREAST BIOPSY Bilateral 1980's, 2020 1980s negative; 2020 positive left    HX BREAST LUMPECTOMY Left 7/14/2020    LEFT BREAST LUMPECTOMY WITH ULTRASOUND performed by Richard Downing MD at [address] [place] [address]    total    HX KNEE REPLACEMENT Left 2015    HX TONSILLECTOMY  1944     Current Outpatient Medications   Medication Sig Dispense Refill    simvastatin (ZOCOR) 40 mg tablet TAKE 1 TABLET EVERY NIGHT 90 Tablet 3    DULoxetine (CYMBALTA) 60 mg capsule TAKE 1 CAPSULE DAILY 90 Capsule 3    lisinopriL (PRINIVIL, ZESTRIL) 20 mg tablet TAKE 1 TABLET DAILY 90 Tablet 3    diclofenac EC (VOLTAREN) 75 mg EC tablet TAKE 1 TABLET NIGHTLY 90 Tablet 3    famotidine (PEPCID) 20 mg tablet TAKE 1 TABLET DAILY 90 Tablet 3    memantine (NAMENDA) 10 mg tablet TAKE 1 TABLET TWICE A  Tablet 3    melatonin 10 mg tab Take 10 mg by mouth nightly. BIOTIN PO Take 1 Cap by mouth daily. CYANOCOBALAMIN, VITAMIN B-12, PO Take 1 Cap by mouth daily. multivitamin (ONE A DAY) tablet Take 1 Tab by mouth daily.        No Known Allergies    Family History   Adopted: Yes     Social History     Tobacco Use    Smoking status: Former     Packs/day: 0.25     Years: 3.00 Pack years: 0.75     Types: Cigarettes    Smokeless tobacco: Never   Substance Use Topics    Alcohol use:  Yes     Alcohol/week: 2.0 standard drinks     Types: 2 Glasses of wine per week         Irma Sevilla MD

## 2023-03-03 LAB
ALBUMIN SERPL-MCNC: 4.3 G/DL (ref 3.5–5)
ALBUMIN/GLOB SERPL: 1.3 (ref 1.1–2.2)
ALP SERPL-CCNC: 88 U/L (ref 45–117)
ALT SERPL-CCNC: 37 U/L (ref 12–78)
ANION GAP SERPL CALC-SCNC: 6 MMOL/L (ref 5–15)
AST SERPL-CCNC: 16 U/L (ref 15–37)
BASOPHILS # BLD: 0 K/UL (ref 0–0.1)
BASOPHILS NFR BLD: 1 % (ref 0–1)
BILIRUB SERPL-MCNC: 0.3 MG/DL (ref 0.2–1)
BUN SERPL-MCNC: 19 MG/DL (ref 6–20)
BUN/CREAT SERPL: 20 (ref 12–20)
CALCIUM SERPL-MCNC: 9.8 MG/DL (ref 8.5–10.1)
CHLORIDE SERPL-SCNC: 105 MMOL/L (ref 97–108)
CHOLEST SERPL-MCNC: 140 MG/DL
CO2 SERPL-SCNC: 29 MMOL/L (ref 21–32)
CREAT SERPL-MCNC: 0.95 MG/DL (ref 0.55–1.02)
DIFFERENTIAL METHOD BLD: ABNORMAL
EOSINOPHIL # BLD: 0.3 K/UL (ref 0–0.4)
EOSINOPHIL NFR BLD: 5 % (ref 0–7)
ERYTHROCYTE [DISTWIDTH] IN BLOOD BY AUTOMATED COUNT: 12.9 % (ref 11.5–14.5)
EST. AVERAGE GLUCOSE BLD GHB EST-MCNC: 143 MG/DL
GLOBULIN SER CALC-MCNC: 3.3 G/DL (ref 2–4)
GLUCOSE SERPL-MCNC: 132 MG/DL (ref 65–100)
HBA1C MFR BLD: 6.6 % (ref 4–5.6)
HCT VFR BLD AUTO: 42.1 % (ref 35–47)
HDLC SERPL-MCNC: 39 MG/DL
HDLC SERPL: 3.6 (ref 0–5)
HGB BLD-MCNC: 13.4 G/DL (ref 11.5–16)
IMM GRANULOCYTES # BLD AUTO: 0 K/UL (ref 0–0.04)
IMM GRANULOCYTES NFR BLD AUTO: 0 % (ref 0–0.5)
LDLC SERPL CALC-MCNC: 53.2 MG/DL (ref 0–100)
LYMPHOCYTES # BLD: 0.9 K/UL (ref 0.8–3.5)
LYMPHOCYTES NFR BLD: 18 % (ref 12–49)
MCH RBC QN AUTO: 30.3 PG (ref 26–34)
MCHC RBC AUTO-ENTMCNC: 31.8 G/DL (ref 30–36.5)
MCV RBC AUTO: 95.2 FL (ref 80–99)
MONOCYTES # BLD: 0.7 K/UL (ref 0–1)
MONOCYTES NFR BLD: 15 % (ref 5–13)
NEUTS SEG # BLD: 3 K/UL (ref 1.8–8)
NEUTS SEG NFR BLD: 61 % (ref 32–75)
NRBC # BLD: 0 K/UL (ref 0–0.01)
NRBC BLD-RTO: 0 PER 100 WBC
PLATELET # BLD AUTO: 274 K/UL (ref 150–400)
PMV BLD AUTO: 10.6 FL (ref 8.9–12.9)
POTASSIUM SERPL-SCNC: 4.4 MMOL/L (ref 3.5–5.1)
PROT SERPL-MCNC: 7.6 G/DL (ref 6.4–8.2)
RBC # BLD AUTO: 4.42 M/UL (ref 3.8–5.2)
SODIUM SERPL-SCNC: 140 MMOL/L (ref 136–145)
TRIGL SERPL-MCNC: 239 MG/DL (ref ?–150)
VLDLC SERPL CALC-MCNC: 47.8 MG/DL
WBC # BLD AUTO: 4.9 K/UL (ref 3.6–11)

## 2023-03-29 RX ORDER — HYDROXYZINE PAMOATE 25 MG/1
25 CAPSULE ORAL
Qty: 30 CAPSULE | Refills: 0 | Status: SHIPPED | OUTPATIENT
Start: 2023-03-29

## 2023-04-04 ENCOUNTER — HOSPITAL ENCOUNTER (OUTPATIENT)
Dept: MAMMOGRAPHY | Age: 88
End: 2023-04-04
Attending: INTERNAL MEDICINE
Payer: MEDICARE

## 2023-04-04 PROCEDURE — 77067 SCR MAMMO BI INCL CAD: CPT

## 2023-06-28 ENCOUNTER — TELEPHONE (OUTPATIENT)
Age: 88
End: 2023-06-28

## 2023-06-28 RX ORDER — MEMANTINE HYDROCHLORIDE 10 MG/1
10 TABLET ORAL 2 TIMES DAILY
Qty: 180 TABLET | Refills: 2 | Status: SHIPPED | OUTPATIENT
Start: 2023-06-28

## 2023-09-05 ENCOUNTER — OFFICE VISIT (OUTPATIENT)
Age: 88
End: 2023-09-05
Payer: MEDICARE

## 2023-09-05 VITALS
RESPIRATION RATE: 16 BRPM | SYSTOLIC BLOOD PRESSURE: 138 MMHG | BODY MASS INDEX: 29.34 KG/M2 | DIASTOLIC BLOOD PRESSURE: 70 MMHG | HEART RATE: 74 BPM | WEIGHT: 165.6 LBS | OXYGEN SATURATION: 96 % | HEIGHT: 63 IN | TEMPERATURE: 97.6 F

## 2023-09-05 DIAGNOSIS — R73.01 IFG (IMPAIRED FASTING GLUCOSE): ICD-10-CM

## 2023-09-05 DIAGNOSIS — Z91.81 AT HIGH RISK FOR FALLS: ICD-10-CM

## 2023-09-05 DIAGNOSIS — F02.80 LATE ONSET ALZHEIMER'S DEMENTIA WITHOUT BEHAVIORAL DISTURBANCE (HCC): ICD-10-CM

## 2023-09-05 DIAGNOSIS — M19.90 ARTHRITIS: ICD-10-CM

## 2023-09-05 DIAGNOSIS — G30.1 LATE ONSET ALZHEIMER'S DEMENTIA WITHOUT BEHAVIORAL DISTURBANCE (HCC): ICD-10-CM

## 2023-09-05 DIAGNOSIS — I10 ESSENTIAL HYPERTENSION: Primary | ICD-10-CM

## 2023-09-05 DIAGNOSIS — F33.9 RECURRENT DEPRESSION (HCC): ICD-10-CM

## 2023-09-05 LAB
ALBUMIN SERPL-MCNC: 4.2 G/DL (ref 3.5–5)
ALBUMIN/GLOB SERPL: 1.3 (ref 1.1–2.2)
ALP SERPL-CCNC: 80 U/L (ref 45–117)
ALT SERPL-CCNC: 36 U/L (ref 12–78)
ANION GAP SERPL CALC-SCNC: 8 MMOL/L (ref 5–15)
AST SERPL-CCNC: 18 U/L (ref 15–37)
BILIRUB SERPL-MCNC: 0.4 MG/DL (ref 0.2–1)
BUN SERPL-MCNC: 20 MG/DL (ref 6–20)
BUN/CREAT SERPL: 19 (ref 12–20)
CALCIUM SERPL-MCNC: 9.7 MG/DL (ref 8.5–10.1)
CHLORIDE SERPL-SCNC: 110 MMOL/L (ref 97–108)
CO2 SERPL-SCNC: 23 MMOL/L (ref 21–32)
CREAT SERPL-MCNC: 1.05 MG/DL (ref 0.55–1.02)
GLOBULIN SER CALC-MCNC: 3.3 G/DL (ref 2–4)
GLUCOSE SERPL-MCNC: 129 MG/DL (ref 65–100)
POTASSIUM SERPL-SCNC: 4.6 MMOL/L (ref 3.5–5.1)
PROT SERPL-MCNC: 7.5 G/DL (ref 6.4–8.2)
SODIUM SERPL-SCNC: 141 MMOL/L (ref 136–145)

## 2023-09-05 PROCEDURE — 3078F DIAST BP <80 MM HG: CPT | Performed by: INTERNAL MEDICINE

## 2023-09-05 PROCEDURE — 1123F ACP DISCUSS/DSCN MKR DOCD: CPT | Performed by: INTERNAL MEDICINE

## 2023-09-05 PROCEDURE — 99214 OFFICE O/P EST MOD 30 MIN: CPT | Performed by: INTERNAL MEDICINE

## 2023-09-05 PROCEDURE — G8419 CALC BMI OUT NRM PARAM NOF/U: HCPCS | Performed by: INTERNAL MEDICINE

## 2023-09-05 PROCEDURE — 3075F SYST BP GE 130 - 139MM HG: CPT | Performed by: INTERNAL MEDICINE

## 2023-09-05 PROCEDURE — 1036F TOBACCO NON-USER: CPT | Performed by: INTERNAL MEDICINE

## 2023-09-05 PROCEDURE — G8399 PT W/DXA RESULTS DOCUMENT: HCPCS | Performed by: INTERNAL MEDICINE

## 2023-09-05 PROCEDURE — 1090F PRES/ABSN URINE INCON ASSESS: CPT | Performed by: INTERNAL MEDICINE

## 2023-09-05 PROCEDURE — G8427 DOCREV CUR MEDS BY ELIG CLIN: HCPCS | Performed by: INTERNAL MEDICINE

## 2023-09-05 RX ORDER — HYDROXYZINE PAMOATE 25 MG/1
25 CAPSULE ORAL 3 TIMES DAILY PRN
COMMUNITY

## 2023-09-05 SDOH — ECONOMIC STABILITY: INCOME INSECURITY: HOW HARD IS IT FOR YOU TO PAY FOR THE VERY BASICS LIKE FOOD, HOUSING, MEDICAL CARE, AND HEATING?: NOT HARD AT ALL

## 2023-09-05 SDOH — ECONOMIC STABILITY: HOUSING INSECURITY
IN THE LAST 12 MONTHS, WAS THERE A TIME WHEN YOU DID NOT HAVE A STEADY PLACE TO SLEEP OR SLEPT IN A SHELTER (INCLUDING NOW)?: NO

## 2023-09-05 SDOH — ECONOMIC STABILITY: FOOD INSECURITY: WITHIN THE PAST 12 MONTHS, THE FOOD YOU BOUGHT JUST DIDN'T LAST AND YOU DIDN'T HAVE MONEY TO GET MORE.: NEVER TRUE

## 2023-09-05 SDOH — ECONOMIC STABILITY: FOOD INSECURITY: WITHIN THE PAST 12 MONTHS, YOU WORRIED THAT YOUR FOOD WOULD RUN OUT BEFORE YOU GOT MONEY TO BUY MORE.: NEVER TRUE

## 2023-09-05 ASSESSMENT — ENCOUNTER SYMPTOMS
DIARRHEA: 0
BACK PAIN: 0
ABDOMINAL PAIN: 0
CHEST TIGHTNESS: 0
CONSTIPATION: 0
SHORTNESS OF BREATH: 0

## 2023-09-06 LAB
EST. AVERAGE GLUCOSE BLD GHB EST-MCNC: 151 MG/DL
HBA1C MFR BLD: 6.9 % (ref 4–5.6)

## 2023-09-11 RX ORDER — FAMOTIDINE 20 MG/1
20 TABLET, FILM COATED ORAL DAILY
Qty: 90 TABLET | Refills: 1 | Status: SHIPPED | OUTPATIENT
Start: 2023-09-11

## 2023-12-01 RX ORDER — DULOXETIN HYDROCHLORIDE 60 MG/1
60 CAPSULE, DELAYED RELEASE ORAL DAILY
Qty: 90 CAPSULE | Refills: 1 | Status: SHIPPED | OUTPATIENT
Start: 2023-12-01

## 2023-12-01 RX ORDER — SIMVASTATIN 40 MG
40 TABLET ORAL NIGHTLY
Qty: 90 TABLET | Refills: 1 | Status: SHIPPED | OUTPATIENT
Start: 2023-12-01

## 2023-12-01 RX ORDER — DICLOFENAC SODIUM 75 MG/1
75 TABLET, DELAYED RELEASE ORAL NIGHTLY
Qty: 90 TABLET | Refills: 1 | Status: SHIPPED | OUTPATIENT
Start: 2023-12-01

## 2024-02-27 ENCOUNTER — TELEPHONE (OUTPATIENT)
Age: 89
End: 2024-02-27

## 2024-02-27 NOTE — TELEPHONE ENCOUNTER
02/27/2024--[facility] sent a fax also about this. I have sent a fax back to them with 's recommendation's to fax number [phone].

## 2024-02-27 NOTE — TELEPHONE ENCOUNTER
April Rees called stating the patient is feeling discomfort in her large toe, the podiatrist had came by and trimmed her toes so that may have caused the discomfort, patient is also stating her legs hurt but she is walking fine, patient is also refusing tylenol for the pain    Austin Rees   [phone]

## 2024-03-04 RX ORDER — LISINOPRIL 20 MG/1
20 TABLET ORAL DAILY
Qty: 90 TABLET | Refills: 2 | Status: SHIPPED | OUTPATIENT
Start: 2024-03-04

## 2024-03-11 ENCOUNTER — OFFICE VISIT (OUTPATIENT)
Age: 89
End: 2024-03-11
Payer: MEDICARE

## 2024-03-11 VITALS
HEART RATE: 90 BPM | SYSTOLIC BLOOD PRESSURE: 134 MMHG | OXYGEN SATURATION: 97 % | HEIGHT: 63 IN | TEMPERATURE: 97.9 F | BODY MASS INDEX: 28.88 KG/M2 | RESPIRATION RATE: 16 BRPM | DIASTOLIC BLOOD PRESSURE: 66 MMHG | WEIGHT: 163 LBS

## 2024-03-11 DIAGNOSIS — I10 ESSENTIAL HYPERTENSION: ICD-10-CM

## 2024-03-11 DIAGNOSIS — L03.116 CELLULITIS OF LEFT LOWER EXTREMITY: Primary | ICD-10-CM

## 2024-03-11 DIAGNOSIS — F33.9 RECURRENT DEPRESSION (HCC): ICD-10-CM

## 2024-03-11 DIAGNOSIS — R73.01 IFG (IMPAIRED FASTING GLUCOSE): ICD-10-CM

## 2024-03-11 PROCEDURE — 1036F TOBACCO NON-USER: CPT | Performed by: NURSE PRACTITIONER

## 2024-03-11 PROCEDURE — G8427 DOCREV CUR MEDS BY ELIG CLIN: HCPCS | Performed by: NURSE PRACTITIONER

## 2024-03-11 PROCEDURE — G8419 CALC BMI OUT NRM PARAM NOF/U: HCPCS | Performed by: NURSE PRACTITIONER

## 2024-03-11 PROCEDURE — 99214 OFFICE O/P EST MOD 30 MIN: CPT | Performed by: NURSE PRACTITIONER

## 2024-03-11 PROCEDURE — 1123F ACP DISCUSS/DSCN MKR DOCD: CPT | Performed by: NURSE PRACTITIONER

## 2024-03-11 PROCEDURE — 1090F PRES/ABSN URINE INCON ASSESS: CPT | Performed by: NURSE PRACTITIONER

## 2024-03-11 PROCEDURE — G8484 FLU IMMUNIZE NO ADMIN: HCPCS | Performed by: NURSE PRACTITIONER

## 2024-03-11 RX ORDER — DOXYCYCLINE HYCLATE 100 MG/1
100 CAPSULE ORAL EVERY 12 HOURS
COMMUNITY
Start: 2024-02-28

## 2024-03-11 ASSESSMENT — PATIENT HEALTH QUESTIONNAIRE - PHQ9
3. TROUBLE FALLING OR STAYING ASLEEP: 0
SUM OF ALL RESPONSES TO PHQ QUESTIONS 1-9: 3
SUM OF ALL RESPONSES TO PHQ QUESTIONS 1-9: 3
5. POOR APPETITE OR OVEREATING: 0
SUM OF ALL RESPONSES TO PHQ9 QUESTIONS 1 & 2: 0
9. THOUGHTS THAT YOU WOULD BE BETTER OFF DEAD, OR OF HURTING YOURSELF: 0
SUM OF ALL RESPONSES TO PHQ QUESTIONS 1-9: 3
7. TROUBLE CONCENTRATING ON THINGS, SUCH AS READING THE NEWSPAPER OR WATCHING TELEVISION: 2
6. FEELING BAD ABOUT YOURSELF - OR THAT YOU ARE A FAILURE OR HAVE LET YOURSELF OR YOUR FAMILY DOWN: 0
4. FEELING TIRED OR HAVING LITTLE ENERGY: 1
2. FEELING DOWN, DEPRESSED OR HOPELESS: 0
1. LITTLE INTEREST OR PLEASURE IN DOING THINGS: 0
8. MOVING OR SPEAKING SO SLOWLY THAT OTHER PEOPLE COULD HAVE NOTICED. OR THE OPPOSITE, BEING SO FIGETY OR RESTLESS THAT YOU HAVE BEEN MOVING AROUND A LOT MORE THAN USUAL: 0
10. IF YOU CHECKED OFF ANY PROBLEMS, HOW DIFFICULT HAVE THESE PROBLEMS MADE IT FOR YOU TO DO YOUR WORK, TAKE CARE OF THINGS AT HOME, OR GET ALONG WITH OTHER PEOPLE: 0
SUM OF ALL RESPONSES TO PHQ QUESTIONS 1-9: 3

## 2024-03-11 ASSESSMENT — ENCOUNTER SYMPTOMS
CONSTIPATION: 0
ABDOMINAL PAIN: 0
CHEST TIGHTNESS: 0
EYE PAIN: 0
EYES NEGATIVE: 1
DIARRHEA: 0
RESPIRATORY NEGATIVE: 1
NAUSEA: 0
GASTROINTESTINAL NEGATIVE: 1
EYE REDNESS: 0
SINUS PRESSURE: 0
COLOR CHANGE: 1
COUGH: 0
VOMITING: 0
RHINORRHEA: 0
SINUS PAIN: 0
BLOOD IN STOOL: 0
SHORTNESS OF BREATH: 0
BACK PAIN: 0

## 2024-03-11 NOTE — PROGRESS NOTES
Assessment and Plan     1. Cellulitis of left lower extremity: Improving, continue with Doxycycline. Infection SXS discussed.   2. IFG (impaired fasting glucose): Reviewed glucose level from urgent care. A1C 7.9%, noted a 1% increase. Will start Metformin, mode of use and possible side effects discussed. Low sugar and carb diet recommended. Will reassess in 3 months. Pt agreed with plan.   -     metFORMIN (GLUCOPHAGE) 500 MG tablet; Take 1 tablet by mouth 2 times daily (with meals), Disp-180 tablet, R-1Normal  3. Essential hypertension: At goal. Continue with Lisinopril.  4. Recurrent depression (HCC): Emotional support provided. Pt admits she feels sad due to her Alzheimer's diagnosis. Crisis plan discussed. Continue with Cymbalta. Pt agreed with plan.        Benefits, risks, possible drug interactions, and side effects of all new medications were reviewed with the patient. Pt verbalized understanding.      An electronic signature was used to authenticate this note.  Payton Blackburn, MIRIAM - CNP  3/11/2024      Follow-up and Dispositions    Return in about 3 months (around 6/11/2024).          History of Present Illness   Chief Complaint     María Atkinson is a 88 y.o. female here for had concerns including Follow-up (Urgent care visit on 02/28/2024).   Mrs. Atkinson presents today accompanied by daughter for urgent care visit follow up. Pt presented to urgent care on 02/28/2024 due to abnormal behavior. Pt daughter thought it was due to UTI, however urinalysis at pt first did not show an infection. Glucose was found to be elevated, A1C was 7.9%. Pt also was experiencing redness and oozing from wound to L lower leg. Pt was diagnosed with cellulitis and started on Doxycycline. Pt was been taking antibiotic with compliance and no side effects. Admits she redness has improved and oozing has resolved. Denies fever, edema or pain to L lower extremity. Daughter reports behavioral issues had resolved. PMH includes

## 2024-03-28 ENCOUNTER — TELEPHONE (OUTPATIENT)
Age: 89
End: 2024-03-28

## 2024-03-28 NOTE — TELEPHONE ENCOUNTER
Marvel called from [facility] and she is requesting for the patient most recent labs/ office notes to be fax over to the number below. They will like to put the information into the patient chart.   [phone]

## 2024-04-01 ENCOUNTER — TELEPHONE (OUTPATIENT)
Age: 89
End: 2024-04-01

## 2024-04-01 NOTE — TELEPHONE ENCOUNTER
04/01/2024--Saima patient's POA called this AM via the [facility] stating that patient is having some swelling on her right ankle that she noticed last night that is new and also some sores on her legs (That is not new) and wanted her to be seen. (She stated that she is in a [facility]). Appointment made for Wednesday with  at 11:45 am. She verbalized understanding and had no further concerns at that time.

## 2024-04-03 ENCOUNTER — TELEPHONE (OUTPATIENT)
Age: 89
End: 2024-04-03

## 2024-04-03 DIAGNOSIS — R60.9 EDEMA, UNSPECIFIED TYPE: ICD-10-CM

## 2024-04-03 DIAGNOSIS — R53.1 WEAKNESS: Primary | ICD-10-CM

## 2024-04-03 RX ORDER — MIRTAZAPINE 7.5 MG/1
7.5 TABLET, FILM COATED ORAL NIGHTLY
Qty: 30 TABLET | Refills: 5 | Status: SHIPPED | OUTPATIENT
Start: 2024-04-03

## 2024-04-03 NOTE — TELEPHONE ENCOUNTER
Spoke to daughter in the office.  Unfortunately patient was not ready to come up here and was having some behavioral issues and therefore missed her appointment.  Had a long discussion regarding her lower extremity swelling and how the nurses need to elevate her legs.  I believe that physical therapy would also be beneficial.  We will try to work on sleep-wake cycle by starting her on low-dose mirtazapine at bedtime.  She will schedule her mother to see me in about 3 to 4 weeks.

## 2024-04-17 ENCOUNTER — TELEPHONE (OUTPATIENT)
Age: 89
End: 2024-04-17

## 2024-04-17 NOTE — TELEPHONE ENCOUNTER
April Lima called regarding the patient and B12 supplements.  Daughter purchased 3000mg B12 and the patient has been taking 2000mg. April lima would like verbal approval to adjust dosage from 2000 to 3000.      Marvel [phone] ext. [phone]

## 2024-06-04 RX ORDER — DICLOFENAC SODIUM 75 MG/1
75 TABLET, DELAYED RELEASE ORAL NIGHTLY
Qty: 90 TABLET | Refills: 1 | Status: SHIPPED | OUTPATIENT
Start: 2024-06-04

## 2024-06-04 RX ORDER — MEMANTINE HYDROCHLORIDE 10 MG/1
10 TABLET ORAL 2 TIMES DAILY
Qty: 180 TABLET | Refills: 2 | Status: SHIPPED | OUTPATIENT
Start: 2024-06-04

## 2024-06-04 RX ORDER — SIMVASTATIN 40 MG
40 TABLET ORAL NIGHTLY
Qty: 90 TABLET | Refills: 1 | Status: SHIPPED | OUTPATIENT
Start: 2024-06-04

## 2024-06-04 RX ORDER — DULOXETIN HYDROCHLORIDE 60 MG/1
60 CAPSULE, DELAYED RELEASE ORAL DAILY
Qty: 90 CAPSULE | Refills: 1 | Status: SHIPPED | OUTPATIENT
Start: 2024-06-04

## 2024-06-14 ENCOUNTER — OFFICE VISIT (OUTPATIENT)
Age: 89
End: 2024-06-14
Payer: MEDICARE

## 2024-06-14 VITALS
BODY MASS INDEX: 30.12 KG/M2 | HEIGHT: 63 IN | SYSTOLIC BLOOD PRESSURE: 132 MMHG | RESPIRATION RATE: 16 BRPM | DIASTOLIC BLOOD PRESSURE: 62 MMHG | TEMPERATURE: 97.6 F | HEART RATE: 80 BPM | WEIGHT: 170 LBS | OXYGEN SATURATION: 95 %

## 2024-06-14 DIAGNOSIS — G30.1 LATE ONSET ALZHEIMER'S DEMENTIA WITHOUT BEHAVIORAL DISTURBANCE (HCC): ICD-10-CM

## 2024-06-14 DIAGNOSIS — E11.69 TYPE 2 DIABETES MELLITUS WITH OTHER SPECIFIED COMPLICATION, WITHOUT LONG-TERM CURRENT USE OF INSULIN (HCC): ICD-10-CM

## 2024-06-14 DIAGNOSIS — Z00.00 MEDICARE ANNUAL WELLNESS VISIT, SUBSEQUENT: Primary | ICD-10-CM

## 2024-06-14 DIAGNOSIS — F02.80 LATE ONSET ALZHEIMER'S DEMENTIA WITHOUT BEHAVIORAL DISTURBANCE (HCC): ICD-10-CM

## 2024-06-14 DIAGNOSIS — I10 ESSENTIAL HYPERTENSION: ICD-10-CM

## 2024-06-14 DIAGNOSIS — E78.2 MIXED HYPERLIPIDEMIA: ICD-10-CM

## 2024-06-14 DIAGNOSIS — C50.912 MALIGNANT NEOPLASM OF LEFT FEMALE BREAST, UNSPECIFIED ESTROGEN RECEPTOR STATUS, UNSPECIFIED SITE OF BREAST (HCC): ICD-10-CM

## 2024-06-14 PROCEDURE — 1123F ACP DISCUSS/DSCN MKR DOCD: CPT | Performed by: INTERNAL MEDICINE

## 2024-06-14 PROCEDURE — G0439 PPPS, SUBSEQ VISIT: HCPCS | Performed by: INTERNAL MEDICINE

## 2024-06-14 RX ORDER — DULOXETIN HYDROCHLORIDE 60 MG/1
60 CAPSULE, DELAYED RELEASE ORAL
Qty: 90 CAPSULE | Refills: 1 | Status: SHIPPED | OUTPATIENT
Start: 2024-06-14 | End: 2024-06-14 | Stop reason: SDUPTHER

## 2024-06-14 RX ORDER — SERTRALINE HYDROCHLORIDE 25 MG/1
25 TABLET, FILM COATED ORAL
Qty: 90 TABLET | Refills: 1 | Status: SHIPPED | OUTPATIENT
Start: 2024-06-14

## 2024-06-14 RX ORDER — DULOXETIN HYDROCHLORIDE 30 MG/1
30 CAPSULE, DELAYED RELEASE ORAL
Qty: 90 CAPSULE | Refills: 1 | Status: SHIPPED | OUTPATIENT
Start: 2024-06-14

## 2024-06-14 ASSESSMENT — LIFESTYLE VARIABLES
HOW MANY STANDARD DRINKS CONTAINING ALCOHOL DO YOU HAVE ON A TYPICAL DAY: PATIENT DOES NOT DRINK
HOW OFTEN DO YOU HAVE A DRINK CONTAINING ALCOHOL: NEVER

## 2024-06-14 ASSESSMENT — PATIENT HEALTH QUESTIONNAIRE - PHQ9
SUM OF ALL RESPONSES TO PHQ QUESTIONS 1-9: 1
1. LITTLE INTEREST OR PLEASURE IN DOING THINGS: NOT AT ALL
SUM OF ALL RESPONSES TO PHQ9 QUESTIONS 1 & 2: 1
2. FEELING DOWN, DEPRESSED OR HOPELESS: SEVERAL DAYS

## 2024-06-14 ASSESSMENT — ENCOUNTER SYMPTOMS
ABDOMINAL PAIN: 0
BACK PAIN: 0
CHEST TIGHTNESS: 0
DIARRHEA: 0
CONSTIPATION: 0
SHORTNESS OF BREATH: 0

## 2024-06-14 NOTE — PROGRESS NOTES
made and/or referrals ordered.    Positive Risk Factor Screenings with Interventions:    Fall Risk:  Do you feel unsteady or are you worried about falling? : no  2 or more falls in past year?: (!) yes  Fall with injury in past year?: (!) yes     Interventions:    Reviewed medications, home hazards, visual acuity, and co-morbidities that can increase risk for falls             Activity, Diet, and Weight:  On average, how many days per week do you engage in moderate to strenuous exercise (like a brisk walk)?: 0 days  On average, how many minutes do you engage in exercise at this level?: 0 min    Do you eat balanced/healthy meals regularly?: Yes    Body mass index is 30.11 kg/m². (!) Abnormal      Inactivity Interventions:  Patient declined any further interventions or treatment  Obesity Interventions:  Patient declines any further evaluation or treatment                               Objective   Vitals:    06/14/24 1023 06/14/24 1051   BP: (!) 147/65 132/62   Site: Left Upper Arm    Position: Sitting    Cuff Size: Medium Adult    Pulse: 80    Resp: 16    Temp: 97.6 °F (36.4 °C)    TempSrc: Oral    SpO2: 95%    Weight: 77.1 kg (170 lb)    Height: 1.6 m (5' 3\")       Body mass index is 30.11 kg/m².               No Known Allergies  Prior to Visit Medications    Medication Sig Taking? Authorizing Provider   sertraline (ZOLOFT) 25 MG tablet Take 1 tablet by mouth nightly Yes Louis Garza MD   DULoxetine (CYMBALTA) 30 MG extended release capsule Take 1 capsule by mouth every morning (before breakfast) Yes Louis Garza MD   diclofenac (VOLTAREN) 75 MG EC tablet TAKE 1 TABLET BY MOUTH EVERY NIGHT Yes Louis Garza MD   simvastatin (ZOCOR) 40 MG tablet TAKE 1 TABLET BY MOUTH EVERY NIGHT Yes Louis Garza MD   memantine (NAMENDA) 10 MG tablet TAKE 1 TABLET BY MOUTH TWICE DAILY Yes Louis Garza MD   mirtazapine (REMERON) 7.5 MG tablet Take 1 tablet by mouth nightly Yes Louis Garza MD   metFORMIN

## 2024-06-15 LAB
ALBUMIN SERPL-MCNC: 3.9 G/DL (ref 3.5–5)
ALBUMIN/GLOB SERPL: 1.2 (ref 1.1–2.2)
ALP SERPL-CCNC: 81 U/L (ref 45–117)
ALT SERPL-CCNC: 36 U/L (ref 12–78)
ANION GAP SERPL CALC-SCNC: 3 MMOL/L (ref 5–15)
AST SERPL-CCNC: 19 U/L (ref 15–37)
BASOPHILS # BLD: 0.1 K/UL (ref 0–0.1)
BASOPHILS NFR BLD: 1 % (ref 0–1)
BILIRUB SERPL-MCNC: 0.4 MG/DL (ref 0.2–1)
BUN SERPL-MCNC: 19 MG/DL (ref 6–20)
BUN/CREAT SERPL: 23 (ref 12–20)
CALCIUM SERPL-MCNC: 9.5 MG/DL (ref 8.5–10.1)
CHLORIDE SERPL-SCNC: 107 MMOL/L (ref 97–108)
CHOLEST SERPL-MCNC: 133 MG/DL
CO2 SERPL-SCNC: 27 MMOL/L (ref 21–32)
CREAT SERPL-MCNC: 0.83 MG/DL (ref 0.55–1.02)
DIFFERENTIAL METHOD BLD: ABNORMAL
EOSINOPHIL # BLD: 0.3 K/UL (ref 0–0.4)
EOSINOPHIL NFR BLD: 5 % (ref 0–7)
ERYTHROCYTE [DISTWIDTH] IN BLOOD BY AUTOMATED COUNT: 13.5 % (ref 11.5–14.5)
EST. AVERAGE GLUCOSE BLD GHB EST-MCNC: 148 MG/DL
GLOBULIN SER CALC-MCNC: 3.3 G/DL (ref 2–4)
GLUCOSE SERPL-MCNC: 131 MG/DL (ref 65–100)
HBA1C MFR BLD: 6.8 % (ref 4–5.6)
HCT VFR BLD AUTO: 37.3 % (ref 35–47)
HDLC SERPL-MCNC: 45 MG/DL
HDLC SERPL: 3 (ref 0–5)
HGB BLD-MCNC: 12.3 G/DL (ref 11.5–16)
IMM GRANULOCYTES # BLD AUTO: 0 K/UL (ref 0–0.04)
IMM GRANULOCYTES NFR BLD AUTO: 0 % (ref 0–0.5)
LDLC SERPL CALC-MCNC: 61.2 MG/DL (ref 0–100)
LYMPHOCYTES # BLD: 1.2 K/UL (ref 0.8–3.5)
LYMPHOCYTES NFR BLD: 25 % (ref 12–49)
MCH RBC QN AUTO: 30.1 PG (ref 26–34)
MCHC RBC AUTO-ENTMCNC: 33 G/DL (ref 30–36.5)
MCV RBC AUTO: 91.2 FL (ref 80–99)
MONOCYTES # BLD: 0.7 K/UL (ref 0–1)
MONOCYTES NFR BLD: 14 % (ref 5–13)
NEUTS SEG # BLD: 2.8 K/UL (ref 1.8–8)
NEUTS SEG NFR BLD: 55 % (ref 32–75)
NRBC # BLD: 0 K/UL (ref 0–0.01)
NRBC BLD-RTO: 0 PER 100 WBC
PLATELET # BLD AUTO: 282 K/UL (ref 150–400)
PMV BLD AUTO: 10.2 FL (ref 8.9–12.9)
POTASSIUM SERPL-SCNC: 4.8 MMOL/L (ref 3.5–5.1)
PROT SERPL-MCNC: 7.2 G/DL (ref 6.4–8.2)
RBC # BLD AUTO: 4.09 M/UL (ref 3.8–5.2)
SODIUM SERPL-SCNC: 137 MMOL/L (ref 136–145)
TRIGL SERPL-MCNC: 134 MG/DL
VLDLC SERPL CALC-MCNC: 26.8 MG/DL
WBC # BLD AUTO: 5 K/UL (ref 3.6–11)

## 2024-08-06 DIAGNOSIS — R73.01 IFG (IMPAIRED FASTING GLUCOSE): ICD-10-CM

## 2024-08-06 NOTE — TELEPHONE ENCOUNTER
PCP: Louis Garza MD    Last appt: 6/14/2024   No future appointments.    Requested Prescriptions     Pending Prescriptions Disp Refills    metFORMIN (GLUCOPHAGE) 500 MG tablet [Pharmacy Med Name: METFORMIN 500MG TABLETS] 180 tablet 1     Sig: TAKE 1 TABLET BY MOUTH TWICE DAILY WITH MEALS     Gabriella \"Yutan\" AGUS Ott

## 2024-09-03 RX ORDER — SIMVASTATIN 40 MG
40 TABLET ORAL NIGHTLY
Qty: 90 TABLET | Refills: 1 | Status: SHIPPED | OUTPATIENT
Start: 2024-09-03

## 2024-10-09 RX ORDER — MIRTAZAPINE 7.5 MG/1
7.5 TABLET, FILM COATED ORAL NIGHTLY
Qty: 30 TABLET | Refills: 5 | Status: SHIPPED | OUTPATIENT
Start: 2024-10-09

## 2024-10-20 DIAGNOSIS — F02.80 LATE ONSET ALZHEIMER'S DEMENTIA WITHOUT BEHAVIORAL DISTURBANCE (HCC): ICD-10-CM

## 2024-10-20 DIAGNOSIS — G30.1 LATE ONSET ALZHEIMER'S DEMENTIA WITHOUT BEHAVIORAL DISTURBANCE (HCC): ICD-10-CM

## 2024-10-21 RX ORDER — SERTRALINE HYDROCHLORIDE 25 MG/1
25 TABLET, FILM COATED ORAL NIGHTLY
Qty: 90 TABLET | Refills: 1 | Status: SHIPPED | OUTPATIENT
Start: 2024-10-21

## 2024-11-23 RX ORDER — DICLOFENAC SODIUM 75 MG/1
75 TABLET, DELAYED RELEASE ORAL NIGHTLY
Qty: 90 TABLET | Refills: 1 | Status: SHIPPED | OUTPATIENT
Start: 2024-11-23

## 2024-11-23 RX ORDER — FAMOTIDINE 20 MG/1
20 TABLET, FILM COATED ORAL DAILY
Qty: 90 TABLET | Refills: 1 | Status: SHIPPED | OUTPATIENT
Start: 2024-11-23

## 2024-12-01 DIAGNOSIS — R73.01 IFG (IMPAIRED FASTING GLUCOSE): ICD-10-CM

## 2024-12-02 RX ORDER — LISINOPRIL 20 MG/1
20 TABLET ORAL DAILY
Qty: 90 TABLET | Refills: 2 | Status: SHIPPED | OUTPATIENT
Start: 2024-12-02

## 2024-12-28 DIAGNOSIS — F02.80 LATE ONSET ALZHEIMER'S DEMENTIA WITHOUT BEHAVIORAL DISTURBANCE (HCC): ICD-10-CM

## 2024-12-28 DIAGNOSIS — G30.1 LATE ONSET ALZHEIMER'S DEMENTIA WITHOUT BEHAVIORAL DISTURBANCE (HCC): ICD-10-CM

## 2024-12-30 RX ORDER — DULOXETIN HYDROCHLORIDE 30 MG/1
30 CAPSULE, DELAYED RELEASE ORAL
Qty: 90 CAPSULE | Refills: 1 | Status: SHIPPED | OUTPATIENT
Start: 2024-12-30

## 2025-03-02 RX ORDER — FAMOTIDINE 20 MG/1
20 TABLET, FILM COATED ORAL DAILY
Qty: 90 TABLET | Refills: 0 | Status: SHIPPED | OUTPATIENT
Start: 2025-03-02

## 2025-03-02 RX ORDER — DICLOFENAC SODIUM 75 MG/1
75 TABLET, DELAYED RELEASE ORAL NIGHTLY
Qty: 90 TABLET | Refills: 0 | Status: SHIPPED | OUTPATIENT
Start: 2025-03-02

## 2025-03-11 RX ORDER — MIRTAZAPINE 7.5 MG/1
7.5 TABLET, FILM COATED ORAL NIGHTLY
Qty: 30 TABLET | Refills: 5 | Status: SHIPPED | OUTPATIENT
Start: 2025-03-11

## 2025-03-26 RX ORDER — MEMANTINE HYDROCHLORIDE 10 MG/1
10 TABLET ORAL 2 TIMES DAILY
Qty: 180 TABLET | Refills: 0 | Status: SHIPPED | OUTPATIENT
Start: 2025-03-26

## 2025-05-05 NOTE — PROGRESS NOTES
Subjective:     Follow up/cc: osteoporosis  PCP: Breann Blue DO Gloria RISSA Amin  is a 89 year old female who is here for the evaluation and management of osteoporosis and hyperthyroidism.      ----------------------------------------------------------------------------------------------  INTERIM HISTORY:     Relevant Medication:  Methimazole 5 mg every day  Vitamin D3 1000 International Units daily   MVI  Prolia last dose 12/29/22 Started prolia by outside physician in 2018, every 6 month, hasn't missed doses. Last dose 1/4/24  (Prior Osteoporosis medication history:  Fosamax for about ?10 years until 2017. Did not receive reclast, transitioned to Prolia injection q6months (since 2018)  Dairy 2 servings of yogurt, 2 small glasses of milk, cheese daily, ice cream daily    Patient accompanied by daughter Hank and permission to examine and discuss medical information granted.  Lives at [facility] : [facility] -  Labs were drawn last week - will have staff obtain record      She has been adhering to a daily regimen of methimazole 5 mg. She reports no symptoms of tachycardia, anxiety, or weight loss. However, she has experienced two recent episodes of vertigo, which were transient and occurred during periods of extreme fatigue. She also reports a sensation of goiter. Her mother had a goiter and had it removed. Her brother's daughter had cancer in her thyroid and had it removed.    Thyroid function was noted to be off in 03/2025. A blood draw was performed on 05/02/2025, with a normal Free T4 result, and the TSH result is pending. The last thyroid ultrasound was performed in 2022, showing an enlarged thyroid with benign nodules.    She received Prolia in 01/2025 and is not due for Prolia today. She uses a walker to help with balance.    She was hospitalized for 4 days in 02/2025 due to a severe gallstone and is scheduled to have the stent removed on 05/20/2025. She underwent an ERCP procedure.    FAMILY HISTORY  Her  Nicci Asif is a 82 y.o. female who presents today for Hypertension; Cholesterol Problem; and Depression  . She has a history of   Patient Active Problem List   Diagnosis Code    [facility] F33.9    Essential hypertension I10    Hyperlipidemia E78.5    MCI (mild cognitive impairment) G31.84    Arthritis M19.90    Age-related nuclear cataract of both eyes H25.13   . Today patient is here for follow-up. Graham Carlson MCI: Patient had been previously on Aricept and would stop it as she did not feel like this was helping. After stopping the medication she did not notice that it was making quite a difference. We resume this at last visit. Since she notes that her mentation has been stable. Her and her daughter do note that her memory gets worse at times of stress. Hypertension -patient previously very sensitive to medications. We asked her last time to monitor her home blood pressure readings. Since she notes that this is been well controlled. Hypertension ROS: taking medications as instructed, no medication side effects noted, no TIA's, no chest pain on exertion, no dyspnea on exertion, no swelling of ankles     reports that she has never smoked. She has never used smokeless tobacco.    reports that she drinks about 4.2 - 8.4 oz of alcohol per week. BP Readings from Last 2 Encounters:   06/04/19 126/62   03/04/19 160/80     Right knee pain/arthritis: Patient continues to take diclofenac twice daily. We discussed trying to cut this back and seeing if she truly needs it twice daily. Will check renal function. Hyperlipidemia  Patient stable on Zocor. ROS: taking medications as instructed, no medication side effects noted  No new myalgias, no joint pains, no weakness  No TIA's, no chest pain on exertion, no dyspnea on exertion, no swelling of ankles.    Lab Results   Component Value Date/Time    Cholesterol, total 159 03/04/2019 12:27 PM    HDL Cholesterol 52 03/04/2019 12:27 PM LDL, calculated 78 03/04/2019 12:27 PM    VLDL, calculated 29 03/04/2019 12:27 PM    Triglyceride 146 03/04/2019 12:27 PM         ROS  Review of Systems   Constitutional: Negative for chills, fever and weight loss. HENT: Negative for congestion and sore throat. Eyes: Negative for blurred vision, double vision and photophobia. Respiratory: Negative for cough and shortness of breath. Cardiovascular: Negative for chest pain, palpitations and leg swelling. Gastrointestinal: Negative for abdominal pain, constipation, diarrhea, heartburn, nausea and vomiting. Genitourinary: Negative for dysuria, frequency and urgency. Musculoskeletal: Positive for joint pain. Negative for myalgias. Skin: Negative for rash. Neurological: Negative. Negative for headaches. Endo/Heme/Allergies: Does not bruise/bleed easily. Psychiatric/Behavioral: Positive for depression (stable) and memory loss. Negative for suicidal ideas. Visit Vitals  /62 (BP 1 Location: Left arm, BP Patient Position: Sitting)   Pulse 65   Temp 98.1 °F (36.7 °C) (Oral)   Resp 16   Ht 5' 3\" (1.6 m)   Wt 153 lb (69.4 kg)   SpO2 98%   BMI 27.10 kg/m²       Physical Exam   Constitutional: She is oriented to person, place, and time. She appears well-developed and well-nourished. No distress. HENT:   Head: Normocephalic and atraumatic. Neck: Normal range of motion. Neck supple. No thyromegaly present. Cardiovascular: Normal rate and regular rhythm. No murmur heard. Pulmonary/Chest: Effort normal and breath sounds normal. She has no wheezes. Abdominal: Soft. Bowel sounds are normal. She exhibits no distension. Musculoskeletal: She exhibits no edema. Neurological: She is alert and oriented to person, place, and time. No cranial nerve deficit. Patient alert and oriented to president person and situation not to year or date   Skin: Skin is warm and dry. Psychiatric: She has a normal mood and affect.  Her behavior is normal. mother had a goiter and had it removed. Her brother's daughter had cancer in her thyroid and had it removed.          LAB:  Component      Latest Ref Rng 7/2/2024  5:45 AM   Creatinine      0.50 - 1.10 mg/dL 0.73    GFR,ESTIMATE      >=60 mL/min/1.73m2 76    VITAMIND, 25 HYDROXY      20 - 50 ng/mL 45.9    Albumin      3.8 - 5.0 g/dL 4.0    CALCIUM      8.6 - 10.2 mg/dL 9.5    TSH      0.270 - 4.200 uIU/mL 1.000            Calcium (mg/dL)   Date Value   03/05/2025 9.3   02/25/2025 8.2 (L)   02/24/2025 8.2 (L)     Lab Results   Component Value Date    ALBUMIN 3.6 03/05/2025    ALBUMIN 2.6 (L) 02/25/2025    ALBUMIN 2.8 (L) 02/24/2025     No results found for: \"PTH\"  Magnesium (mg/dL)   Date Value   02/23/2025 2.0   01/06/2023 2.0   02/20/2022 1.7     No results found for: \"PHOS\"  Lab Results   Component Value Date    CREATININE 0.70 03/05/2025    CREATININE 0.61 02/25/2025    CREATININE 0.72 02/24/2025       Lab Results   Component Value Date    GFRA 82 01/22/2020    GFRA 88 08/13/2018     Lab Results   Component Value Date    GFRNA 69 01/08/2025    GFRNA 76 07/02/2024     Alkaline Phosphatase (Units/L)   Date Value   03/05/2025 119 (H)   02/25/2025 250 (H)   02/24/2025 265 (H)     No results found for: \"INTAC\"  VITAMIND, 25 HYDROXY (ng/mL)   Date Value   01/08/2025 59.1   07/02/2024 45.9   12/28/2023 49.4     No components found for: \"OQYNHOA95XES\"        PAST HISTORIES:  I have reviewed the past medical history, family history, social history, medications and allergies listed in the medical record as obtained by my nursing staff and support staff and agree with their documentation.         Objective:     Visit Vitals  /58   Pulse 65   Wt 56.7 kg (125 lb)   SpO2 96%   BMI 24.41 kg/m²         CONST: vital signs reviewed, conversant, no acute distress, well-groomed  EYES: anicteric sclera, no lid lag/proptosis, EOMI  ENMT: hearing grossly intact,, lips symmetric, pink in color  Neck: No visible neck mass,  Current Outpatient Medications   Medication Sig    lisinopril (PRINIVIL, ZESTRIL) 20 mg tablet TAKE 1 TABLET DAILY    DULoxetine (CYMBALTA) 60 mg capsule TAKE 1 CAPSULE DAILY    donepezil (ARICEPT) 10 mg tablet TAKE 1 TABLET BY MOUTH EVERY NIGHT    simvastatin (ZOCOR) 40 mg tablet TAKE 1 TABLET NIGHTLY    diclofenac EC (VOLTAREN) 75 mg EC tablet TAKE 1 TABLET TWICE A DAY    melatonin 5 mg cap capsule Take 10 mg by mouth nightly.  Biotin 2,500 mcg cap Take  by mouth.  cyanocobalamin 1,000 mcg tablet Take 1,000 mcg by mouth daily.  multivitamin (ONE A DAY) tablet Take 1 Tab by mouth daily. No current facility-administered medications for this visit. Past Medical History:   Diagnosis Date    Anxiety     Depression     Hypercholesterolemia     Hypertension       Past Surgical History:   Procedure Laterality Date    HX APPENDECTOMY  1945    HX HYSTERECTOMY  1978    HX KNEE REPLACEMENT Left 2015    HX TONSILLECTOMY  1944      Social History     Tobacco Use    Smoking status: Never Smoker    Smokeless tobacco: Never Used   Substance Use Topics    Alcohol use: Yes     Alcohol/week: 4.2 - 8.4 oz     Types: 7 - 14 Glasses of wine per week      Family History   Adopted: Yes        No Known Allergies     Assessment/Plan  Diagnoses and all orders for this visit:    1. Right knee pain, unspecified chronicity -chronic issue and stable with diclofenac. Patient to cut back to once daily and see if she truly needs 2 tablets daily. -     diclofenac EC (VOLTAREN) 75 mg EC tablet; TAKE 1 TABLET TWICE A DAY    2. Hyperlipidemia, unspecified hyperlipidemia type -refill simvastatin. -     METABOLIC PANEL, BASIC  -     simvastatin (ZOCOR) 40 mg tablet; TAKE 1 TABLET NIGHTLY    3. Arthritis -use NSAID as needed  -     METABOLIC PANEL, BASIC  -     diclofenac EC (VOLTAREN) 75 mg EC tablet; TAKE 1 TABLET TWICE A DAY    4. Essential hypertension -stable with current therapy.     5. MCI (mild symmetric, trachea midline, palpable thyroid nodules and goiter  RESP: normal respiratory effort  SKIN:no visible rash  MSK: no cyanosis of the digits,no spinal tenderness upon palpation   NEURO: CN II- XII grossly intact, no tremor on outstretched hands  PSYCH: AOx3, appropriate insight and judgment, euthymic mood/appropriate affect        Assessment/Plan:     1. Age-related osteoporosis without current pathological fracture    2. Hyperthyroidism    3. Toxic nodular goiter                Osteoporosis    Risk factors include (Bold is present): postmenopausal, limited calcium intake, vitamin D deficiency, smoking history, prior steroid use, prior fractures, immobility    SPEP was wnl  Calcium has been normal  Likely postmenopausal osteoporosis.   Neg celiac.    9/2021 DEXA showed sig osteoporosis in hips    Started prolia by outside physician in 2018, every 6 months , hasn't missed doses.     Most recent Prolia was 1/2025.     There is a concern for increased incidence of vertebral fracture(s) associated with denosumab if therapy is suddenly stopped. Therefore, choosing denosumab will be a life-long choice. This was discussed in detail with pt and daughter. Both are aware to call to reschedule if inadvertently misses a dose.    PLAN:  - pt had chosen to monitor without DXA but continue Prolia q6mo  - prolia due in 7/2025 will f/up  - labs before f/up  - cont calcium from diet 3-4 servings dairy/day  - can cont vit D at 1000 International Units daily  - obtain labs  about 1-2 weeks prior to f/up     Toxic nodular goiter/Hyperthyroidism:  On MMI 5mg every day , was hyperthyroid range 3/2025, pt clinically asymptomatic now    Given age and comorbidities, I do not think surgery is optimal route of care for her. Previously discussed all of above and pt opted to clinically monitor without further imaging studies or invasive test.     Plan;  MMI 5mg daily, staff to  obtain TSH record from last week   TSH before f/up    F/up  cognitive impairment) -better with the Aricept. Continue this medication. No side effects            Colon MD Oscar  6/4/2019    This note was created with the help of speech recognition software Brittany Mayo) and may contain some 'sound alike' errors. in 7/2025     All recent endocrine labs, if available, were reviewed with the patient, and all of pt's questions were answered to pt's apparent satisfaction. Pt and daguther are in agreement w plan.     No orders of the defined types were placed in this encounter.            Thank you for allowing me to participate in this patient's care.     Chuck Bernal MD    Cc: Breann Blue,

## 2025-05-12 ENCOUNTER — TELEPHONE (OUTPATIENT)
Facility: CLINIC | Age: 89
End: 2025-05-12

## 2025-05-12 NOTE — TELEPHONE ENCOUNTER
Sekou called from [facility] to inform the provider that the patient is having back pain. Pain level is a 7. She stated that she don't know why the patient is having back pain.

## 2025-05-12 NOTE — TELEPHONE ENCOUNTER
Outgoing call to pt. Pt daughter picked up. Name and  verified. Pt daughter states that pt called her this morning c/o back pain and that she couldn't get dressed on her own. Nurse at [facility] called daughter later on and stated that they gave pt tylenol but there was no relief. Pt daughter is on her way to see pt now to assess the pt.       Daughter would like pt to see Dr. Garza if he thought it was necessary for pt to come in

## 2025-05-13 NOTE — TELEPHONE ENCOUNTER
Outgoing call to pt daughter. Left message to give us a call back so we could discuss the patients back pain and schedule an appt if she needed

## 2025-05-27 RX ORDER — SIMVASTATIN 40 MG
40 TABLET ORAL NIGHTLY
Qty: 30 TABLET | Refills: 0 | Status: SHIPPED | OUTPATIENT
Start: 2025-05-27

## 2025-06-06 ENCOUNTER — TELEPHONE (OUTPATIENT)
Facility: CLINIC | Age: 89
End: 2025-06-06

## 2025-06-06 RX ORDER — HYDROXYZINE PAMOATE 25 MG/1
25 CAPSULE ORAL 3 TIMES DAILY PRN
Qty: 90 CAPSULE | Refills: 1 | Status: SHIPPED | OUTPATIENT
Start: 2025-06-06

## 2025-06-06 NOTE — TELEPHONE ENCOUNTER
Medication refill for     hydrOXYzine pamoate (VISTARIL) 25 MG capsule    [id number] [address] - P [phone] - F [phone] [[id number]]    Needs immediately, the facility stated the patient has become agitated.

## 2025-06-11 DIAGNOSIS — G30.1 LATE ONSET ALZHEIMER'S DEMENTIA WITHOUT BEHAVIORAL DISTURBANCE (HCC): ICD-10-CM

## 2025-06-11 DIAGNOSIS — F02.80 LATE ONSET ALZHEIMER'S DEMENTIA WITHOUT BEHAVIORAL DISTURBANCE (HCC): ICD-10-CM

## 2025-06-12 ENCOUNTER — TELEPHONE (OUTPATIENT)
Facility: CLINIC | Age: 89
End: 2025-06-12

## 2025-06-12 RX ORDER — DULOXETIN HYDROCHLORIDE 30 MG/1
30 CAPSULE, DELAYED RELEASE ORAL
Qty: 30 CAPSULE | Refills: 0 | Status: SHIPPED | OUTPATIENT
Start: 2025-06-12

## 2025-06-12 NOTE — TELEPHONE ENCOUNTER
Mauro called from [facility] to inform the provider that the patient have a been experiencing  discomfort on her top left breast. Patient has a lump on top left breast with bruising in the area. She been trying to relief the pain with Tylenol   [phone]

## 2025-06-20 ENCOUNTER — OFFICE VISIT (OUTPATIENT)
Facility: CLINIC | Age: 89
End: 2025-06-20
Payer: MEDICARE

## 2025-06-20 VITALS
OXYGEN SATURATION: 96 % | DIASTOLIC BLOOD PRESSURE: 70 MMHG | BODY MASS INDEX: 29.58 KG/M2 | TEMPERATURE: 98.9 F | RESPIRATION RATE: 16 BRPM | SYSTOLIC BLOOD PRESSURE: 138 MMHG | HEART RATE: 88 BPM | WEIGHT: 167 LBS

## 2025-06-20 DIAGNOSIS — C50.912 MALIGNANT NEOPLASM OF LEFT FEMALE BREAST, UNSPECIFIED ESTROGEN RECEPTOR STATUS, UNSPECIFIED SITE OF BREAST (HCC): ICD-10-CM

## 2025-06-20 DIAGNOSIS — N63.20 MASS OF LEFT BREAST, UNSPECIFIED QUADRANT: Primary | ICD-10-CM

## 2025-06-20 DIAGNOSIS — E78.2 MIXED HYPERLIPIDEMIA: ICD-10-CM

## 2025-06-20 DIAGNOSIS — F02.80 LATE ONSET ALZHEIMER'S DEMENTIA WITHOUT BEHAVIORAL DISTURBANCE (HCC): ICD-10-CM

## 2025-06-20 DIAGNOSIS — D05.12 DUCTAL CARCINOMA IN SITU (DCIS) OF LEFT BREAST: ICD-10-CM

## 2025-06-20 DIAGNOSIS — I10 ESSENTIAL HYPERTENSION: ICD-10-CM

## 2025-06-20 DIAGNOSIS — G30.1 LATE ONSET ALZHEIMER'S DEMENTIA WITHOUT BEHAVIORAL DISTURBANCE (HCC): ICD-10-CM

## 2025-06-20 DIAGNOSIS — E11.69 TYPE 2 DIABETES MELLITUS WITH OTHER SPECIFIED COMPLICATION, WITHOUT LONG-TERM CURRENT USE OF INSULIN (HCC): ICD-10-CM

## 2025-06-20 PROCEDURE — 99214 OFFICE O/P EST MOD 30 MIN: CPT | Performed by: INTERNAL MEDICINE

## 2025-06-20 RX ORDER — DULOXETIN HYDROCHLORIDE 30 MG/1
30 CAPSULE, DELAYED RELEASE ORAL
Qty: 90 CAPSULE | Refills: 2 | Status: SHIPPED | OUTPATIENT
Start: 2025-06-20

## 2025-06-20 RX ORDER — MIRTAZAPINE 7.5 MG/1
7.5 TABLET, FILM COATED ORAL NIGHTLY
Qty: 90 TABLET | Refills: 2 | Status: SHIPPED | OUTPATIENT
Start: 2025-06-20

## 2025-06-20 RX ORDER — SIMVASTATIN 40 MG
40 TABLET ORAL NIGHTLY
Qty: 90 TABLET | Refills: 1 | Status: SHIPPED | OUTPATIENT
Start: 2025-06-20

## 2025-06-20 ASSESSMENT — PATIENT HEALTH QUESTIONNAIRE - PHQ9
SUM OF ALL RESPONSES TO PHQ QUESTIONS 1-9: 0
SUM OF ALL RESPONSES TO PHQ QUESTIONS 1-9: 0
2. FEELING DOWN, DEPRESSED OR HOPELESS: NOT AT ALL
SUM OF ALL RESPONSES TO PHQ QUESTIONS 1-9: 0
SUM OF ALL RESPONSES TO PHQ QUESTIONS 1-9: 0
1. LITTLE INTEREST OR PLEASURE IN DOING THINGS: NOT AT ALL

## 2025-06-20 ASSESSMENT — ENCOUNTER SYMPTOMS
CONSTIPATION: 0
CHEST TIGHTNESS: 0
DIARRHEA: 0
BACK PAIN: 0
SHORTNESS OF BREATH: 0
ABDOMINAL PAIN: 0

## 2025-06-21 ENCOUNTER — RESULTS FOLLOW-UP (OUTPATIENT)
Facility: CLINIC | Age: 89
End: 2025-06-21

## 2025-06-21 LAB
ALBUMIN SERPL-MCNC: 4.3 G/DL (ref 3.5–5)
ALBUMIN/GLOB SERPL: 1.3 (ref 1.1–2.2)
ALP SERPL-CCNC: 85 U/L (ref 45–117)
ALT SERPL-CCNC: 36 U/L (ref 12–78)
ANION GAP SERPL CALC-SCNC: 5 MMOL/L (ref 2–12)
AST SERPL-CCNC: 17 U/L (ref 15–37)
BASOPHILS # BLD: 0.04 K/UL (ref 0–0.1)
BASOPHILS NFR BLD: 0.6 % (ref 0–1)
BILIRUB SERPL-MCNC: 0.2 MG/DL (ref 0.2–1)
BUN SERPL-MCNC: 19 MG/DL (ref 6–20)
BUN/CREAT SERPL: 19 (ref 12–20)
CALCIUM SERPL-MCNC: 10.1 MG/DL (ref 8.5–10.1)
CHLORIDE SERPL-SCNC: 107 MMOL/L (ref 97–108)
CHOLEST SERPL-MCNC: 153 MG/DL
CO2 SERPL-SCNC: 26 MMOL/L (ref 21–32)
CREAT SERPL-MCNC: 0.98 MG/DL (ref 0.55–1.02)
DIFFERENTIAL METHOD BLD: NORMAL
EOSINOPHIL # BLD: 0.13 K/UL (ref 0–0.4)
EOSINOPHIL NFR BLD: 2 % (ref 0–7)
ERYTHROCYTE [DISTWIDTH] IN BLOOD BY AUTOMATED COUNT: 13.6 % (ref 11.5–14.5)
EST. AVERAGE GLUCOSE BLD GHB EST-MCNC: 154 MG/DL
GLOBULIN SER CALC-MCNC: 3.2 G/DL (ref 2–4)
GLUCOSE SERPL-MCNC: 161 MG/DL (ref 65–100)
HBA1C MFR BLD: 7 % (ref 4–5.6)
HCT VFR BLD AUTO: 40 % (ref 35–47)
HDLC SERPL-MCNC: 46 MG/DL
HDLC SERPL: 3.3 (ref 0–5)
HGB BLD-MCNC: 13.1 G/DL (ref 11.5–16)
IMM GRANULOCYTES # BLD AUTO: 0.02 K/UL (ref 0–0.04)
IMM GRANULOCYTES NFR BLD AUTO: 0.3 % (ref 0–0.5)
LDLC SERPL CALC-MCNC: 72.8 MG/DL (ref 0–100)
LYMPHOCYTES # BLD: 1.19 K/UL (ref 0.8–3.5)
LYMPHOCYTES NFR BLD: 18 % (ref 12–49)
MCH RBC QN AUTO: 29.4 PG (ref 26–34)
MCHC RBC AUTO-ENTMCNC: 32.8 G/DL (ref 30–36.5)
MCV RBC AUTO: 89.7 FL (ref 80–99)
MONOCYTES # BLD: 0.74 K/UL (ref 0–1)
MONOCYTES NFR BLD: 11.2 % (ref 5–13)
NEUTS SEG # BLD: 4.5 K/UL (ref 1.8–8)
NEUTS SEG NFR BLD: 67.9 % (ref 32–75)
NRBC # BLD: 0 K/UL (ref 0–0.01)
NRBC BLD-RTO: 0 PER 100 WBC
PLATELET # BLD AUTO: 319 K/UL (ref 150–400)
PMV BLD AUTO: 10 FL (ref 8.9–12.9)
POTASSIUM SERPL-SCNC: 4.7 MMOL/L (ref 3.5–5.1)
PROT SERPL-MCNC: 7.5 G/DL (ref 6.4–8.2)
RBC # BLD AUTO: 4.46 M/UL (ref 3.8–5.2)
SODIUM SERPL-SCNC: 138 MMOL/L (ref 136–145)
TRIGL SERPL-MCNC: 171 MG/DL
VLDLC SERPL CALC-MCNC: 34.2 MG/DL
WBC # BLD AUTO: 6.6 K/UL (ref 3.6–11)

## 2025-06-23 ENCOUNTER — TELEPHONE (OUTPATIENT)
Facility: CLINIC | Age: 89
End: 2025-06-23

## 2025-06-23 NOTE — TELEPHONE ENCOUNTER
06/23/2025--This nurse called over to [facility] and spoke with Saima in regards to making patient a NP appointment for breast mass. She stated that patient is already scheduled for July 3rd.

## 2025-06-26 ENCOUNTER — TELEPHONE (OUTPATIENT)
Facility: CLINIC | Age: 89
End: 2025-06-26

## 2025-06-26 DIAGNOSIS — R05.9 COUGH, UNSPECIFIED TYPE: Primary | ICD-10-CM

## 2025-06-26 NOTE — TELEPHONE ENCOUNTER
06/26/2025--Received a fax from [facility] stating that they were requesting an order for SLP to eval and treat as needed (Coughing when eating certain textures of food). Order has been placed and will fax over to the number on the form which is [phone].

## 2025-06-27 DIAGNOSIS — G30.1 LATE ONSET ALZHEIMER'S DEMENTIA WITHOUT BEHAVIORAL DISTURBANCE (HCC): Primary | ICD-10-CM

## 2025-06-27 DIAGNOSIS — F02.80 LATE ONSET ALZHEIMER'S DEMENTIA WITHOUT BEHAVIORAL DISTURBANCE (HCC): Primary | ICD-10-CM

## 2025-06-27 NOTE — TELEPHONE ENCOUNTER
Last visit 6/20/25  Follow up not scheduled     Lab Results   Component Value Date     06/20/2025    K 4.7 06/20/2025     06/20/2025    CO2 26 06/20/2025    BUN 19 06/20/2025    CREATININE 0.98 06/20/2025    GLUCOSE 161 (H) 06/20/2025    CALCIUM 10.1 06/20/2025    BILITOT 0.2 06/20/2025    ALKPHOS 85 06/20/2025    AST 17 06/20/2025    ALT 36 06/20/2025    LABGLOM 56 (L) 06/20/2025    GFRAA >60 04/15/2022    AGRATIO 1.3 03/02/2023    GLOB 3.2 06/20/2025     Lab Results   Component Value Date    WBC 6.6 06/20/2025    HGB 13.1 06/20/2025    HCT 40.0 06/20/2025    MCV 89.7 06/20/2025     06/20/2025

## 2025-06-28 RX ORDER — MEMANTINE HYDROCHLORIDE 10 MG/1
10 TABLET ORAL 2 TIMES DAILY
Qty: 180 TABLET | Refills: 0 | Status: SHIPPED | OUTPATIENT
Start: 2025-06-28

## 2025-07-03 ENCOUNTER — OFFICE VISIT (OUTPATIENT)
Age: 89
End: 2025-07-03

## 2025-07-03 ENCOUNTER — HOSPITAL ENCOUNTER (OUTPATIENT)
Facility: HOSPITAL | Age: 89
Setting detail: SPECIMEN
Discharge: HOME | End: 2025-07-06

## 2025-07-03 ENCOUNTER — CLINICAL DOCUMENTATION (OUTPATIENT)
Age: 89
End: 2025-07-03

## 2025-07-03 VITALS — BODY MASS INDEX: 29.59 KG/M2 | HEIGHT: 63 IN | WEIGHT: 167 LBS

## 2025-07-03 DIAGNOSIS — R92.8 ABNORMAL ULTRASOUND OF BREAST: Primary | ICD-10-CM

## 2025-07-03 DIAGNOSIS — Z85.3 HX OF BREAST CANCER: ICD-10-CM

## 2025-07-03 DIAGNOSIS — N63.21 MASS OF UPPER OUTER QUADRANT OF LEFT BREAST: ICD-10-CM

## 2025-07-03 NOTE — PROGRESS NOTES
[facility]   OFFICE PROCEDURE PROGRESS NOTE        Chart reviewed for the following:   IDr. Yesenia , have reviewed the History, Physical and updated the Allergic reactions for María Atkinson     TIME OUT performed immediately prior to start of procedure:   Dr. NIMA, Yesenia Sharp, have performed the following reviews on María Atkinson prior to the start of the procedure:            * Patient was identified by name and date of birth   * Agreement on procedure being performed was verified  * Risks and Benefits explained to the patient  * Procedure site verified and marked as necessary  * Patient was positioned for comfort  * Consent was signed and verified     Time: 10:00am      Date of procedure: 7/3/2025    Procedure performed by:  Yesenia Sharp MD    Provider assisted by: Gabriella NGUYỄN    Patient assisted by: nursing attendant    How tolerated by patient: tolerated the procedure well with no complications    Post Procedural Pain Scale: 0    Comments: Patient tolerated the procedure well without complications.  Denies pain post biopsy.

## 2025-07-03 NOTE — PROGRESS NOTES
HISTORY OF PRESENT ILLNESS  María Atkinson is a 89 y.o. female     HPI ESTABLISHED Patient here for LEFT breast lump, getting larger, tender to touch.  The nipple is inverted (reportedly inverted 2021).  Here with her daughter Saima    Last seen 1/2021 LEFT Breast pain  US:  Findings: Hyperechoic area with central fluid, corresponding to tender area   Impression: Probable fat necrosis      06/18/20: LEFT breast bx, 12:00-1:00. PATH: Microscopic foci of DCIS. ER was performed and is negative, 0%; however, this is based upon a very few cells and should repeated if additional material  is available. Clinical stage 0.       07/14/20: LEFT breast lumpectomy. 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 Pathologic stage: pT1mi, pNX.      Breast imaging-   Mammogram Result (most recent):  [facility] DIGITAL SCREEN W OR WO CAD BILATERAL 04/04/2023    Narrative  This is a summary report. The complete report is available in the patient's medical record. If you cannot access the medical record, please contact the sending organization for a detailed fax or copy.    STUDY: Bilateral digital screening mammogram    INDICATION:  Screening.    COMPARISON: 2020    BREAST COMPOSITION:  The breasts are heterogeneously dense, which may obscure  small masses.    FINDINGS: Bilateral digital screening mammography was performed and is  interpreted in conjunction with a computer assisted detection (CAD) system.  Postsurgical changes in the left breast. Benign-appearing calcifications in both  breasts. No suspicious masses or calcifications are identified. There has been  no significant change.    Impression  BI-RADS 2: Benign. No mammographic evidence of malignancy.    RECOMMENDATIONS:  Continued screening at clinician's discretion.    The patient will be notified of these results.            Review of Systems      Physical Exam  Constitutional:       Comments: Pleasant, confused

## 2025-07-08 ENCOUNTER — TELEPHONE (OUTPATIENT)
Facility: CLINIC | Age: 89
End: 2025-07-08

## 2025-07-08 NOTE — TELEPHONE ENCOUNTER
07/08/2025--This nurse attempted to call Linda back but she did not answer. I left a detailed message with 's recommendation's.

## 2025-07-08 NOTE — TELEPHONE ENCOUNTER
Linda called from [facility] to inform the provider that the patient had a left breast  biopsy and the patient is having bloody drainage no temperature  No pain or discomfort    Phone: [phone]

## 2025-07-25 ENCOUNTER — TELEPHONE (OUTPATIENT)
Facility: CLINIC | Age: 89
End: 2025-07-25

## 2025-07-25 NOTE — TELEPHONE ENCOUNTER
Mauro called from [facility] to inform the provider that the patient stated that she had a fall this morning 07/25/2025. Patient said her back was hurting. Staff check her out and it was No bleeding or bruising. Staff did not witness the fall. The patient was given Tylenol for back pain.   Phone: [phone]

## 2025-08-12 ENCOUNTER — TELEPHONE (OUTPATIENT)
Age: 89
End: 2025-08-12

## 2025-08-12 ENCOUNTER — OFFICE VISIT (OUTPATIENT)
Age: 89
End: 2025-08-12

## 2025-08-12 ENCOUNTER — HOSPITAL ENCOUNTER (OUTPATIENT)
Facility: HOSPITAL | Age: 89
Setting detail: SPECIMEN
Discharge: HOME | End: 2025-08-15

## 2025-08-12 DIAGNOSIS — N63.21 MASS OF UPPER OUTER QUADRANT OF LEFT BREAST: ICD-10-CM

## 2025-08-12 DIAGNOSIS — Z85.3 HISTORY OF LEFT BREAST CANCER: Primary | ICD-10-CM

## 2025-08-12 DIAGNOSIS — Z86.000 HISTORY OF DUCTAL CARCINOMA IN SITU (DCIS) OF BREAST: Primary | ICD-10-CM

## 2025-08-12 PROBLEM — Z17.1 MALIGNANT NEOPLASM OF LEFT BREAST IN FEMALE, ESTROGEN RECEPTOR NEGATIVE, UNSPECIFIED SITE OF BREAST (HCC): Status: RESOLVED | Noted: 2023-03-01 | Resolved: 2025-08-12

## 2025-08-12 PROBLEM — C50.912 MALIGNANT NEOPLASM OF LEFT BREAST IN FEMALE, ESTROGEN RECEPTOR NEGATIVE, UNSPECIFIED SITE OF BREAST (HCC): Status: RESOLVED | Noted: 2023-03-01 | Resolved: 2025-08-12

## 2025-08-12 PROBLEM — C50.912 BREAST CANCER, LEFT (HCC): Status: ACTIVE | Noted: 2020-06-18

## 2025-08-13 PROBLEM — Z85.3 HISTORY OF LEFT BREAST CANCER: Status: ACTIVE | Noted: 2025-08-13

## 2025-08-13 RX ORDER — HYDROXYZINE PAMOATE 25 MG/1
CAPSULE ORAL
Qty: 90 CAPSULE | Refills: 1 | Status: SHIPPED | OUTPATIENT
Start: 2025-08-13

## 2025-08-14 ENCOUNTER — CLINICAL DOCUMENTATION (OUTPATIENT)
Age: 89
End: 2025-08-14

## 2025-08-14 ENCOUNTER — HOSPITAL ENCOUNTER (OUTPATIENT)
Facility: HOSPITAL | Age: 89
Discharge: HOME | End: 2025-08-17
Payer: MEDICARE

## 2025-08-14 VITALS
SYSTOLIC BLOOD PRESSURE: 159 MMHG | WEIGHT: 165.3 LBS | HEIGHT: 63 IN | OXYGEN SATURATION: 97 % | BODY MASS INDEX: 29.29 KG/M2 | HEART RATE: 84 BPM | DIASTOLIC BLOOD PRESSURE: 80 MMHG | RESPIRATION RATE: 16 BRPM

## 2025-08-14 LAB
ANION GAP SERPL CALC-SCNC: 15 MMOL/L (ref 2–14)
BUN SERPL-MCNC: 17 MG/DL (ref 8–23)
BUN/CREAT SERPL: 20 (ref 12–20)
CALCIUM SERPL-MCNC: 9.3 MG/DL (ref 8.8–10.2)
CHLORIDE SERPL-SCNC: 102 MMOL/L (ref 98–107)
CO2 SERPL-SCNC: 19 MMOL/L (ref 20–29)
CREAT SERPL-MCNC: 0.81 MG/DL (ref 0.6–1)
GLUCOSE SERPL-MCNC: 165 MG/DL (ref 65–100)
POTASSIUM SERPL-SCNC: 4.8 MMOL/L (ref 3.5–5.1)
SODIUM SERPL-SCNC: 137 MMOL/L (ref 136–145)

## 2025-08-14 PROCEDURE — 36415 COLL VENOUS BLD VENIPUNCTURE: CPT

## 2025-08-14 PROCEDURE — 80048 BASIC METABOLIC PNL TOTAL CA: CPT

## 2025-08-14 PROCEDURE — 93005 ELECTROCARDIOGRAM TRACING: CPT | Performed by: NURSE PRACTITIONER

## 2025-08-14 RX ORDER — DIAPER,BRIEF,INFANT-TODD,DISP
EACH MISCELLANEOUS AS NEEDED
COMMUNITY

## 2025-08-14 RX ORDER — BENZONATATE 100 MG/1
100 CAPSULE ORAL AS NEEDED
COMMUNITY

## 2025-08-14 RX ORDER — GUAIFENESIN 600 MG/1
1200 TABLET, EXTENDED RELEASE ORAL AS NEEDED
COMMUNITY

## 2025-08-14 RX ORDER — ACETAMINOPHEN 325 MG/1
325 TABLET ORAL AS NEEDED
COMMUNITY

## 2025-08-14 RX ORDER — MULTIVITAMIN WITH IRON
1 TABLET ORAL DAILY
COMMUNITY

## 2025-08-14 RX ORDER — MIRTAZAPINE 15 MG/1
15 TABLET, FILM COATED ORAL NIGHTLY
COMMUNITY

## 2025-08-15 LAB
EKG ATRIAL RATE: 72 BPM
EKG DIAGNOSIS: NORMAL
EKG P AXIS: -22 DEGREES
EKG P-R INTERVAL: 156 MS
EKG Q-T INTERVAL: 382 MS
EKG QRS DURATION: 96 MS
EKG QTC CALCULATION (BAZETT): 418 MS
EKG R AXIS: 5 DEGREES
EKG T AXIS: 58 DEGREES
EKG VENTRICULAR RATE: 72 BPM

## 2025-08-15 PROCEDURE — 93010 ELECTROCARDIOGRAM REPORT: CPT | Performed by: SPECIALIST

## 2025-08-17 ENCOUNTER — ANESTHESIA EVENT (OUTPATIENT)
Facility: HOSPITAL | Age: 89
End: 2025-08-17
Payer: MEDICARE

## 2025-08-18 ENCOUNTER — TELEPHONE (OUTPATIENT)
Facility: CLINIC | Age: 89
End: 2025-08-18

## 2025-08-18 ENCOUNTER — ANESTHESIA (OUTPATIENT)
Facility: HOSPITAL | Age: 89
End: 2025-08-18
Payer: MEDICARE

## 2025-08-18 ENCOUNTER — HOSPITAL ENCOUNTER (OUTPATIENT)
Facility: HOSPITAL | Age: 89
Setting detail: OUTPATIENT SURGERY
Discharge: HOME | End: 2025-08-18
Attending: SURGERY | Admitting: SURGERY
Payer: MEDICARE

## 2025-08-18 VITALS
TEMPERATURE: 98 F | DIASTOLIC BLOOD PRESSURE: 59 MMHG | BODY MASS INDEX: 28.24 KG/M2 | SYSTOLIC BLOOD PRESSURE: 187 MMHG | OXYGEN SATURATION: 94 % | WEIGHT: 159.4 LBS | RESPIRATION RATE: 11 BRPM | HEART RATE: 69 BPM

## 2025-08-18 DIAGNOSIS — Z85.3 HISTORY OF LEFT BREAST CANCER: ICD-10-CM

## 2025-08-18 DIAGNOSIS — G89.18 POST-OP PAIN: Primary | ICD-10-CM

## 2025-08-18 LAB
GLUCOSE BLD STRIP.AUTO-MCNC: 143 MG/DL (ref 65–117)
SERVICE CMNT-IMP: ABNORMAL

## 2025-08-18 PROCEDURE — 6360000002 HC RX W HCPCS: Performed by: ANESTHESIOLOGY

## 2025-08-18 PROCEDURE — 82962 GLUCOSE BLOOD TEST: CPT

## 2025-08-18 PROCEDURE — 7100000010 HC PHASE II RECOVERY - FIRST 15 MIN: Performed by: SURGERY

## 2025-08-18 PROCEDURE — 2709999900 HC NON-CHARGEABLE SUPPLY: Performed by: SURGERY

## 2025-08-18 PROCEDURE — 3600000004 HC SURGERY LEVEL 4 BASE: Performed by: SURGERY

## 2025-08-18 PROCEDURE — 19301 PARTIAL MASTECTOMY: CPT | Performed by: SURGERY

## 2025-08-18 PROCEDURE — 88341 IMHCHEM/IMCYTCHM EA ADD ANTB: CPT

## 2025-08-18 PROCEDURE — 7100000001 HC PACU RECOVERY - ADDTL 15 MIN: Performed by: SURGERY

## 2025-08-18 PROCEDURE — 88307 TISSUE EXAM BY PATHOLOGIST: CPT

## 2025-08-18 PROCEDURE — 3600000014 HC SURGERY LEVEL 4 ADDTL 15MIN: Performed by: SURGERY

## 2025-08-18 PROCEDURE — 3700000001 HC ADD 15 MINUTES (ANESTHESIA): Performed by: SURGERY

## 2025-08-18 PROCEDURE — 2720000010 HC SURG SUPPLY STERILE: Performed by: SURGERY

## 2025-08-18 PROCEDURE — 2500000003 HC RX 250 WO HCPCS: Performed by: SURGERY

## 2025-08-18 PROCEDURE — 88360 TUMOR IMMUNOHISTOCHEM/MANUAL: CPT

## 2025-08-18 PROCEDURE — 7100000000 HC PACU RECOVERY - FIRST 15 MIN: Performed by: SURGERY

## 2025-08-18 PROCEDURE — 3700000000 HC ANESTHESIA ATTENDED CARE: Performed by: SURGERY

## 2025-08-18 PROCEDURE — 88377 M/PHMTRC ALYS ISHQUANT/SEMIQ: CPT

## 2025-08-18 PROCEDURE — 7100000011 HC PHASE II RECOVERY - ADDTL 15 MIN: Performed by: SURGERY

## 2025-08-18 PROCEDURE — 88342 IMHCHEM/IMCYTCHM 1ST ANTB: CPT

## 2025-08-18 RX ORDER — SODIUM CHLORIDE, SODIUM LACTATE, POTASSIUM CHLORIDE, CALCIUM CHLORIDE 600; 310; 30; 20 MG/100ML; MG/100ML; MG/100ML; MG/100ML
INJECTION, SOLUTION INTRAVENOUS CONTINUOUS
Status: DISCONTINUED | OUTPATIENT
Start: 2025-08-18 | End: 2025-08-18 | Stop reason: HOSPADM

## 2025-08-18 RX ORDER — ONDANSETRON 2 MG/ML
4 INJECTION INTRAMUSCULAR; INTRAVENOUS
Status: DISCONTINUED | OUTPATIENT
Start: 2025-08-18 | End: 2025-08-18 | Stop reason: HOSPADM

## 2025-08-18 RX ORDER — KETOROLAC TROMETHAMINE 15 MG/ML
15 INJECTION, SOLUTION INTRAMUSCULAR; INTRAVENOUS ONCE
Status: DISCONTINUED | OUTPATIENT
Start: 2025-08-18 | End: 2025-08-18

## 2025-08-18 RX ORDER — PROPOFOL 10 MG/ML
INJECTION, EMULSION INTRAVENOUS
Status: DISCONTINUED | OUTPATIENT
Start: 2025-08-18 | End: 2025-08-18 | Stop reason: SDUPTHER

## 2025-08-18 RX ORDER — OXYCODONE HYDROCHLORIDE 5 MG/1
5 TABLET ORAL EVERY 6 HOURS PRN
Qty: 20 TABLET | Refills: 0 | Status: SHIPPED | OUTPATIENT
Start: 2025-08-18 | End: 2025-08-25

## 2025-08-18 RX ORDER — FENTANYL CITRATE 50 UG/ML
100 INJECTION, SOLUTION INTRAMUSCULAR; INTRAVENOUS
Status: DISCONTINUED | OUTPATIENT
Start: 2025-08-18 | End: 2025-08-18 | Stop reason: HOSPADM

## 2025-08-18 RX ORDER — DIPHENHYDRAMINE HYDROCHLORIDE 50 MG/ML
12.5 INJECTION, SOLUTION INTRAMUSCULAR; INTRAVENOUS
Status: DISCONTINUED | OUTPATIENT
Start: 2025-08-18 | End: 2025-08-18 | Stop reason: HOSPADM

## 2025-08-18 RX ORDER — LIDOCAINE HYDROCHLORIDE 10 MG/ML
1 INJECTION, SOLUTION EPIDURAL; INFILTRATION; INTRACAUDAL; PERINEURAL
Status: DISCONTINUED | OUTPATIENT
Start: 2025-08-18 | End: 2025-08-18 | Stop reason: HOSPADM

## 2025-08-18 RX ORDER — FENTANYL CITRATE 50 UG/ML
INJECTION, SOLUTION INTRAMUSCULAR; INTRAVENOUS
Status: DISCONTINUED | OUTPATIENT
Start: 2025-08-18 | End: 2025-08-18 | Stop reason: SDUPTHER

## 2025-08-18 RX ORDER — MIDAZOLAM HYDROCHLORIDE 2 MG/2ML
2 INJECTION, SOLUTION INTRAMUSCULAR; INTRAVENOUS
Status: DISCONTINUED | OUTPATIENT
Start: 2025-08-18 | End: 2025-08-18 | Stop reason: HOSPADM

## 2025-08-18 RX ORDER — BUPIVACAINE HCL/EPINEPHRINE 0.5-1:200K
VIAL (ML) INJECTION PRN
Status: DISCONTINUED | OUTPATIENT
Start: 2025-08-18 | End: 2025-08-18 | Stop reason: ALTCHOICE

## 2025-08-18 RX ADMIN — FENTANYL CITRATE 25 MCG: 50 INJECTION, SOLUTION INTRAMUSCULAR; INTRAVENOUS at 10:19

## 2025-08-18 RX ADMIN — HYDROMORPHONE HYDROCHLORIDE 0.5 MG: 1 INJECTION, SOLUTION INTRAMUSCULAR; INTRAVENOUS; SUBCUTANEOUS at 12:26

## 2025-08-18 RX ADMIN — FENTANYL CITRATE 50 MCG: 50 INJECTION, SOLUTION INTRAMUSCULAR; INTRAVENOUS at 10:53

## 2025-08-18 RX ADMIN — PROPOFOL 100 MG: 10 INJECTION, EMULSION INTRAVENOUS at 10:32

## 2025-08-18 RX ADMIN — PROPOFOL 50 MCG/KG/MIN: 10 INJECTION, EMULSION INTRAVENOUS at 10:10

## 2025-08-18 RX ADMIN — HYDROMORPHONE HYDROCHLORIDE 0.5 MG: 1 INJECTION, SOLUTION INTRAMUSCULAR; INTRAVENOUS; SUBCUTANEOUS at 12:10

## 2025-08-18 RX ADMIN — FENTANYL CITRATE 25 MCG: 50 INJECTION, SOLUTION INTRAMUSCULAR; INTRAVENOUS at 10:06

## 2025-08-18 RX ADMIN — HYDROMORPHONE HYDROCHLORIDE 0.5 MG: 1 INJECTION, SOLUTION INTRAMUSCULAR; INTRAVENOUS; SUBCUTANEOUS at 11:54

## 2025-08-18 ASSESSMENT — PAIN - FUNCTIONAL ASSESSMENT
PAIN_FUNCTIONAL_ASSESSMENT: 0-10

## 2025-08-18 ASSESSMENT — ENCOUNTER SYMPTOMS: SHORTNESS OF BREATH: 1

## 2025-08-18 ASSESSMENT — PAIN DESCRIPTION - DESCRIPTORS
DESCRIPTORS: BURNING

## 2025-08-18 ASSESSMENT — PAIN DESCRIPTION - ONSET
ONSET: GRADUAL
ONSET: ON-GOING

## 2025-08-18 ASSESSMENT — PAIN SCALES - GENERAL
PAINLEVEL_OUTOF10: 3
PAINLEVEL_OUTOF10: 3
PAINLEVEL_OUTOF10: 6
PAINLEVEL_OUTOF10: 6
PAINLEVEL_OUTOF10: 7

## 2025-08-18 ASSESSMENT — PAIN DESCRIPTION - FREQUENCY
FREQUENCY: CONTINUOUS
FREQUENCY: CONTINUOUS

## 2025-08-18 ASSESSMENT — PAIN DESCRIPTION - LOCATION
LOCATION: BREAST

## 2025-08-18 ASSESSMENT — PAIN DESCRIPTION - ORIENTATION
ORIENTATION: LEFT

## 2025-08-18 ASSESSMENT — PAIN DESCRIPTION - PAIN TYPE: TYPE: SURGICAL PAIN

## 2025-08-21 ENCOUNTER — OFFICE VISIT (OUTPATIENT)
Age: 89
End: 2025-08-21

## 2025-08-21 DIAGNOSIS — N64.89 SEROMA OF BREAST: Primary | ICD-10-CM

## 2025-08-21 PROCEDURE — 99024 POSTOP FOLLOW-UP VISIT: CPT | Performed by: SURGERY

## 2025-08-25 ENCOUNTER — TELEPHONE (OUTPATIENT)
Age: 89
End: 2025-08-25

## 2025-08-25 RX ORDER — DICLOFENAC SODIUM 75 MG/1
75 TABLET, DELAYED RELEASE ORAL NIGHTLY
Qty: 90 TABLET | Refills: 0 | Status: SHIPPED | OUTPATIENT
Start: 2025-08-25

## 2025-08-25 RX ORDER — LISINOPRIL 20 MG/1
20 TABLET ORAL DAILY
Qty: 90 TABLET | Refills: 2 | Status: SHIPPED | OUTPATIENT
Start: 2025-08-25

## 2025-08-25 RX ORDER — FAMOTIDINE 20 MG/1
20 TABLET, FILM COATED ORAL DAILY
Qty: 90 TABLET | Refills: 0 | Status: SHIPPED | OUTPATIENT
Start: 2025-08-25

## 2025-08-26 ENCOUNTER — TELEPHONE (OUTPATIENT)
Age: 89
End: 2025-08-26

## 2025-08-26 ENCOUNTER — TELEPHONE (OUTPATIENT)
Facility: HOSPITAL | Age: 89
End: 2025-08-26

## 2025-08-26 DIAGNOSIS — C50.812 MALIGNANT NEOPLASM OF OVERLAPPING SITES OF LEFT FEMALE BREAST, UNSPECIFIED ESTROGEN RECEPTOR STATUS (HCC): Primary | ICD-10-CM

## 2025-08-28 ENCOUNTER — CLINICAL DOCUMENTATION (OUTPATIENT)
Age: 89
End: 2025-08-28

## 2025-08-28 ENCOUNTER — INITIAL CONSULT (OUTPATIENT)
Age: 89
End: 2025-08-28
Payer: MEDICARE

## 2025-08-28 VITALS
TEMPERATURE: 98.8 F | RESPIRATION RATE: 18 BRPM | DIASTOLIC BLOOD PRESSURE: 64 MMHG | HEART RATE: 75 BPM | HEIGHT: 63 IN | WEIGHT: 162 LBS | BODY MASS INDEX: 28.7 KG/M2 | SYSTOLIC BLOOD PRESSURE: 150 MMHG | OXYGEN SATURATION: 98 %

## 2025-08-28 DIAGNOSIS — C50.812 MALIGNANT NEOPLASM OF OVERLAPPING SITES OF LEFT BREAST IN FEMALE, ESTROGEN RECEPTOR NEGATIVE (HCC): Primary | ICD-10-CM

## 2025-08-28 DIAGNOSIS — F02.80 LATE ONSET ALZHEIMER'S DEMENTIA WITHOUT BEHAVIORAL DISTURBANCE (HCC): ICD-10-CM

## 2025-08-28 DIAGNOSIS — R73.01 IFG (IMPAIRED FASTING GLUCOSE): ICD-10-CM

## 2025-08-28 DIAGNOSIS — Z17.1 MALIGNANT NEOPLASM OF OVERLAPPING SITES OF LEFT BREAST IN FEMALE, ESTROGEN RECEPTOR NEGATIVE (HCC): Primary | ICD-10-CM

## 2025-08-28 DIAGNOSIS — G30.1 LATE ONSET ALZHEIMER'S DEMENTIA WITHOUT BEHAVIORAL DISTURBANCE (HCC): ICD-10-CM

## 2025-08-28 PROCEDURE — 1160F RVW MEDS BY RX/DR IN RCRD: CPT | Performed by: INTERNAL MEDICINE

## 2025-08-28 PROCEDURE — 1090F PRES/ABSN URINE INCON ASSESS: CPT | Performed by: INTERNAL MEDICINE

## 2025-08-28 PROCEDURE — 1159F MED LIST DOCD IN RCRD: CPT | Performed by: INTERNAL MEDICINE

## 2025-08-28 PROCEDURE — G8427 DOCREV CUR MEDS BY ELIG CLIN: HCPCS | Performed by: INTERNAL MEDICINE

## 2025-08-28 PROCEDURE — 1123F ACP DISCUSS/DSCN MKR DOCD: CPT | Performed by: INTERNAL MEDICINE

## 2025-08-28 PROCEDURE — 1036F TOBACCO NON-USER: CPT | Performed by: INTERNAL MEDICINE

## 2025-08-28 PROCEDURE — G8419 CALC BMI OUT NRM PARAM NOF/U: HCPCS | Performed by: INTERNAL MEDICINE

## 2025-08-28 PROCEDURE — 99205 OFFICE O/P NEW HI 60 MIN: CPT | Performed by: INTERNAL MEDICINE

## 2025-08-28 PROCEDURE — 1126F AMNT PAIN NOTED NONE PRSNT: CPT | Performed by: INTERNAL MEDICINE

## 2025-08-28 ASSESSMENT — PATIENT HEALTH QUESTIONNAIRE - PHQ9
SUM OF ALL RESPONSES TO PHQ QUESTIONS 1-9: 0
SUM OF ALL RESPONSES TO PHQ QUESTIONS 1-9: 0
1. LITTLE INTEREST OR PLEASURE IN DOING THINGS: NOT AT ALL
SUM OF ALL RESPONSES TO PHQ QUESTIONS 1-9: 0
2. FEELING DOWN, DEPRESSED OR HOPELESS: NOT AT ALL
SUM OF ALL RESPONSES TO PHQ QUESTIONS 1-9: 0

## (undated) DEVICE — SMOKE EVACUATION PENCIL: Brand: VALLEYLAB

## (undated) DEVICE — EVACUATOR SMOKE L 10 FT SMOKE MANAGEMENT EXTENDED EDGE ELECTRODE STERILE DISP

## (undated) DEVICE — SUTURE VICRYL + SZ 3-0 L27IN ABSRB UD L26MM SH 1/2 CIR VCP416H

## (undated) DEVICE — SUTURE PERMA-HAND SZ 2-0 L30IN NONABSORBABLE BLK L26MM SH K833H

## (undated) DEVICE — NEEDLE HYPO 22GA L1.5IN BLK S STL HUB POLYPR SHLD REG BVL

## (undated) DEVICE — DERMABOND SKIN ADH 0.7ML -- DERMABOND ADVANCED 12/BX

## (undated) DEVICE — COVER US PRB W15XL120CM W/ GEL RUBBERBAND TAPE STRP FLD GEN

## (undated) DEVICE — NEEDLE HYPO 22GA L1.5IN ULT SHRP TRIBEVELED INTUITIVE 1 HND

## (undated) DEVICE — SUTURE VCRL SZ 3-0 L27IN ABSRB UD L26MM SH 1/2 CIR J416H

## (undated) DEVICE — STRIP SKIN CLSR W0.5XL4IN WHT SPUNBOUND FBR NYL STERIL HI ADH

## (undated) DEVICE — STERILE POLYISOPRENE POWDER-FREE SURGICAL GLOVES: Brand: PROTEXIS

## (undated) DEVICE — NEEDLE SAFETY MONOJECT 25GX1-1/2

## (undated) DEVICE — DRESSING CELLERATE RX 5 GM SURG PWD HYDROLYZED CLLGN

## (undated) DEVICE — STRAP,POSITIONING,KNEE/BODY,FOAM,4X60": Brand: MEDLINE

## (undated) DEVICE — CANISTER SUCT 3000CC RIG LOK SNAP ON LID W/ STD EL SUCT PRT

## (undated) DEVICE — SYRINGE MED 10ML LUERLOCK TIP W/O SFTY DISP

## (undated) DEVICE — INSULATED BLADE ELECTRODE: Brand: EDGE

## (undated) DEVICE — CHEST PACK: Brand: MEDLINE INDUSTRIES, INC.

## (undated) DEVICE — DEVICE TRNSF SPIK STL 2008S] MICROTEK MEDICAL INC]

## (undated) DEVICE — SUTURE MCRYL SZ 4-0 L27IN ABSRB UD L19MM PS-2 1/2 CIR PRIM Y426H

## (undated) DEVICE — SOL IRRIGATION INJ NACL 0.9% 500ML BTL

## (undated) DEVICE — GLOVE SURG SZ 65 THK91MIL LTX FREE SYN POLYISOPRENE

## (undated) DEVICE — SOLUTION IV 500ML 0.9% SOD BOTTLE CHL LTWT DURABLE SHATTERPROOF

## (undated) DEVICE — SOLUTION IRRIG 1000ML H2O PIC PLAS SHATTERPROOF CONTAINER

## (undated) DEVICE — SUTURE MONOCRYL SZ 3-0 L27IN ABSRB UD PS-2 3/8 CIR REV CUT NDL MCP427H

## (undated) DEVICE — Device

## (undated) DEVICE — COVER LT HNDL PLAS RIG 1 PER PK

## (undated) DEVICE — INFECTION CONTROL KIT SYS